# Patient Record
Sex: FEMALE | Race: WHITE | Employment: UNEMPLOYED | URBAN - METROPOLITAN AREA
[De-identification: names, ages, dates, MRNs, and addresses within clinical notes are randomized per-mention and may not be internally consistent; named-entity substitution may affect disease eponyms.]

---

## 2017-06-08 ENCOUNTER — OFFICE VISIT (OUTPATIENT)
Dept: FAMILY MEDICINE CLINIC | Age: 32
End: 2017-06-08

## 2017-06-08 VITALS
BODY MASS INDEX: 29.77 KG/M2 | HEIGHT: 63 IN | OXYGEN SATURATION: 98 % | SYSTOLIC BLOOD PRESSURE: 122 MMHG | DIASTOLIC BLOOD PRESSURE: 84 MMHG | WEIGHT: 168 LBS | HEART RATE: 74 BPM

## 2017-06-08 DIAGNOSIS — R10.13 EPIGASTRIC PAIN: ICD-10-CM

## 2017-06-08 DIAGNOSIS — R20.2 NUMBNESS AND TINGLING IN LEFT ARM: ICD-10-CM

## 2017-06-08 DIAGNOSIS — R42 DIZZINESS: ICD-10-CM

## 2017-06-08 DIAGNOSIS — R20.0 NUMBNESS AND TINGLING IN LEFT ARM: ICD-10-CM

## 2017-06-08 DIAGNOSIS — R51.9 PERSISTENT HEADACHES: ICD-10-CM

## 2017-06-08 DIAGNOSIS — N88.9 ABNORMALITY OF UTERINE CERVIX: ICD-10-CM

## 2017-06-08 DIAGNOSIS — K46.9 ABDOMINAL HERNIA WITHOUT OBSTRUCTION AND WITHOUT GANGRENE, RECURRENCE NOT SPECIFIED, UNSPECIFIED HERNIA TYPE: ICD-10-CM

## 2017-06-08 DIAGNOSIS — F90.9 ATTENTION DEFICIT HYPERACTIVITY DISORDER (ADHD), UNSPECIFIED ADHD TYPE: Primary | ICD-10-CM

## 2017-06-08 PROCEDURE — 99203 OFFICE O/P NEW LOW 30 MIN: CPT | Performed by: FAMILY MEDICINE

## 2017-06-08 ASSESSMENT — ENCOUNTER SYMPTOMS
DIARRHEA: 1
ABDOMINAL PAIN: 1
VOMITING: 0
BLOOD IN STOOL: 0
CONSTIPATION: 0
BACK PAIN: 1
NAUSEA: 0

## 2017-06-08 ASSESSMENT — PATIENT HEALTH QUESTIONNAIRE - PHQ9
SUM OF ALL RESPONSES TO PHQ9 QUESTIONS 1 & 2: 0
2. FEELING DOWN, DEPRESSED OR HOPELESS: 0
1. LITTLE INTEREST OR PLEASURE IN DOING THINGS: 0
SUM OF ALL RESPONSES TO PHQ QUESTIONS 1-9: 0

## 2017-06-09 ENCOUNTER — HOSPITAL ENCOUNTER (OUTPATIENT)
Dept: GENERAL RADIOLOGY | Age: 32
Discharge: HOME | End: 2017-06-09

## 2017-06-09 DIAGNOSIS — R20.0 NUMBNESS AND TINGLING IN LEFT ARM: ICD-10-CM

## 2017-06-09 DIAGNOSIS — R42 DIZZINESS: ICD-10-CM

## 2017-06-09 DIAGNOSIS — R20.2 NUMBNESS AND TINGLING IN LEFT ARM: ICD-10-CM

## 2017-06-09 DIAGNOSIS — R51.9 PERSISTENT HEADACHES: ICD-10-CM

## 2017-06-09 LAB
A/G RATIO: 1.5 (ref 1.1–2.2)
ALBUMIN SERPL-MCNC: 4.5 G/DL (ref 3.4–5)
ALP BLD-CCNC: 76 U/L (ref 40–129)
ALT SERPL-CCNC: 13 U/L (ref 10–40)
ANION GAP SERPL CALCULATED.3IONS-SCNC: 17 MMOL/L (ref 3–16)
AST SERPL-CCNC: 21 U/L (ref 15–37)
BASOPHILS ABSOLUTE: 0 K/UL (ref 0–0.2)
BASOPHILS RELATIVE PERCENT: 0.4 %
BILIRUB SERPL-MCNC: 1.1 MG/DL (ref 0–1)
BUN BLDV-MCNC: 14 MG/DL (ref 7–20)
CALCIUM SERPL-MCNC: 9.8 MG/DL (ref 8.3–10.6)
CHLORIDE BLD-SCNC: 102 MMOL/L (ref 99–110)
CHOLESTEROL, TOTAL: 190 MG/DL (ref 0–199)
CO2: 22 MMOL/L (ref 21–32)
CREAT SERPL-MCNC: 0.6 MG/DL (ref 0.6–1.1)
EOSINOPHILS ABSOLUTE: 0.1 K/UL (ref 0–0.6)
EOSINOPHILS RELATIVE PERCENT: 1 %
GFR AFRICAN AMERICAN: >60
GFR NON-AFRICAN AMERICAN: >60
GLOBULIN: 3.1 G/DL
GLUCOSE BLD-MCNC: 73 MG/DL (ref 70–99)
HCT VFR BLD CALC: 43.9 % (ref 36–48)
HDLC SERPL-MCNC: 37 MG/DL (ref 40–60)
HEMOGLOBIN: 14.7 G/DL (ref 12–16)
LDL CHOLESTEROL CALCULATED: 125 MG/DL
LYMPHOCYTES ABSOLUTE: 2.1 K/UL (ref 1–5.1)
LYMPHOCYTES RELATIVE PERCENT: 30.1 %
MAGNESIUM: 2.2 MG/DL (ref 1.8–2.4)
MCH RBC QN AUTO: 30 PG (ref 26–34)
MCHC RBC AUTO-ENTMCNC: 33.6 G/DL (ref 31–36)
MCV RBC AUTO: 89.2 FL (ref 80–100)
MONOCYTES ABSOLUTE: 0.4 K/UL (ref 0–1.3)
MONOCYTES RELATIVE PERCENT: 5.8 %
NEUTROPHILS ABSOLUTE: 4.3 K/UL (ref 1.7–7.7)
NEUTROPHILS RELATIVE PERCENT: 62.7 %
PDW BLD-RTO: 12.8 % (ref 12.4–15.4)
PLATELET # BLD: 257 K/UL (ref 135–450)
PMV BLD AUTO: 8.3 FL (ref 5–10.5)
POTASSIUM SERPL-SCNC: 4.4 MMOL/L (ref 3.5–5.1)
RBC # BLD: 4.92 M/UL (ref 4–5.2)
SODIUM BLD-SCNC: 141 MMOL/L (ref 136–145)
T3 FREE: 3.1 PG/ML (ref 2.3–4.2)
TOTAL PROTEIN: 7.6 G/DL (ref 6.4–8.2)
TRIGL SERPL-MCNC: 138 MG/DL (ref 0–150)
TSH SERPL DL<=0.05 MIU/L-ACNC: 1.78 UIU/ML (ref 0.27–4.2)
VLDLC SERPL CALC-MCNC: 28 MG/DL
WBC # BLD: 6.9 K/UL (ref 4–11)

## 2017-06-14 ENCOUNTER — TELEPHONE (OUTPATIENT)
Dept: FAMILY MEDICINE CLINIC | Age: 32
End: 2017-06-14

## 2017-09-13 ENCOUNTER — OFFICE VISIT (OUTPATIENT)
Dept: FAMILY MEDICINE CLINIC | Age: 32
End: 2017-09-13

## 2017-09-13 VITALS
SYSTOLIC BLOOD PRESSURE: 126 MMHG | OXYGEN SATURATION: 96 % | HEART RATE: 74 BPM | HEIGHT: 63 IN | TEMPERATURE: 97.8 F | BODY MASS INDEX: 30.12 KG/M2 | RESPIRATION RATE: 12 BRPM | WEIGHT: 170 LBS | DIASTOLIC BLOOD PRESSURE: 88 MMHG

## 2017-09-13 DIAGNOSIS — N39.0 URINARY TRACT INFECTION WITH HEMATURIA, SITE UNSPECIFIED: Primary | ICD-10-CM

## 2017-09-13 DIAGNOSIS — R31.9 URINARY TRACT INFECTION WITH HEMATURIA, SITE UNSPECIFIED: Primary | ICD-10-CM

## 2017-09-13 LAB
BILIRUBIN, POC: NORMAL
BLOOD URINE, POC: NORMAL
CLARITY, POC: NORMAL
COLOR, POC: NORMAL
GLUCOSE URINE, POC: NORMAL
KETONES, POC: NORMAL
LEUKOCYTE EST, POC: NORMAL
NITRITE, POC: NORMAL
PH, POC: 6.5
PROTEIN, POC: NORMAL
SPECIFIC GRAVITY, POC: NORMAL
UROBILINOGEN, POC: 2

## 2017-09-13 PROCEDURE — 81002 URINALYSIS NONAUTO W/O SCOPE: CPT | Performed by: NURSE PRACTITIONER

## 2017-09-13 PROCEDURE — 99213 OFFICE O/P EST LOW 20 MIN: CPT | Performed by: NURSE PRACTITIONER

## 2017-09-13 RX ORDER — CIPROFLOXACIN 500 MG/1
500 TABLET, FILM COATED ORAL 2 TIMES DAILY
Qty: 20 TABLET | Refills: 0 | Status: CANCELLED | OUTPATIENT
Start: 2017-09-13 | End: 2017-09-23

## 2017-09-13 RX ORDER — PHENAZOPYRIDINE HYDROCHLORIDE 100 MG/1
100 TABLET, FILM COATED ORAL 3 TIMES DAILY PRN
Qty: 9 TABLET | Refills: 0 | Status: SHIPPED | OUTPATIENT
Start: 2017-09-13 | End: 2017-09-16

## 2017-09-13 RX ORDER — CIPROFLOXACIN 500 MG/1
500 TABLET, FILM COATED ORAL 2 TIMES DAILY
Qty: 14 TABLET | Refills: 0 | Status: SHIPPED | OUTPATIENT
Start: 2017-09-13 | End: 2017-09-20

## 2017-09-13 ASSESSMENT — ENCOUNTER SYMPTOMS
NAUSEA: 0
VOMITING: 0

## 2017-09-17 LAB
ORGANISM: ABNORMAL
URINE CULTURE, ROUTINE: ABNORMAL
URINE CULTURE, ROUTINE: ABNORMAL

## 2018-01-20 ENCOUNTER — TELEPHONE (OUTPATIENT)
Dept: FAMILY MEDICINE CLINIC | Age: 33
End: 2018-01-20

## 2018-01-20 DIAGNOSIS — R39.9 UTI SYMPTOMS: Primary | ICD-10-CM

## 2018-01-20 RX ORDER — NITROFURANTOIN 25; 75 MG/1; MG/1
100 CAPSULE ORAL 2 TIMES DAILY
Qty: 14 CAPSULE | Refills: 0 | Status: SHIPPED | OUTPATIENT
Start: 2018-01-20 | End: 2018-01-27

## 2018-01-20 NOTE — TELEPHONE ENCOUNTER
On Call Note: 01/20/19 @ 13:59   Pt's mother (Butch Brock) called stating that pt was having increased urinary frequency with blood and pain with urinating starting this morning, also admits to abdominal pain, denies f/c or flank pain. Prescribed Macribid 100 mg BID x 7 days and instructed her that pt should drink plenty of water and call office on Monday with status. Also, if pt's symptoms worsen tomorrow then she should be seen at Urgent Care.

## 2018-11-28 ENCOUNTER — HOSPITAL ENCOUNTER (EMERGENCY)
Age: 33
Discharge: HOME | End: 2018-11-28
Attending: EMERGENCY MEDICINE

## 2018-11-28 VITALS
WEIGHT: 144 LBS | TEMPERATURE: 96.5 F | RESPIRATION RATE: 16 BRPM | OXYGEN SATURATION: 100 % | HEART RATE: 77 BPM | HEIGHT: 62 IN | SYSTOLIC BLOOD PRESSURE: 121 MMHG | DIASTOLIC BLOOD PRESSURE: 89 MMHG | BODY MASS INDEX: 26.5 KG/M2

## 2018-11-28 DIAGNOSIS — R10.13 ABDOMINAL PAIN, EPIGASTRIC: Primary | ICD-10-CM

## 2018-11-28 LAB
A/G RATIO: 1.7 (ref 1.1–2.2)
ALBUMIN SERPL-MCNC: 4.5 G/DL (ref 3.4–5)
ALP BLD-CCNC: 88 U/L (ref 40–129)
ALT SERPL-CCNC: 16 U/L (ref 10–40)
ANION GAP SERPL CALCULATED.3IONS-SCNC: 7 MMOL/L (ref 3–16)
AST SERPL-CCNC: 18 U/L (ref 15–37)
BASOPHILS ABSOLUTE: 0 K/UL (ref 0–0.2)
BASOPHILS RELATIVE PERCENT: 0.5 %
BILIRUB SERPL-MCNC: 0.7 MG/DL (ref 0–1)
BILIRUBIN URINE: NEGATIVE
BLOOD, URINE: NEGATIVE
BUN BLDV-MCNC: 11 MG/DL (ref 7–20)
CALCIUM SERPL-MCNC: 9.8 MG/DL (ref 8.3–10.6)
CHLORIDE BLD-SCNC: 105 MMOL/L (ref 99–110)
CLARITY: CLEAR
CO2: 24 MMOL/L (ref 21–32)
COLOR: YELLOW
CREAT SERPL-MCNC: 0.7 MG/DL (ref 0.6–1.1)
EOSINOPHILS ABSOLUTE: 0.1 K/UL (ref 0–0.6)
EOSINOPHILS RELATIVE PERCENT: 0.9 %
GFR AFRICAN AMERICAN: >60
GFR NON-AFRICAN AMERICAN: >60
GLOBULIN: 2.7 G/DL
GLUCOSE BLD-MCNC: 93 MG/DL (ref 70–99)
GLUCOSE URINE: NEGATIVE MG/DL
HCG(URINE) PREGNANCY TEST: NEGATIVE
HCT VFR BLD CALC: 40.4 % (ref 36–48)
HEMOGLOBIN: 14.1 G/DL (ref 12–16)
KETONES, URINE: ABNORMAL MG/DL
LEUKOCYTE ESTERASE, URINE: NEGATIVE
LIPASE: 22 U/L (ref 13–60)
LYMPHOCYTES ABSOLUTE: 1.9 K/UL (ref 1–5.1)
LYMPHOCYTES RELATIVE PERCENT: 29.8 %
MCH RBC QN AUTO: 30.7 PG (ref 26–34)
MCHC RBC AUTO-ENTMCNC: 35 G/DL (ref 31–36)
MCV RBC AUTO: 87.7 FL (ref 80–100)
MICROSCOPIC EXAMINATION: ABNORMAL
MONOCYTES ABSOLUTE: 0.4 K/UL (ref 0–1.3)
MONOCYTES RELATIVE PERCENT: 6.5 %
NEUTROPHILS ABSOLUTE: 4 K/UL (ref 1.7–7.7)
NEUTROPHILS RELATIVE PERCENT: 62.3 %
NITRITE, URINE: NEGATIVE
PDW BLD-RTO: 12.7 % (ref 12.4–15.4)
PH UA: 6.5
PLATELET # BLD: 275 K/UL (ref 135–450)
PMV BLD AUTO: 7 FL (ref 5–10.5)
POTASSIUM SERPL-SCNC: 3.7 MMOL/L (ref 3.5–5.1)
PROTEIN UA: NEGATIVE MG/DL
RBC # BLD: 4.6 M/UL (ref 4–5.2)
SODIUM BLD-SCNC: 136 MMOL/L (ref 136–145)
SPECIFIC GRAVITY UA: 1.02
TOTAL PROTEIN: 7.2 G/DL (ref 6.4–8.2)
URINE REFLEX TO CULTURE: ABNORMAL
URINE TYPE: ABNORMAL
UROBILINOGEN, URINE: 1 E.U./DL
WBC # BLD: 6.4 K/UL (ref 4–11)

## 2018-11-28 PROCEDURE — 85025 COMPLETE CBC W/AUTO DIFF WBC: CPT

## 2018-11-28 PROCEDURE — 81003 URINALYSIS AUTO W/O SCOPE: CPT

## 2018-11-28 PROCEDURE — 96374 THER/PROPH/DIAG INJ IV PUSH: CPT

## 2018-11-28 PROCEDURE — 83690 ASSAY OF LIPASE: CPT

## 2018-11-28 PROCEDURE — 6360000002 HC RX W HCPCS: Performed by: EMERGENCY MEDICINE

## 2018-11-28 PROCEDURE — 84703 CHORIONIC GONADOTROPIN ASSAY: CPT

## 2018-11-28 PROCEDURE — 96375 TX/PRO/DX INJ NEW DRUG ADDON: CPT

## 2018-11-28 PROCEDURE — 80053 COMPREHEN METABOLIC PANEL: CPT

## 2018-11-28 PROCEDURE — 99283 EMERGENCY DEPT VISIT LOW MDM: CPT

## 2018-11-28 RX ORDER — PANTOPRAZOLE SODIUM 20 MG/1
20 TABLET, DELAYED RELEASE ORAL
Qty: 30 TABLET | Refills: 0 | Status: SHIPPED | OUTPATIENT
Start: 2018-11-28 | End: 2019-07-25 | Stop reason: ALTCHOICE

## 2018-11-28 RX ORDER — DICYCLOMINE HYDROCHLORIDE 10 MG/1
10 CAPSULE ORAL
Qty: 20 CAPSULE | Refills: 0 | Status: SHIPPED | OUTPATIENT
Start: 2018-11-28 | End: 2019-07-25 | Stop reason: ALTCHOICE

## 2018-11-28 RX ORDER — ONDANSETRON 2 MG/ML
4 INJECTION INTRAMUSCULAR; INTRAVENOUS EVERY 30 MIN PRN
Status: DISCONTINUED | OUTPATIENT
Start: 2018-11-28 | End: 2018-11-28 | Stop reason: HOSPADM

## 2018-11-28 RX ORDER — METOCLOPRAMIDE HYDROCHLORIDE 5 MG/ML
5 INJECTION INTRAMUSCULAR; INTRAVENOUS ONCE
Status: COMPLETED | OUTPATIENT
Start: 2018-11-28 | End: 2018-11-28

## 2018-11-28 RX ADMIN — METOCLOPRAMIDE 5 MG: 5 INJECTION, SOLUTION INTRAMUSCULAR; INTRAVENOUS at 11:20

## 2018-11-28 RX ADMIN — ONDANSETRON 4 MG: 2 INJECTION INTRAMUSCULAR; INTRAVENOUS at 11:20

## 2018-11-28 ASSESSMENT — PAIN DESCRIPTION - PAIN TYPE: TYPE: ACUTE PAIN

## 2018-11-28 ASSESSMENT — PAIN SCALES - GENERAL: PAINLEVEL_OUTOF10: 8

## 2018-11-28 ASSESSMENT — PAIN DESCRIPTION - LOCATION: LOCATION: ABDOMEN

## 2018-11-28 NOTE — ED TRIAGE NOTES
Chief Complaint   Patient presents with    Abdominal Pain     Pt presents to Ed with c/o RUQ abdominal pain.  +bloating.  +nasuea. Denies vomiting. Denies diarrhea/constipation. Denies dysuria.

## 2018-11-28 NOTE — ED PROVIDER NOTES
Emergency Department [facility] ED    Patient: Erlin Bunn  MRN: [id number]  : 1985  Date of Evaluation: 2018  ED Provider: Meche Gramajo MD    Chief Complaint       Chief Complaint   Patient presents with    Abdominal Pain     Pt presents to Ed with c/o RUQ abdominal pain.  +bloating.  +nasuea. Denies vomiting. Denies diarrhea/constipation. Denies dysuria. Elana Zuniga is a 35 y.o. female who presents to the emergency department Complaining of a one-week history this started up with her feeling bloated in her abdomen and now in her mid to upper abdomen, pressure sensation. It radiates to her back at times not too bad. She's had no initiated fever chills rigors. She denies melena hematochezia or diarrhea but has a slight amount of nausea. She's had a cholecystectomy in the past and tubal ligation. Denies any associated belching burping but has had a history of some reflux in the distant past for which she is no longer taking medication. ROS:     At least 10 systems reviewed and otherwise acutely negative except as in the [address]     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      Past Surgical History:   Procedure Laterality Date    CHOLECYSTECTOMY      TUBAL LIGATION       Social History     Social History    Marital status:      Spouse name: N/A    Number of children: N/A    Years of education: N/A     Social History Main Topics    Smoking status: Never Smoker    Smokeless tobacco: Never Used    Alcohol use No    Drug use: No    Sexual activity: Not Currently     Other Topics Concern    None     Social History Narrative    None       Medications/Allergies     Previous Medications    No medications on file     Allergies   Allergen Reactions    Dye [Iodides] Other (See Comments)     Low HR    Sulfa Antibiotics 11/28/18 at 1120 [facility]    11/28/18 1108 11/28/18 1108  ondansetron (ZOFRAN) injection 4 mg  EVERY 30 MIN PRN      Last MAR action:  Given - by [facility] Staff on 11/28/18 at 1120 [address] [facility]          Final Impression      1.  Abdominal pain, epigastric      DISPOSITION Decision To Discharge 11/28/2018 12:27:37 PM     (Please note that portions of this note may have been completed with a voice recognition program. Efforts were made to edit the dictations but occasionally words are mis-transcribed.)    Anirudh Coffman MD  [id number]en MD  11/28/18 3254

## 2019-02-13 ENCOUNTER — NURSE ONLY (OUTPATIENT)
Dept: FAMILY MEDICINE CLINIC | Age: 34
End: 2019-02-13
Payer: COMMERCIAL

## 2019-02-13 DIAGNOSIS — Z23 NEED FOR TDAP VACCINATION: Primary | ICD-10-CM

## 2019-02-13 DIAGNOSIS — Z23 NEED FOR MMR VACCINE: ICD-10-CM

## 2019-02-13 PROCEDURE — 90472 IMMUNIZATION ADMIN EACH ADD: CPT | Performed by: NURSE PRACTITIONER

## 2019-02-13 PROCEDURE — 90715 TDAP VACCINE 7 YRS/> IM: CPT | Performed by: NURSE PRACTITIONER

## 2019-02-13 PROCEDURE — 90707 MMR VACCINE SC: CPT | Performed by: NURSE PRACTITIONER

## 2019-02-13 PROCEDURE — 90471 IMMUNIZATION ADMIN: CPT | Performed by: NURSE PRACTITIONER

## 2019-07-25 ENCOUNTER — OFFICE VISIT (OUTPATIENT)
Dept: FAMILY MEDICINE CLINIC | Age: 34
End: 2019-07-25
Payer: COMMERCIAL

## 2019-07-25 VITALS
HEIGHT: 63 IN | OXYGEN SATURATION: 98 % | BODY MASS INDEX: 25.34 KG/M2 | WEIGHT: 143 LBS | HEART RATE: 103 BPM | SYSTOLIC BLOOD PRESSURE: 112 MMHG | DIASTOLIC BLOOD PRESSURE: 80 MMHG

## 2019-07-25 DIAGNOSIS — R06.02 SHORTNESS OF BREATH: ICD-10-CM

## 2019-07-25 DIAGNOSIS — R07.9 CHEST PAIN, UNSPECIFIED TYPE: Primary | ICD-10-CM

## 2019-07-25 PROBLEM — J30.9 ALLERGIC RHINITIS: Status: ACTIVE | Noted: 2019-07-25

## 2019-07-25 PROBLEM — J45.909 EXTRINSIC ASTHMA: Status: ACTIVE | Noted: 2019-07-25

## 2019-07-25 PROCEDURE — 1036F TOBACCO NON-USER: CPT | Performed by: FAMILY MEDICINE

## 2019-07-25 PROCEDURE — G8427 DOCREV CUR MEDS BY ELIG CLIN: HCPCS | Performed by: FAMILY MEDICINE

## 2019-07-25 PROCEDURE — 99213 OFFICE O/P EST LOW 20 MIN: CPT | Performed by: FAMILY MEDICINE

## 2019-07-25 PROCEDURE — 93000 ELECTROCARDIOGRAM COMPLETE: CPT | Performed by: FAMILY MEDICINE

## 2019-07-25 PROCEDURE — G8419 CALC BMI OUT NRM PARAM NOF/U: HCPCS | Performed by: FAMILY MEDICINE

## 2019-07-25 ASSESSMENT — PATIENT HEALTH QUESTIONNAIRE - PHQ9
1. LITTLE INTEREST OR PLEASURE IN DOING THINGS: 1
SUM OF ALL RESPONSES TO PHQ QUESTIONS 1-9: 2
SUM OF ALL RESPONSES TO PHQ9 QUESTIONS 1 & 2: 2
SUM OF ALL RESPONSES TO PHQ QUESTIONS 1-9: 2
2. FEELING DOWN, DEPRESSED OR HOPELESS: 1

## 2019-07-25 ASSESSMENT — ENCOUNTER SYMPTOMS: SHORTNESS OF BREATH: 1

## 2019-07-25 NOTE — PROGRESS NOTES
reviewed. Plan   Diagnosis Orders   1. Chest pain, unspecified type  Thang Pabon MD, Cardiology, [facility]    EKG 12 Lead   2. Shortness of breath  Johny Shi MD, Pulmonary, [facility]    EKG 12 Lead     EKG today NSR,wnl  Counseled pt to seek immediate ED care for CP that is worsening. Return in about 1 month (around 8/25/2019) for Cardiology/Pulmonology F/U.     Prior to Visit Medications    Not on File

## 2019-08-07 ENCOUNTER — OFFICE VISIT (OUTPATIENT)
Dept: CARDIOLOGY CLINIC | Age: 34
End: 2019-08-07
Payer: COMMERCIAL

## 2019-08-07 VITALS
DIASTOLIC BLOOD PRESSURE: 90 MMHG | HEART RATE: 64 BPM | WEIGHT: 146 LBS | SYSTOLIC BLOOD PRESSURE: 108 MMHG | HEIGHT: 63 IN | OXYGEN SATURATION: 99 % | BODY MASS INDEX: 25.87 KG/M2

## 2019-08-07 DIAGNOSIS — R07.89 OTHER CHEST PAIN: Primary | ICD-10-CM

## 2019-08-07 DIAGNOSIS — R06.02 SOB (SHORTNESS OF BREATH): ICD-10-CM

## 2019-08-07 DIAGNOSIS — R00.2 PALPITATIONS: ICD-10-CM

## 2019-08-07 PROCEDURE — 99204 OFFICE O/P NEW MOD 45 MIN: CPT | Performed by: INTERNAL MEDICINE

## 2019-08-07 PROCEDURE — G8427 DOCREV CUR MEDS BY ELIG CLIN: HCPCS | Performed by: INTERNAL MEDICINE

## 2019-08-07 PROCEDURE — 1036F TOBACCO NON-USER: CPT | Performed by: INTERNAL MEDICINE

## 2019-08-07 PROCEDURE — G8419 CALC BMI OUT NRM PARAM NOF/U: HCPCS | Performed by: INTERNAL MEDICINE

## 2019-08-07 NOTE — LETTER
[address] [facility]   Cardiovascular Evaluation    PATIENT: Judy Canas  DATE: 2019  MRN: [id number]  CSN: [id number]  : 1985      Primary Care Doctor: Brigitte Levi DO  Reason for evaluation:   New Patient; Chest Pain; and Shortness of Breath      Subjective:   History of present illness on initial date of evaluation:   Judy Canas is a 29 y.o. patient who presents referred by Dr Papi Chavez for chest pain. She states she has been having chest pain x 2 months. States gradually getting worse. She states SOB is occurring with exertion. She states positioning does not matter. She states that when she exerts herself she has to stop and rest.  She states she feels her heart pounding. She does not wake up gasping for air. Does not notice any edema. States has tingling in her left arm. 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 She denies dizziness or syncope. She does not smoke. Patient Active Problem List   Diagnosis    Urinary tract infection with hematuria    Allergic rhinitis    Congenital bronchial atresia    Extrinsic asthma    Postoperative abdominal pain    Other chest pain    SOB (shortness of breath)    Palpitations         Past Medical History:   has a past medical history of ADHD (attention deficit hyperactivity disorder), Allergic rhinitis, Asthma, and Premature baby. Surgical History:   has a past surgical history that includes Cholecystectomy and Tubal ligation. Social History:   reports that she has never smoked. She has never used smokeless tobacco. She reports that she does not drink alcohol or use drugs. Family History:  Grandmother  - heart disease      Home Medications:  Reviewed and are listed in nursing record. and/or listed below  No current outpatient medications on file. No current facility-administered medications for this visit.          Allergies:

## 2019-08-12 ENCOUNTER — TELEPHONE (OUTPATIENT)
Dept: CARDIOLOGY CLINIC | Age: 34
End: 2019-08-12

## 2019-08-12 ENCOUNTER — HOSPITAL ENCOUNTER (OUTPATIENT)
Age: 34
Discharge: HOME | End: 2019-08-12
Payer: COMMERCIAL

## 2019-08-12 DIAGNOSIS — R00.2 PALPITATIONS: ICD-10-CM

## 2019-08-12 DIAGNOSIS — R07.89 OTHER CHEST PAIN: ICD-10-CM

## 2019-08-12 DIAGNOSIS — R06.02 SOB (SHORTNESS OF BREATH): ICD-10-CM

## 2019-08-12 LAB
A/G RATIO: 1.4 (ref 1.1–2.2)
ALBUMIN SERPL-MCNC: 4.4 G/DL (ref 3.4–5)
ALP BLD-CCNC: 76 U/L (ref 40–129)
ALT SERPL-CCNC: 11 U/L (ref 10–40)
ANION GAP SERPL CALCULATED.3IONS-SCNC: 13 MMOL/L (ref 3–16)
AST SERPL-CCNC: 19 U/L (ref 15–37)
BASOPHILS ABSOLUTE: 0.1 K/UL (ref 0–0.2)
BASOPHILS RELATIVE PERCENT: 0.8 %
BILIRUB SERPL-MCNC: 0.8 MG/DL (ref 0–1)
BUN BLDV-MCNC: 13 MG/DL (ref 7–20)
C-REACTIVE PROTEIN: <0.3 MG/L (ref 0–5.1)
CALCIUM SERPL-MCNC: 10.5 MG/DL (ref 8.3–10.6)
CHLORIDE BLD-SCNC: 104 MMOL/L (ref 99–110)
CHOLESTEROL, FASTING: 153 MG/DL (ref 0–199)
CO2: 22 MMOL/L (ref 21–32)
CREAT SERPL-MCNC: 0.7 MG/DL (ref 0.6–1.1)
EOSINOPHILS ABSOLUTE: 0.1 K/UL (ref 0–0.6)
EOSINOPHILS RELATIVE PERCENT: 1 %
GFR AFRICAN AMERICAN: >60
GFR NON-AFRICAN AMERICAN: >60
GLOBULIN: 3.1 G/DL
GLUCOSE BLD-MCNC: 85 MG/DL (ref 70–99)
HCT VFR BLD CALC: 44.2 % (ref 36–48)
HDLC SERPL-MCNC: 45 MG/DL (ref 40–60)
HEMOGLOBIN: 14.8 G/DL (ref 12–16)
LDL CHOLESTEROL CALCULATED: 90 MG/DL
LYMPHOCYTES ABSOLUTE: 2.4 K/UL (ref 1–5.1)
LYMPHOCYTES RELATIVE PERCENT: 32.3 %
MCH RBC QN AUTO: 30.3 PG (ref 26–34)
MCHC RBC AUTO-ENTMCNC: 33.6 G/DL (ref 31–36)
MCV RBC AUTO: 90.4 FL (ref 80–100)
MONOCYTES ABSOLUTE: 0.4 K/UL (ref 0–1.3)
MONOCYTES RELATIVE PERCENT: 5.4 %
NEUTROPHILS ABSOLUTE: 4.4 K/UL (ref 1.7–7.7)
NEUTROPHILS RELATIVE PERCENT: 60.5 %
PDW BLD-RTO: 13.3 % (ref 12.4–15.4)
PLATELET # BLD: 305 K/UL (ref 135–450)
PMV BLD AUTO: 8.3 FL (ref 5–10.5)
POTASSIUM SERPL-SCNC: 5.2 MMOL/L (ref 3.5–5.1)
RBC # BLD: 4.89 M/UL (ref 4–5.2)
SEDIMENTATION RATE, ERYTHROCYTE: 6 MM/HR (ref 0–20)
SODIUM BLD-SCNC: 139 MMOL/L (ref 136–145)
TOTAL PROTEIN: 7.5 G/DL (ref 6.4–8.2)
TRIGLYCERIDE, FASTING: 89 MG/DL (ref 0–150)
TSH REFLEX FT4: 1.53 UIU/ML (ref 0.27–4.2)
VLDLC SERPL CALC-MCNC: 18 MG/DL
WBC # BLD: 7.3 K/UL (ref 4–11)

## 2019-08-12 PROCEDURE — 80061 LIPID PANEL: CPT

## 2019-08-12 PROCEDURE — 86140 C-REACTIVE PROTEIN: CPT

## 2019-08-12 PROCEDURE — 36415 COLL VENOUS BLD VENIPUNCTURE: CPT

## 2019-08-12 PROCEDURE — 85652 RBC SED RATE AUTOMATED: CPT

## 2019-08-12 PROCEDURE — 80053 COMPREHEN METABOLIC PANEL: CPT

## 2019-08-12 PROCEDURE — 84443 ASSAY THYROID STIM HORMONE: CPT

## 2019-08-12 PROCEDURE — 85025 COMPLETE CBC W/AUTO DIFF WBC: CPT

## 2019-08-12 NOTE — TELEPHONE ENCOUNTER
8-12-19 LM at [phone] informing pt to call back to schedule and echo per JJP's most recent ov notes. Informed in message that I will be in and out of the office this week and if does not call back today, to then reach out to Central scheduling. Both numbers given in vm.

## 2019-08-15 ENCOUNTER — TELEPHONE (OUTPATIENT)
Dept: CARDIOLOGY CLINIC | Age: 34
End: 2019-08-15

## 2019-08-30 ENCOUNTER — HOSPITAL ENCOUNTER (OUTPATIENT)
Dept: CARDIOLOGY | Age: 34
Discharge: HOME | End: 2019-08-30
Payer: COMMERCIAL

## 2019-08-30 DIAGNOSIS — R06.02 SOB (SHORTNESS OF BREATH): ICD-10-CM

## 2019-08-30 DIAGNOSIS — R07.89 OTHER CHEST PAIN: ICD-10-CM

## 2019-08-30 LAB
LV EF: 55 %
LVEF MODALITY: NORMAL

## 2019-08-30 PROCEDURE — 93306 TTE W/DOPPLER COMPLETE: CPT

## 2019-09-30 ENCOUNTER — HOSPITAL ENCOUNTER (EMERGENCY)
Age: 34
Discharge: LEFT BEFORE DISCHARGE | End: 2019-09-30
Payer: COMMERCIAL

## 2019-10-01 ENCOUNTER — OFFICE VISIT (OUTPATIENT)
Dept: FAMILY MEDICINE CLINIC | Age: 34
End: 2019-10-01
Payer: COMMERCIAL

## 2019-10-01 VITALS
SYSTOLIC BLOOD PRESSURE: 96 MMHG | HEART RATE: 87 BPM | BODY MASS INDEX: 26.58 KG/M2 | WEIGHT: 150 LBS | OXYGEN SATURATION: 98 % | DIASTOLIC BLOOD PRESSURE: 76 MMHG | HEIGHT: 63 IN

## 2019-10-01 DIAGNOSIS — R10.9 FLANK PAIN: Primary | ICD-10-CM

## 2019-10-01 DIAGNOSIS — R10.9 FLANK PAIN: ICD-10-CM

## 2019-10-01 DIAGNOSIS — N39.0 URINARY TRACT INFECTION WITHOUT HEMATURIA, SITE UNSPECIFIED: ICD-10-CM

## 2019-10-01 LAB
BILIRUBIN, POC: NORMAL
BLOOD URINE, POC: NORMAL
CLARITY, POC: NORMAL
COLOR, POC: NORMAL
GLUCOSE URINE, POC: NORMAL
HCT VFR BLD CALC: 41.4 % (ref 36–48)
HEMOGLOBIN: 14.2 G/DL (ref 12–16)
KETONES, POC: NORMAL
LEUKOCYTE EST, POC: NORMAL
MCH RBC QN AUTO: 31 PG (ref 26–34)
MCHC RBC AUTO-ENTMCNC: 34.3 G/DL (ref 31–36)
MCV RBC AUTO: 90.2 FL (ref 80–100)
NITRITE, POC: NORMAL
PDW BLD-RTO: 13.1 % (ref 12.4–15.4)
PH, POC: 6.5
PLATELET # BLD: 299 K/UL (ref 135–450)
PMV BLD AUTO: 7.7 FL (ref 5–10.5)
PROTEIN, POC: 30
RBC # BLD: 4.59 M/UL (ref 4–5.2)
SPECIFIC GRAVITY, POC: 1.02
UROBILINOGEN, POC: 2
WBC # BLD: 7.4 K/UL (ref 4–11)

## 2019-10-01 PROCEDURE — 99213 OFFICE O/P EST LOW 20 MIN: CPT | Performed by: NURSE PRACTITIONER

## 2019-10-01 PROCEDURE — G8419 CALC BMI OUT NRM PARAM NOF/U: HCPCS | Performed by: NURSE PRACTITIONER

## 2019-10-01 PROCEDURE — G8484 FLU IMMUNIZE NO ADMIN: HCPCS | Performed by: NURSE PRACTITIONER

## 2019-10-01 PROCEDURE — G8427 DOCREV CUR MEDS BY ELIG CLIN: HCPCS | Performed by: NURSE PRACTITIONER

## 2019-10-01 PROCEDURE — 81002 URINALYSIS NONAUTO W/O SCOPE: CPT | Performed by: NURSE PRACTITIONER

## 2019-10-01 PROCEDURE — 1036F TOBACCO NON-USER: CPT | Performed by: NURSE PRACTITIONER

## 2019-10-01 RX ORDER — DICLOFENAC SODIUM 75 MG/1
TABLET, DELAYED RELEASE ORAL
Refills: 0 | COMMUNITY
Start: 2019-08-17 | End: 2020-01-28

## 2019-10-01 RX ORDER — CIPROFLOXACIN 500 MG/1
500 TABLET, FILM COATED ORAL 2 TIMES DAILY
Qty: 14 TABLET | Refills: 0 | Status: SHIPPED | OUTPATIENT
Start: 2019-10-01 | End: 2019-10-08 | Stop reason: ALTCHOICE

## 2019-10-01 ASSESSMENT — ENCOUNTER SYMPTOMS
WHEEZING: 0
ABDOMINAL PAIN: 1
COUGH: 0
ABDOMINAL DISTENTION: 0
BACK PAIN: 1
ANAL BLEEDING: 0
STRIDOR: 0
BLOOD IN STOOL: 0
NAUSEA: 1
CONSTIPATION: 0
VOMITING: 0
SHORTNESS OF BREATH: 0
RECTAL PAIN: 0
DIARRHEA: 0

## 2019-10-02 LAB
ALBUMIN SERPL-MCNC: 4.8 G/DL (ref 3.4–5)
ANION GAP SERPL CALCULATED.3IONS-SCNC: 15 MMOL/L (ref 3–16)
BUN BLDV-MCNC: 19 MG/DL (ref 7–20)
CALCIUM SERPL-MCNC: 10.3 MG/DL (ref 8.3–10.6)
CHLORIDE BLD-SCNC: 106 MMOL/L (ref 99–110)
CO2: 23 MMOL/L (ref 21–32)
CREAT SERPL-MCNC: 0.8 MG/DL (ref 0.6–1.1)
GFR AFRICAN AMERICAN: >60
GFR NON-AFRICAN AMERICAN: >60
GLUCOSE BLD-MCNC: 81 MG/DL (ref 70–99)
PHOSPHORUS: 2.7 MG/DL (ref 2.5–4.9)
POTASSIUM SERPL-SCNC: 4.2 MMOL/L (ref 3.5–5.1)
SODIUM BLD-SCNC: 144 MMOL/L (ref 136–145)

## 2019-10-03 ENCOUNTER — TELEPHONE (OUTPATIENT)
Dept: FAMILY MEDICINE CLINIC | Age: 34
End: 2019-10-03

## 2019-10-03 LAB — URINE CULTURE, ROUTINE: NORMAL

## 2019-10-08 ENCOUNTER — OFFICE VISIT (OUTPATIENT)
Dept: FAMILY MEDICINE CLINIC | Age: 34
End: 2019-10-08
Payer: COMMERCIAL

## 2019-10-08 VITALS
RESPIRATION RATE: 14 BRPM | HEART RATE: 76 BPM | HEIGHT: 63 IN | DIASTOLIC BLOOD PRESSURE: 76 MMHG | BODY MASS INDEX: 25.69 KG/M2 | TEMPERATURE: 98.2 F | OXYGEN SATURATION: 96 % | SYSTOLIC BLOOD PRESSURE: 124 MMHG | WEIGHT: 145 LBS

## 2019-10-08 DIAGNOSIS — Z11.51 SCREENING FOR HPV (HUMAN PAPILLOMAVIRUS): ICD-10-CM

## 2019-10-08 DIAGNOSIS — Z00.00 ANNUAL PHYSICAL EXAM: Primary | ICD-10-CM

## 2019-10-08 DIAGNOSIS — M54.2 ACUTE NECK PAIN: ICD-10-CM

## 2019-10-08 PROCEDURE — G8484 FLU IMMUNIZE NO ADMIN: HCPCS | Performed by: FAMILY MEDICINE

## 2019-10-08 PROCEDURE — 99395 PREV VISIT EST AGE 18-39: CPT | Performed by: FAMILY MEDICINE

## 2019-10-08 RX ORDER — HYDROXYZINE HYDROCHLORIDE 25 MG/1
25 TABLET, FILM COATED ORAL
COMMUNITY
Start: 2019-09-15 | End: 2019-10-08

## 2019-10-08 RX ORDER — ONDANSETRON 4 MG/1
TABLET, ORALLY DISINTEGRATING ORAL
Refills: 0 | COMMUNITY
Start: 2019-09-16 | End: 2020-01-28

## 2019-10-08 RX ORDER — SUCRALFATE 1 G/1
1 TABLET ORAL
COMMUNITY
Start: 2019-09-15 | End: 2020-01-28

## 2019-10-08 RX ORDER — TAMSULOSIN HYDROCHLORIDE 0.4 MG/1
CAPSULE ORAL
Refills: 0 | COMMUNITY
Start: 2019-10-01 | End: 2019-10-08

## 2019-10-08 RX ORDER — TIZANIDINE 4 MG/1
4 TABLET ORAL EVERY 8 HOURS PRN
Qty: 42 TABLET | Refills: 0 | Status: SHIPPED | OUTPATIENT
Start: 2019-10-08 | End: 2019-10-22

## 2019-10-08 ASSESSMENT — ENCOUNTER SYMPTOMS
NAUSEA: 1
SHORTNESS OF BREATH: 0
ABDOMINAL PAIN: 1
EYE ITCHING: 0
EYE PAIN: 1

## 2019-10-08 ASSESSMENT — PATIENT HEALTH QUESTIONNAIRE - PHQ9
1. LITTLE INTEREST OR PLEASURE IN DOING THINGS: 0
SUM OF ALL RESPONSES TO PHQ QUESTIONS 1-9: 0
SUM OF ALL RESPONSES TO PHQ9 QUESTIONS 1 & 2: 0
2. FEELING DOWN, DEPRESSED OR HOPELESS: 0
SUM OF ALL RESPONSES TO PHQ QUESTIONS 1-9: 0

## 2019-10-11 ENCOUNTER — TELEPHONE (OUTPATIENT)
Dept: PULMONOLOGY | Age: 34
End: 2019-10-11

## 2020-01-28 ENCOUNTER — OFFICE VISIT (OUTPATIENT)
Dept: FAMILY MEDICINE CLINIC | Age: 35
End: 2020-01-28
Payer: COMMERCIAL

## 2020-01-28 VITALS
HEIGHT: 62 IN | TEMPERATURE: 98.3 F | BODY MASS INDEX: 27.79 KG/M2 | OXYGEN SATURATION: 100 % | HEART RATE: 77 BPM | RESPIRATION RATE: 16 BRPM | WEIGHT: 151 LBS | DIASTOLIC BLOOD PRESSURE: 78 MMHG | SYSTOLIC BLOOD PRESSURE: 114 MMHG

## 2020-01-28 PROCEDURE — G8431 POS CLIN DEPRES SCRN F/U DOC: HCPCS | Performed by: FAMILY MEDICINE

## 2020-01-28 PROCEDURE — 99214 OFFICE O/P EST MOD 30 MIN: CPT | Performed by: FAMILY MEDICINE

## 2020-01-28 PROCEDURE — G8419 CALC BMI OUT NRM PARAM NOF/U: HCPCS | Performed by: FAMILY MEDICINE

## 2020-01-28 PROCEDURE — 1036F TOBACCO NON-USER: CPT | Performed by: FAMILY MEDICINE

## 2020-01-28 PROCEDURE — G8427 DOCREV CUR MEDS BY ELIG CLIN: HCPCS | Performed by: FAMILY MEDICINE

## 2020-01-28 PROCEDURE — G8484 FLU IMMUNIZE NO ADMIN: HCPCS | Performed by: FAMILY MEDICINE

## 2020-01-28 ASSESSMENT — PATIENT HEALTH QUESTIONNAIRE - PHQ9
2. FEELING DOWN, DEPRESSED OR HOPELESS: 1
SUM OF ALL RESPONSES TO PHQ9 QUESTIONS 1 & 2: 4
1. LITTLE INTEREST OR PLEASURE IN DOING THINGS: 3
10. IF YOU CHECKED OFF ANY PROBLEMS, HOW DIFFICULT HAVE THESE PROBLEMS MADE IT FOR YOU TO DO YOUR WORK, TAKE CARE OF THINGS AT HOME, OR GET ALONG WITH OTHER PEOPLE: 1
3. TROUBLE FALLING OR STAYING ASLEEP: 3
4. FEELING TIRED OR HAVING LITTLE ENERGY: 3
SUM OF ALL RESPONSES TO PHQ QUESTIONS 1-9: 15
5. POOR APPETITE OR OVEREATING: 2
9. THOUGHTS THAT YOU WOULD BE BETTER OFF DEAD, OR OF HURTING YOURSELF: 0
8. MOVING OR SPEAKING SO SLOWLY THAT OTHER PEOPLE COULD HAVE NOTICED. OR THE OPPOSITE, BEING SO FIGETY OR RESTLESS THAT YOU HAVE BEEN MOVING AROUND A LOT MORE THAN USUAL: 0
SUM OF ALL RESPONSES TO PHQ QUESTIONS 1-9: 15
7. TROUBLE CONCENTRATING ON THINGS, SUCH AS READING THE NEWSPAPER OR WATCHING TELEVISION: 3
6. FEELING BAD ABOUT YOURSELF - OR THAT YOU ARE A FAILURE OR HAVE LET YOURSELF OR YOUR FAMILY DOWN: 0

## 2020-01-28 ASSESSMENT — ENCOUNTER SYMPTOMS: COLOR CHANGE: 1

## 2020-01-28 NOTE — PROGRESS NOTES
Currently   Lifestyle    Physical activity:     Days per week: Not on file     Minutes per session: Not on file    Stress: Not on file   Relationships    Social connections:     Talks on phone: Not on file     Gets together: Not on file     Attends Anabaptism service: Not on file     Active member of club or organization: Not on file     Attends meetings of clubs or organizations: Not on file     Relationship status: Not on file    Intimate partner violence:     Fear of current or ex partner: Not on file     Emotionally abused: Not on file     Physically abused: Not on file     Forced sexual activity: Not on file   Other Topics Concern    Not on file   Social History Narrative    Not on file       Family History   Problem Relation Age of Onset    Other Mother         Hypoglycemia    Other Maternal Grandmother         Thyroid issues     Cancer Maternal Grandmother         Bone cancer     Cancer Maternal Grandfather         Bone cancer        No current outpatient medications on file. No current facility-administered medications for this visit.         Immunization History   Administered Date(s) Administered    MMR 02/13/2019    Tdap (Boostrix, Adacel) 02/13/2019       Allergies   Allergen Reactions    Dye [Iodides] Other (See Comments)     Low HR    Sulfa Antibiotics Hives, Itching and Nausea Only    Vicodin [Hydrocodone-Acetaminophen] Other (See Comments)     Low HR     Codeine Palpitations    Pcn [Penicillins] Rash       Orders Only on 10/01/2019   Component Date Value Ref Range Status    Sodium 10/01/2019 144  136 - 145 mmol/L Final    Potassium 10/01/2019 4.2  3.5 - 5.1 mmol/L Final    Chloride 10/01/2019 106  99 - 110 mmol/L Final    CO2 10/01/2019 23  21 - 32 mmol/L Final    Anion Gap 10/01/2019 15  3 - 16 Final    Glucose 10/01/2019 81  70 - 99 mg/dL Final    BUN 10/01/2019 19  7 - 20 mg/dL Final    CREATININE 10/01/2019 0.8  0.6 - 1.1 mg/dL Final    GFR Non-African American 10/01/2019 >60  >60 Final    Comment: >60 mL/min/1.73m2 EGFR, calc. for ages 25 and older using the  MDRD formula (not corrected for weight), is valid for stable  renal function.  GFR  10/01/2019 >60  >60 Final    Comment: Chronic Kidney Disease: less than 60 ml/min/1.73 sq.m. Kidney Failure: less than 15 ml/min/1.73 sq.m. Results valid for patients 18 years and older.  Calcium 10/01/2019 10.3  8.3 - 10.6 mg/dL Final    Phosphorus 10/01/2019 2.7  2.5 - 4.9 mg/dL Final    Alb 10/01/2019 4.8  3.4 - 5.0 g/dL Final    WBC 10/01/2019 7.4  4.0 - 11.0 K/uL Final    RBC 10/01/2019 4.59  4.00 - 5.20 M/uL Final    Hemoglobin 10/01/2019 14.2  12.0 - 16.0 g/dL Final    Hematocrit 10/01/2019 41.4  36.0 - 48.0 % Final    MCV 10/01/2019 90.2  80.0 - 100.0 fL Final    MCH 10/01/2019 31.0  26.0 - 34.0 pg Final    MCHC 10/01/2019 34.3  31.0 - 36.0 g/dL Final    RDW 10/01/2019 13.1  12.4 - 15.4 % Final    Platelets 66/48/9697 299  135 - 450 K/uL Final    MPV 10/01/2019 7.7  5.0 - 10.5 fL Final       Review of Systems   Endocrine: Positive for cold intolerance. Skin: Positive for color change (hands and feet after showering). Bumps on her face. Allergic/Immunologic: Negative for environmental allergies. Psychiatric/Behavioral: Positive for dysphoric mood. /78 (Site: Left Upper Arm, Position: Sitting, Cuff Size: Medium Adult)   Pulse 77   Temp 98.3 °F (36.8 °C) (Oral)   Resp 16   Ht 5' 2\" (1.575 m)   Wt 151 lb (68.5 kg)   LMP 01/01/2020 (Approximate)   SpO2 100%   Breastfeeding No   BMI 27.62 kg/m²     Physical Exam  Vitals signs reviewed. Constitutional:       Appearance: She is well-developed. HENT:      Head: Normocephalic and atraumatic. Eyes:      Pupils: Pupils are equal, round, and reactive to light. Neck:      Musculoskeletal: Normal range of motion. Cardiovascular:      Rate and Rhythm: Normal rate and regular rhythm.       Heart sounds:

## 2020-02-21 ENCOUNTER — TELEPHONE (OUTPATIENT)
Dept: FAMILY MEDICINE CLINIC | Age: 35
End: 2020-02-21

## 2020-02-21 DIAGNOSIS — L53.9 HAND ERYTHEMA: ICD-10-CM

## 2020-02-21 DIAGNOSIS — L53.9 FOOT ERYTHEMA: Primary | ICD-10-CM

## 2020-02-26 RX ORDER — NIFEDIPINE 30 MG/1
30 TABLET, EXTENDED RELEASE ORAL DAILY
Qty: 30 TABLET | Refills: 0 | Status: SHIPPED | OUTPATIENT
Start: 2020-02-26 | End: 2020-04-08

## 2020-04-02 ENCOUNTER — TELEPHONE (OUTPATIENT)
Dept: FAMILY MEDICINE CLINIC | Age: 35
End: 2020-04-02

## 2020-04-02 ENCOUNTER — VIRTUAL VISIT (OUTPATIENT)
Dept: FAMILY MEDICINE CLINIC | Age: 35
End: 2020-04-02

## 2020-04-02 ENCOUNTER — VIRTUAL VISIT (OUTPATIENT)
Dept: FAMILY MEDICINE CLINIC | Age: 35
End: 2020-04-02
Payer: COMMERCIAL

## 2020-04-02 PROCEDURE — 99442 PR PHYS/QHP TELEPHONE EVALUATION 11-20 MIN: CPT | Performed by: FAMILY MEDICINE

## 2020-04-02 RX ORDER — ALBUTEROL SULFATE 90 UG/1
2 AEROSOL, METERED RESPIRATORY (INHALATION) 4 TIMES DAILY PRN
Qty: 1 INHALER | Refills: 0 | Status: SHIPPED | OUTPATIENT
Start: 2020-04-02 | End: 2022-01-28 | Stop reason: SDUPTHER

## 2020-04-02 RX ORDER — ONDANSETRON 4 MG/1
4 TABLET, ORALLY DISINTEGRATING ORAL EVERY 8 HOURS PRN
COMMUNITY
Start: 2020-03-23 | End: 2021-04-15

## 2020-04-02 RX ORDER — HYDROCODONE BITARTRATE AND ACETAMINOPHEN 5; 325 MG/1; MG/1
TABLET ORAL
COMMUNITY
Start: 2020-03-23 | End: 2020-04-08

## 2020-04-02 RX ORDER — IBUPROFEN 800 MG/1
TABLET ORAL
COMMUNITY
Start: 2020-03-23 | End: 2020-05-21 | Stop reason: SDUPTHER

## 2020-04-02 NOTE — PROGRESS NOTES
Óscar Yuan is a 28 y.o. female evaluated via telephone on 4/2/2020. Consent:  She and/or health care decision maker is aware that that she may receive a bill for this telephone service, depending on her insurance coverage, and has provided verbal consent to proceed: YES - Consent obtained within the last 12 months      Documentation:  I communicated with the patient and/or health care decision maker about - Asthma and Follow-up for skin discoloration. Details of this discussion including any medical advice provided:     Pt today requested an inhaler to help with her asthma symptoms, a request was made to pt to set up this call to discuss her current symptoms as well as to follow-up on her bilateral foot erythema. Pt was seen in [facility] ED on 03/23/20 for abdominal pain and dx with right ovarian cyst.    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 Bilateral Foot Erythema: Was prescribe Nifedipine 30 mg XR on 02/26/2020. States today that she thinks she may have not picked up the medicine from the pharmacy, feet no longer turning blue since weather has warmed up, still has some erythema and itching, periodic numbness in toes    Ovarian Cyst: pain resolved 1 1/2 days after being seen in the ED. I AFFIRM this is a Patient Initiated Episode with an Established Patient who has not had a related appointment within my department in the past 7 days or scheduled within the next 24 hours.     Total Time: 11-20 minutes    A/P:  1) Moderate persistent asthma with Acute Exacerbation - Albuterol HFA inhaler, pt will call our office early next week with status, instructed to call on call provider with worsening sx despite inhaler use    2) Bilateral Foot Erythema - Improved, counseled pt to monitor and not start Nifedipine at this time, pt will let our office know if sx do not continue to improve    3) Right Ovarian Cyst - pain has resolved    Note: not billable if this call serves to triage the patient into an appointment for the relevant concern      Gabriella Montelongo

## 2020-04-04 ENCOUNTER — NURSE TRIAGE (OUTPATIENT)
Dept: OTHER | Facility: CLINIC | Age: 35
End: 2020-04-04

## 2020-04-07 NOTE — PROGRESS NOTES
despite inhaler use     2) Bilateral Foot Erythema - Improved, counseled pt to monitor and not start Nifedipine at this time, pt will let our office know if sx do not continue to improve     3) Right Ovarian Cyst - pain has resolved     Note: not billable if this call serves to triage the patient into an appointment for the relevant concern        Diana Ferrer

## 2020-04-08 ENCOUNTER — TELEMEDICINE (OUTPATIENT)
Dept: FAMILY MEDICINE CLINIC | Age: 35
End: 2020-04-08
Payer: COMMERCIAL

## 2020-04-08 ENCOUNTER — TELEPHONE (OUTPATIENT)
Dept: FAMILY MEDICINE CLINIC | Age: 35
End: 2020-04-08

## 2020-04-08 PROCEDURE — G8427 DOCREV CUR MEDS BY ELIG CLIN: HCPCS | Performed by: FAMILY MEDICINE

## 2020-04-08 PROCEDURE — 99213 OFFICE O/P EST LOW 20 MIN: CPT | Performed by: FAMILY MEDICINE

## 2020-04-08 PROCEDURE — G8419 CALC BMI OUT NRM PARAM NOF/U: HCPCS | Performed by: FAMILY MEDICINE

## 2020-04-08 PROCEDURE — 1036F TOBACCO NON-USER: CPT | Performed by: FAMILY MEDICINE

## 2020-04-08 NOTE — PROGRESS NOTES
person/place/time [x]Able to follow commands      Eyes:  EOM    [x]  Normal  [] Abnormal-  Sclera  []  Normal  [] Abnormal -         Discharge []  None visible  [] Abnormal -    Pulmonary/Chest: [] Respiratory effort normal.  [x] No visualized signs of difficulty breathing or respiratory distress        [] Abnormal-         Neurological:        [x] No Facial Asymmetry (Cranial nerve 7 motor function) (limited exam to video visit)          [] No gaze palsy        [] Abnormal-                  Psychiatric:       [x] Normal Affect [] No Hallucinations        [] Abnormal-     Other pertinent observable physical exam findings-     ASSESSMENT/PLAN:  1. Chronic daily headache  - Will not prescribe Adipex d/t contraindication of long term use and no data supporting use for migraines  - Allegra OTC daily  2. Memory difficulties  - Neurology, [facility]  3. History of traumatic head injury  - Neurology, [facility]      No follow-ups on file. Yrn Gonzalez is a 28 y.o. female being evaluated by a Virtual Visit (video visit) encounter to address concerns as mentioned above. A caregiver was present when appropriate. Due to this being a TeleHealth encounter (During [place] [address] public health emergency), evaluation of the following organ systems was limited: Vitals/Constitutional/EENT/Resp/CV/GI//MS/Neuro/Skin/Heme-Lymph-Imm. Pursuant to the emergency declaration under the [address] [place] [address], [address] [place] [address] authority and the [employer] and [employer] Act, this Virtual Visit was conducted with patient's (and/or legal guardian's) consent, to reduce the patient's risk of exposure to COVID-19 and provide necessary medical care. The patient (and/or legal guardian) has also been advised to contact this office for worsening conditions or problems, and seek emergency medical treatment and/or call 911 if deemed necessary.      Services were provided through a video synchronous discussion virtually to substitute for in-person clinic visit. Patient and provider were located at their individual homes. --Abdelrahman Miller DO on 4/8/2020 at 11:05 AM    An electronic signature was used to authenticate this note.

## 2020-04-28 ENCOUNTER — TELEPHONE (OUTPATIENT)
Dept: FAMILY MEDICINE CLINIC | Age: 35
End: 2020-04-28

## 2020-05-04 ENCOUNTER — TELEPHONE (OUTPATIENT)
Dept: ADMINISTRATIVE | Age: 35
End: 2020-05-04

## 2020-05-21 ENCOUNTER — VIRTUAL VISIT (OUTPATIENT)
Dept: FAMILY MEDICINE CLINIC | Age: 35
End: 2020-05-21
Payer: COMMERCIAL

## 2020-05-21 VITALS — WEIGHT: 150 LBS | HEIGHT: 63 IN | BODY MASS INDEX: 26.58 KG/M2

## 2020-05-21 PROCEDURE — G0444 DEPRESSION SCREEN ANNUAL: HCPCS | Performed by: FAMILY MEDICINE

## 2020-05-21 PROCEDURE — G8427 DOCREV CUR MEDS BY ELIG CLIN: HCPCS | Performed by: FAMILY MEDICINE

## 2020-05-21 PROCEDURE — 1036F TOBACCO NON-USER: CPT | Performed by: FAMILY MEDICINE

## 2020-05-21 PROCEDURE — G8419 CALC BMI OUT NRM PARAM NOF/U: HCPCS | Performed by: FAMILY MEDICINE

## 2020-05-21 PROCEDURE — G8431 POS CLIN DEPRES SCRN F/U DOC: HCPCS | Performed by: FAMILY MEDICINE

## 2020-05-21 PROCEDURE — 99213 OFFICE O/P EST LOW 20 MIN: CPT | Performed by: FAMILY MEDICINE

## 2020-05-21 RX ORDER — IBUPROFEN 800 MG/1
800 TABLET ORAL DAILY
Qty: 120 TABLET | Refills: 0 | Status: SHIPPED | OUTPATIENT
Start: 2020-05-21 | End: 2022-01-06

## 2020-05-21 ASSESSMENT — PATIENT HEALTH QUESTIONNAIRE - PHQ9
6. FEELING BAD ABOUT YOURSELF - OR THAT YOU ARE A FAILURE OR HAVE LET YOURSELF OR YOUR FAMILY DOWN: 2
4. FEELING TIRED OR HAVING LITTLE ENERGY: 3
1. LITTLE INTEREST OR PLEASURE IN DOING THINGS: 3
SUM OF ALL RESPONSES TO PHQ9 QUESTIONS 1 & 2: 6
3. TROUBLE FALLING OR STAYING ASLEEP: 3
9. THOUGHTS THAT YOU WOULD BE BETTER OFF DEAD, OR OF HURTING YOURSELF: 0
7. TROUBLE CONCENTRATING ON THINGS, SUCH AS READING THE NEWSPAPER OR WATCHING TELEVISION: 2
SUM OF ALL RESPONSES TO PHQ QUESTIONS 1-9: 18
8. MOVING OR SPEAKING SO SLOWLY THAT OTHER PEOPLE COULD HAVE NOTICED. OR THE OPPOSITE, BEING SO FIGETY OR RESTLESS THAT YOU HAVE BEEN MOVING AROUND A LOT MORE THAN USUAL: 0
10. IF YOU CHECKED OFF ANY PROBLEMS, HOW DIFFICULT HAVE THESE PROBLEMS MADE IT FOR YOU TO DO YOUR WORK, TAKE CARE OF THINGS AT HOME, OR GET ALONG WITH OTHER PEOPLE: 2
5. POOR APPETITE OR OVEREATING: 2
2. FEELING DOWN, DEPRESSED OR HOPELESS: 3
SUM OF ALL RESPONSES TO PHQ QUESTIONS 1-9: 18

## 2020-05-21 NOTE — PROGRESS NOTES
2020    TELEHEALTH EVALUATION -- Audio/Visual (During [facility]-56 public health emergency)    HPI:    Neal Lanza (:  1985) has requested an audio/video evaluation for the following concern(s):    Pt presents today via doxy. me Video visit for Neurology follow-up for daily HA's, memory difficulty, and hx of traumatic head injury and depression. Neurology: Referred to Dr. Dailey, saw 04/10/2020, EEG., MRI, RPR, TSH/T4, and B12/Folate ordered. States that she was told labs were normal as well as MRI. EEG showed something in the frontal lobe but pt unsure exactly what it is, had Neurology F/U today, was told to stop Elavil (didn't help), was prescribed Zanaflex, and told to F/U in 4 weeks for possible nerve block. Admits to worsening anxiety and feeling depressed, also admits to sleep issues and worsening memory, is under a lot of stress, denies SI or thoughts of hurting others. Sees a therapist.           Review of Systems   Musculoskeletal: Positive for neck pain. Psychiatric/Behavioral: Positive for dysphoric mood and sleep disturbance. Negative for suicidal ideas. The patient is nervous/anxious. Positive for memory issues       Prior to Visit Medications    Medication Sig Taking?  Authorizing Provider   ibuprofen (ADVIL;MOTRIN) 800 MG tablet Take 1 tablet by mouth daily Yes Rolando Smyth DO   sertraline (ZOLOFT) 50 MG tablet Take 1 tablet by mouth daily Yes Rolando Smyth DO   albuterol sulfate HFA (VENTOLIN HFA) 108 (90 Base) MCG/ACT inhaler Inhale 2 puffs into the lungs 4 times daily as needed for Wheezing Yes Rolando Smyth DO   ondansetron (ZOFRAN-ODT) 4 MG disintegrating tablet Take 4 mg by mouth every 8 hours as needed Yes Historical Provider, MD       Social History     Tobacco Use    Smoking status: Never Smoker    Smokeless tobacco: Never Used   Substance Use Topics    Alcohol use: No    Drug use: No        Allergies   Allergen Reactions    Dye [Iodides] Other

## 2020-05-26 ENCOUNTER — TELEPHONE (OUTPATIENT)
Dept: FAMILY MEDICINE CLINIC | Age: 35
End: 2020-05-26

## 2020-06-11 ENCOUNTER — TELEPHONE (OUTPATIENT)
Dept: FAMILY MEDICINE CLINIC | Age: 35
End: 2020-06-11

## 2020-06-15 ENCOUNTER — HOSPITAL ENCOUNTER (EMERGENCY)
Age: 35
Discharge: LEFT BEFORE DISCHARGE | End: 2020-06-15
Payer: COMMERCIAL

## 2020-06-15 ENCOUNTER — APPOINTMENT (OUTPATIENT)
Dept: GENERAL RADIOLOGY | Age: 35
End: 2020-06-15
Payer: COMMERCIAL

## 2020-06-15 VITALS
WEIGHT: 142 LBS | HEIGHT: 62 IN | TEMPERATURE: 98.9 F | DIASTOLIC BLOOD PRESSURE: 80 MMHG | BODY MASS INDEX: 26.13 KG/M2 | HEART RATE: 88 BPM | OXYGEN SATURATION: 100 % | RESPIRATION RATE: 20 BRPM | SYSTOLIC BLOOD PRESSURE: 127 MMHG

## 2020-06-15 PROCEDURE — 71046 X-RAY EXAM CHEST 2 VIEWS: CPT

## 2020-06-15 PROCEDURE — 4500000002 HC ER NO CHARGE

## 2020-06-15 ASSESSMENT — PAIN DESCRIPTION - DESCRIPTORS: DESCRIPTORS: ACHING

## 2020-06-15 ASSESSMENT — PAIN SCALES - GENERAL: PAINLEVEL_OUTOF10: 8

## 2020-06-15 ASSESSMENT — PAIN DESCRIPTION - LOCATION: LOCATION: OTHER (COMMENT)

## 2020-06-15 ASSESSMENT — PAIN DESCRIPTION - ORIENTATION: ORIENTATION: OTHER (COMMENT)

## 2020-06-15 ASSESSMENT — PAIN DESCRIPTION - PAIN TYPE: TYPE: ACUTE PAIN

## 2020-06-16 NOTE — ED PROVIDER NOTES
I did not evaluate this patient she eloped prior to my assessment      Arleth Christiansen PA-C  06/16/20 0020       Elizabeth Jules PA-C  06/16/20 0021

## 2020-07-30 ENCOUNTER — TELEPHONE (OUTPATIENT)
Dept: FAMILY MEDICINE CLINIC | Age: 35
End: 2020-07-30

## 2020-07-30 NOTE — TELEPHONE ENCOUNTER
I scheduled patient appointment for 08/06/2020 Patients phones were not working I sent My Chart Info about appointment. We need a good phone number on this patient.

## 2021-01-08 ENCOUNTER — NURSE TRIAGE (OUTPATIENT)
Dept: OTHER | Facility: CLINIC | Age: 36
End: 2021-01-08

## 2021-01-08 NOTE — TELEPHONE ENCOUNTER
States she had a mischarage on Sunday even though she has had her tubes tied. Went to the ED this week for chest pain. Reason for Disposition   [1] MODERATE pain (e.g., interferes with normal activities) AND [2] pain comes and goes (cramps) AND [3] present > 24 hours  (Exception: pain with Vomiting or Diarrhea - see that Guideline)   Symptoms have improved or gone away  (i.e., vaginal bleeding, abdominal pain)    Answer Assessment - Initial Assessment Questions  1. LOCATION: \"Where does it hurt? \"       Upper, right side. 2. RADIATION: \"Does the pain shoot anywhere else? \" (e.g., chest, back)      Pain in her right arm when she went to the ED and that has resolved. Lower back pain. 3. ONSET: \"When did the pain begin? \" (e.g., minutes, hours or days ago)       A couple weeks ago     4. SUDDEN: \"Gradual or sudden onset? \"      5 minutes after eating it hits. 5. PATTERN \"Does the pain come and go, or is it constant? \"     - If constant: \"Is it getting better, staying the same, or worsening? \"       (Note: Constant means the pain never goes away completely; most serious pain is constant and it progresses)      - If intermittent: \"How long does it last?\" \"Do you have pain now? \"      (Note: Intermittent means the pain goes away completely between bouts)      Comes and goes only when eating. 6. SEVERITY: \"How bad is the pain? \"  (e.g., Scale 1-10; mild, moderate, or severe)    - MILD (1-3): doesn't interfere with normal activities, abdomen soft and not tender to touch     - MODERATE (4-7): interferes with normal activities or awakens from sleep, tender to touch     - SEVERE (8-10): excruciating pain, doubled over, unable to do any normal activities       8/10    7. RECURRENT SYMPTOM: \"Have you ever had this type of abdominal pain before? \" If so, ask: \"When was the last time? \" and \"What happened that time? \"       *No Answer*  8. CAUSE: \"What do you think is causing the abdominal pain? \"      Hernia or mischarage     9. RELIEVING/AGGRAVATING FACTORS: \"What makes it better or worse? \" (e.g., movement, antacids, bowel movement)      Eating causes her pain     10. OTHER SYMPTOMS: \"Has there been any vomiting, diarrhea, constipation, or urine problems? \"        Migraines, diarrhea, sweats in the morning,     11. PREGNANCY: \"Is there any chance you are pregnant? \" \"When was your last menstrual period? \"        No, just had a mischarage    Protocols used: ABDOMINAL PAIN - FEMALE-ADULT-[facility] states she will go to urgent care once dropping off her kids. Thank you for allowing me to participate in the care of your patient. The patient was connected to triage in response to information provided to the [facility]. Please do not respond through this encounter as the response is not directed to a shared pool.

## 2021-01-21 ENCOUNTER — NURSE TRIAGE (OUTPATIENT)
Dept: OTHER | Facility: CLINIC | Age: 36
End: 2021-01-21

## 2021-01-21 ENCOUNTER — TELEPHONE (OUTPATIENT)
Dept: FAMILY MEDICINE CLINIC | Age: 36
End: 2021-01-21

## 2021-01-21 DIAGNOSIS — O20.0 THREATENED MISCARRIAGE: Primary | ICD-10-CM

## 2021-01-21 NOTE — TELEPHONE ENCOUNTER
Sailaja states she had a miscarriage 3 weeks ago. Since the miscarriage sailaja has been having numbness to the vaginal area and intermittent sharp pains to the vaginal area and abdomen. Sailaja also states she continues to gain weight, has not had a period in 3 months, has intermittent nausea and lightheadedness. Is also having intermittent \"hot flashes\". Did not have ultrasound following miscarriage. Recommended to be seen in office today. Reason for Disposition   Patient wants to be seen    Answer Assessment - Initial Assessment Questions  1. DIAGNOSIS CONFIRMATION: \"When was the threatened miscarriage (threatened ) diagnosed? \" \"By whom? \"      Had miscarriage 3 weeks ago. 2. ULTRASOUND: \"Was an ultrasound performed at that time? \"  If so, \"Do you know what it showed? \"     Denies. 3. PREGNANCY: \"Do you know how many weeks or months pregnant you are?\" \"When was the first day of your last normal menstrual period? \"     N/A  4. MAIN CONCERN: Juvenal Ask is your main concern right now? \" \"What questions do you have? \"      Buzz Presto at times and numbness to vaginal area. Continuing to gain weight. 5. VAGINAL BLEEDING: \"Describe the bleeding that you are having. \" \"How much bleeding is there? \"     - SPOTTING: spotting or pinkish / brownish mucous discharge; 1-2 pads a day    - MILD:  less than 1 pad / hour; similar to mild menstrual bleeding    - MODERATE: 1-2 pads / hour; small-medium blood clots (e.g., pea, grape, small coin)     - SEVERE: soaking 2 or more pads/hour; bleeding not contained by pads or continuous red blood from vagina; large blood clots (e.g., golf ball, large coin)       Denies. 6. ABDOMINAL PAIN: \"Do you have any abdominal pain? \"  If present, ask: \"How bad is it? \"  (e.g., Scale 1-10; mild, moderate, or severe)    - MILD (1-3): doesn't interfere with normal activities, abdomen soft and not tender to touch     - MODERATE (4-7): interferes with normal activities or awakens from sleep, tender to touch     - SEVERE (8-10): excruciating pain, doubled over, unable to do any normal activities     Intermittent abdominal pain, 9/10 pain. 7. HEMODYNAMIC STATUS: \"Are you weak or feeling lightheaded? \" If so, ask: \"Can you stand and walk normally? \"     Lightheaded at times. 8. OTHER SYMPTOMS: \"What other symptoms are you having with the bleeding? \" (e.g., passed tissue, vaginal discharge, fever, menstrual-type cramps, nausea, vomiting)  Cramps, hot flashes, nausea. Protocols used:  - THREATENED MISCARRIAGE FOLLOW-UP CALL-ADULT-[place]    Please do not respond to the triage nurse through this encounter. Any subsequent communication should be directly with the patient.

## 2021-03-24 ENCOUNTER — NURSE TRIAGE (OUTPATIENT)
Dept: OTHER | Facility: CLINIC | Age: 36
End: 2021-03-24

## 2021-03-24 ENCOUNTER — OFFICE VISIT (OUTPATIENT)
Dept: FAMILY MEDICINE CLINIC | Age: 36
End: 2021-03-24
Payer: COMMERCIAL

## 2021-03-24 VITALS
WEIGHT: 164 LBS | RESPIRATION RATE: 14 BRPM | SYSTOLIC BLOOD PRESSURE: 98 MMHG | DIASTOLIC BLOOD PRESSURE: 64 MMHG | OXYGEN SATURATION: 98 % | HEART RATE: 101 BPM | BODY MASS INDEX: 30.18 KG/M2 | TEMPERATURE: 97.4 F | HEIGHT: 62 IN

## 2021-03-24 DIAGNOSIS — R51.9 CHRONIC DAILY HEADACHE: ICD-10-CM

## 2021-03-24 DIAGNOSIS — R45.89 DEPRESSED MOOD: ICD-10-CM

## 2021-03-24 DIAGNOSIS — R53.83 FATIGUE, UNSPECIFIED TYPE: Primary | ICD-10-CM

## 2021-03-24 DIAGNOSIS — K21.9 GASTROESOPHAGEAL REFLUX DISEASE, UNSPECIFIED WHETHER ESOPHAGITIS PRESENT: ICD-10-CM

## 2021-03-24 PROCEDURE — 1036F TOBACCO NON-USER: CPT | Performed by: FAMILY MEDICINE

## 2021-03-24 PROCEDURE — 99214 OFFICE O/P EST MOD 30 MIN: CPT | Performed by: FAMILY MEDICINE

## 2021-03-24 PROCEDURE — G8417 CALC BMI ABV UP PARAM F/U: HCPCS | Performed by: FAMILY MEDICINE

## 2021-03-24 PROCEDURE — G8427 DOCREV CUR MEDS BY ELIG CLIN: HCPCS | Performed by: FAMILY MEDICINE

## 2021-03-24 PROCEDURE — G8484 FLU IMMUNIZE NO ADMIN: HCPCS | Performed by: FAMILY MEDICINE

## 2021-03-24 RX ORDER — TIZANIDINE 4 MG/1
4 TABLET ORAL 3 TIMES DAILY
Qty: 30 TABLET | Refills: 1 | Status: SHIPPED | OUTPATIENT
Start: 2021-03-24 | End: 2021-04-15

## 2021-03-24 RX ORDER — LEVETIRACETAM 500 MG/1
500 TABLET ORAL 2 TIMES DAILY
COMMUNITY
End: 2021-06-03 | Stop reason: DRUGHIGH

## 2021-03-24 RX ORDER — PANTOPRAZOLE SODIUM 20 MG/1
20 TABLET, DELAYED RELEASE ORAL DAILY
Qty: 30 TABLET | Refills: 3 | Status: SHIPPED | OUTPATIENT
Start: 2021-03-24 | End: 2021-04-15

## 2021-03-24 ASSESSMENT — COLUMBIA-SUICIDE SEVERITY RATING SCALE - C-SSRS
2. HAVE YOU ACTUALLY HAD ANY THOUGHTS OF KILLING YOURSELF?: NO
1. WITHIN THE PAST MONTH, HAVE YOU WISHED YOU WERE DEAD OR WISHED YOU COULD GO TO SLEEP AND NOT WAKE UP?: NO
6. HAVE YOU EVER DONE ANYTHING, STARTED TO DO ANYTHING, OR PREPARED TO DO ANYTHING TO END YOUR LIFE?: NO

## 2021-03-24 ASSESSMENT — PATIENT HEALTH QUESTIONNAIRE - PHQ9
SUM OF ALL RESPONSES TO PHQ QUESTIONS 1-9: 18
8. MOVING OR SPEAKING SO SLOWLY THAT OTHER PEOPLE COULD HAVE NOTICED. OR THE OPPOSITE, BEING SO FIGETY OR RESTLESS THAT YOU HAVE BEEN MOVING AROUND A LOT MORE THAN USUAL: 0
6. FEELING BAD ABOUT YOURSELF - OR THAT YOU ARE A FAILURE OR HAVE LET YOURSELF OR YOUR FAMILY DOWN: 1
2. FEELING DOWN, DEPRESSED OR HOPELESS: 3
3. TROUBLE FALLING OR STAYING ASLEEP: 3
10. IF YOU CHECKED OFF ANY PROBLEMS, HOW DIFFICULT HAVE THESE PROBLEMS MADE IT FOR YOU TO DO YOUR WORK, TAKE CARE OF THINGS AT HOME, OR GET ALONG WITH OTHER PEOPLE: 1
SUM OF ALL RESPONSES TO PHQ9 QUESTIONS 1 & 2: 6

## 2021-03-24 ASSESSMENT — ENCOUNTER SYMPTOMS
ABDOMINAL PAIN: 1
DIARRHEA: 0

## 2021-03-24 NOTE — PATIENT INSTRUCTIONS
Top taking prilosec  Take the new acid reducer medication    Check with us in 2 days about the blood

## 2021-03-24 NOTE — TELEPHONE ENCOUNTER
instructed to call back for any new or worsening symptoms. Writer provided warm transfer to [facility] for appointment scheduling. Attention Provider: Thank you for allowing me to participate in the care of your patient. The patient was connected to triage in response to information provided to the [facility]. Please do not respond through this encounter as the response is not directed to a shared pool.

## 2021-03-24 NOTE — PROGRESS NOTES
Subjective:      Patient ID: Jayde Plaza is a 39 y.o. y.o. female. HA's  Tired and low energy, chronic    Hx reviewed. ,  Complex HA pattern    3-4 days has inc HA -  Temporal and a light headed feeling.         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     Seizures (HCC)        Past Surgical History:   Procedure Laterality Date    CHOLECYSTECTOMY      TUBAL LIGATION         Social History     Socioeconomic History    Marital status: Legally      Spouse name: Not on file    Number of children: Not on file    Years of education: Not on file    Highest education level: Not on file   Occupational History    Not on file   Social Needs    Financial resource strain: Not on file    Food insecurity     Worry: Not on file     Inability: Not on file    Transportation needs     Medical: Not on file     Non-medical: Not on file   Tobacco Use    Smoking status: Never Smoker    Smokeless tobacco: Never Used   Substance and Sexual Activity    Alcohol use: No    Drug use: No    Sexual activity: Not Currently   Lifestyle    Physical activity     Days per week: Not on file     Minutes per session: Not on file    Stress: Not on file   Relationships    Social connections     Talks on phone: Not on file     Gets together: Not on file     Attends [employer] service: Not on file     Active member of club or organization: Not on file     Attends meetings of clubs or organizations: Not on file     Relationship status: Not on file    Intimate partner violence     Fear of current or ex partner: Not on file     Emotionally abused: Not on file     Physically abused: Not on file     Forced sexual activity: Not on file   Other Topics Concern    Not on file   Social History Narrative    Not on file       Family History   Problem Relation Age of Onset    Other Mother         Hypoglycemia    Other Maternal Grandmother         Thyroid issues     Cancer Maternal Grandmother         Bone cancer     Cancer Maternal Grandfather         Bone cancer        Vitals:    03/24/21 1020   BP: 98/64   Pulse: 101   Resp: 14   Temp: 97.4 °F (36.3 °C)   SpO2: 98%       Wt Readings from Last 3 Encounters:   03/24/21 164 lb (74.4 kg)   06/15/20 142 lb (64.4 kg)   05/21/20 150 lb (68 kg)       Review of Systems   Constitutional: Positive for unexpected weight change. Weight up in the past 6 months   Gastrointestinal: Positive for abdominal pain. Negative for diarrhea. Heartburn sx. Taking OTC prilosec-  Not a lot better  Avoids spices    GB is out. .   Genitourinary: Positive for menstrual problem. Negative for dysuria and frequency. Periods have been different. Last one was 5-6 weeks,  Thinks has finally stopped. Musculoskeletal:        Left upper back / neck area sore / tight  No referred sx   Neurological: Positive for light-headedness and headaches. Psychiatric/Behavioral: Positive for dysphoric mood. Not sure the depression med is really effective. No dark thoughts   Low energy / mood    Recent divorce, just finalized 1 month ago       Objective:   Physical Exam  Vitals signs reviewed. Constitutional:       General: She is not in acute distress. HENT:      Right Ear: Tympanic membrane normal.      Left Ear: Tympanic membrane normal.      Nose: Nose normal. No congestion. Eyes:      General: No scleral icterus.      Extraocular Movements: Extraocular movements intact. Pupils: Pupils are equal, round, and reactive to light. Neck:      Musculoskeletal: No neck rigidity. Pulmonary:      Effort: Pulmonary effort is normal.      Breath sounds: Normal breath sounds. Abdominal:      General: Abdomen is flat. Bowel sounds are normal.      Palpations: Abdomen is soft. There is no mass. Tenderness: There is no abdominal tenderness. Musculoskeletal:      Comments: Cervical motion generally OK  Left trap dysfunction / tenderness   Lymphadenopathy:      Cervical: No cervical adenopathy. Skin:     General: Skin is warm and dry. Neurological:      Mental Status: She is alert and oriented to person, place, and time. Psychiatric:      Comments: Affect a bit flat. No overt crying or behavior         Assessment:      Fatigue  Cephalgia  Dyspepsia  Depressive disorder      Plan: Will check anemia with hx of heavy period    Mixed HA- muscle tension / tension  Depression, not in remission. Check anemia  Tizanidine for the HA  Stop Prilosec  Pantoprazole    Will talk to Dr David Garcia,  Does zoloft need adjusted    Current Outpatient Medications   Medication Sig Dispense Refill    levETIRAcetam (KEPPRA) 500 MG tablet Take 500 mg by mouth 2 times daily      ibuprofen (ADVIL;MOTRIN) 800 MG tablet Take 1 tablet by mouth daily 120 tablet 0    sertraline (ZOLOFT) 50 MG tablet Take 1 tablet by mouth daily 30 tablet 1    albuterol sulfate HFA (VENTOLIN HFA) 108 (90 Base) MCG/ACT inhaler Inhale 2 puffs into the lungs 4 times daily as needed for Wheezing 1 Inhaler 0    ondansetron (ZOFRAN-ODT) 4 MG disintegrating tablet Take 4 mg by mouth every 8 hours as needed       No current facility-administered medications for this visit.

## 2021-03-24 NOTE — Clinical Note
Patient feels her depression is not doing great. I have ordered lab and evaluation for her fatigue. Changed her Prilosec to pantoprazole because of acid reflux. I will be having her come back to see you regarding possible change your dose adjustment on her depression medicine.

## 2021-03-25 LAB
A/G RATIO: 1.6 (ref 1.1–2.2)
ALBUMIN SERPL-MCNC: 4.9 G/DL (ref 3.4–5)
ALP BLD-CCNC: 80 U/L (ref 40–129)
ALT SERPL-CCNC: 20 U/L (ref 10–40)
ANION GAP SERPL CALCULATED.3IONS-SCNC: 13 MMOL/L (ref 3–16)
AST SERPL-CCNC: 19 U/L (ref 15–37)
BASOPHILS ABSOLUTE: 0.1 K/UL (ref 0–0.2)
BASOPHILS RELATIVE PERCENT: 0.5 %
BILIRUB SERPL-MCNC: 0.9 MG/DL (ref 0–1)
BUN BLDV-MCNC: 21 MG/DL (ref 7–20)
CALCIUM SERPL-MCNC: 11 MG/DL (ref 8.3–10.6)
CHLORIDE BLD-SCNC: 103 MMOL/L (ref 99–110)
CO2: 23 MMOL/L (ref 21–32)
CREAT SERPL-MCNC: 0.8 MG/DL (ref 0.6–1.1)
EOSINOPHILS ABSOLUTE: 0.1 K/UL (ref 0–0.6)
EOSINOPHILS RELATIVE PERCENT: 0.5 %
GFR AFRICAN AMERICAN: >60
GFR NON-AFRICAN AMERICAN: >60
GLOBULIN: 3 G/DL
GLUCOSE BLD-MCNC: 78 MG/DL (ref 70–99)
HCT VFR BLD CALC: 43.5 % (ref 36–48)
HEMOGLOBIN: 14.9 G/DL (ref 12–16)
LYMPHOCYTES ABSOLUTE: 1.7 K/UL (ref 1–5.1)
LYMPHOCYTES RELATIVE PERCENT: 17.5 %
MCH RBC QN AUTO: 30.9 PG (ref 26–34)
MCHC RBC AUTO-ENTMCNC: 34.1 G/DL (ref 31–36)
MCV RBC AUTO: 90.4 FL (ref 80–100)
MONOCYTES ABSOLUTE: 0.5 K/UL (ref 0–1.3)
MONOCYTES RELATIVE PERCENT: 5.3 %
NEUTROPHILS ABSOLUTE: 7.4 K/UL (ref 1.7–7.7)
NEUTROPHILS RELATIVE PERCENT: 76.2 %
PDW BLD-RTO: 13.2 % (ref 12.4–15.4)
PLATELET # BLD: 336 K/UL (ref 135–450)
PMV BLD AUTO: 7.6 FL (ref 5–10.5)
POTASSIUM SERPL-SCNC: 4.4 MMOL/L (ref 3.5–5.1)
RBC # BLD: 4.81 M/UL (ref 4–5.2)
SODIUM BLD-SCNC: 139 MMOL/L (ref 136–145)
TOTAL PROTEIN: 7.9 G/DL (ref 6.4–8.2)
TSH REFLEX: 1.15 UIU/ML (ref 0.27–4.2)
WBC # BLD: 9.7 K/UL (ref 4–11)

## 2021-04-15 ENCOUNTER — OFFICE VISIT (OUTPATIENT)
Dept: FAMILY MEDICINE CLINIC | Age: 36
End: 2021-04-15
Payer: COMMERCIAL

## 2021-04-15 VITALS
RESPIRATION RATE: 16 BRPM | WEIGHT: 169 LBS | HEIGHT: 63 IN | OXYGEN SATURATION: 98 % | DIASTOLIC BLOOD PRESSURE: 70 MMHG | TEMPERATURE: 98.5 F | BODY MASS INDEX: 29.95 KG/M2 | HEART RATE: 71 BPM | SYSTOLIC BLOOD PRESSURE: 98 MMHG

## 2021-04-15 DIAGNOSIS — Z00.00 ANNUAL PHYSICAL EXAM: Primary | ICD-10-CM

## 2021-04-15 DIAGNOSIS — H53.8 BLURRED VISION, RIGHT EYE: ICD-10-CM

## 2021-04-15 DIAGNOSIS — L53.9 FOOT ERYTHEMA: ICD-10-CM

## 2021-04-15 PROCEDURE — 99395 PREV VISIT EST AGE 18-39: CPT | Performed by: FAMILY MEDICINE

## 2021-04-15 ASSESSMENT — ENCOUNTER SYMPTOMS
BACK PAIN: 0
COLOR CHANGE: 1
ABDOMINAL PAIN: 1

## 2021-04-15 ASSESSMENT — PATIENT HEALTH QUESTIONNAIRE - PHQ9
SUM OF ALL RESPONSES TO PHQ QUESTIONS 1-9: 1
SUM OF ALL RESPONSES TO PHQ9 QUESTIONS 1 & 2: 1
1. LITTLE INTEREST OR PLEASURE IN DOING THINGS: 0
2. FEELING DOWN, DEPRESSED OR HOPELESS: 1

## 2021-04-15 NOTE — PROGRESS NOTES
Oj Linares  YOB: 1985    Date of Service:  4/15/2021    Chief Complaint:   Oj Linares is a 39 y.o. female who presents for complete physical examination.     HPI:  HPI    Labs done on March 24, 2021 within normal limits except for calcium 11 (corrected Ca++ 10.28)    Hx abnormal PAP: no  Sexual activity: none   Self-breast exams: yes  Previous DEXA scan: no  Last eye exam: January 2021, abnormal - right eye change in vision  Exercise: no regular exercise  Seatbelt use: yes  Are You a Spiritual Person: yes  Advance Directive: no    Wt Readings from Last 3 Encounters:   04/15/21 169 lb (76.7 kg)   03/24/21 164 lb (74.4 kg)   06/15/20 142 lb (64.4 kg)     BP Readings from Last 3 Encounters:   04/15/21 98/70   03/24/21 98/64   06/15/20 127/80       Patient Active Problem List   Diagnosis    Urinary tract infection with hematuria    Allergic rhinitis    Congenital bronchial atresia    Extrinsic asthma    Postoperative abdominal pain    Other chest pain    SOB (shortness of breath)    Palpitations       Health Maintenance   Topic Date Due    Hepatitis C screen  Never done    Varicella vaccine (1 of 2 - 2-dose childhood series) Never done    Pneumococcal 0-64 years Vaccine (1 of 1 - PPSV23) Never done    HIV screen  Never done    COVID-19 Vaccine (1) Never done    Cervical cancer screen  Never done    Flu vaccine (Season Ended) 09/01/2021    DTaP/Tdap/Td vaccine (2 - Td) 02/13/2029    Hepatitis A vaccine  Aged Out    Hepatitis B vaccine  Aged Out    Hib vaccine  Aged Out    Meningococcal (ACWY) vaccine  Aged [employer] History   Administered Date(s) Administered    MMR 02/13/2019    Tdap (Boostrix, Adacel) 02/13/2019       Allergies   Allergen Reactions    Dye [Iodides] Other (See Comments)     Low HR    Sulfa Antibiotics Hives, Itching and Nausea Only    Vicodin [Hydrocodone-Acetaminophen] Other (See Comments)     Low HR     Codeine Palpitations    Pcn [Penicillins] Rash     Outpatient Medications Marked as Taking for the 4/15/21 encounter (Office Visit) with Kirill Mccoy, DO   Medication Sig Dispense Refill    levETIRAcetam (KEPPRA) 500 MG tablet Take 500 mg by mouth 2 times daily      ibuprofen (ADVIL;MOTRIN) 800 MG tablet Take 1 tablet by mouth daily 120 tablet 0    sertraline (ZOLOFT) 50 MG tablet Take 1 tablet by mouth daily 30 tablet 1    albuterol sulfate HFA (VENTOLIN HFA) 108 (90 Base) MCG/ACT inhaler Inhale 2 puffs into the lungs 4 times daily as needed for Wheezing 1 Inhaler 0       Past Medical History:   Diagnosis Date    ADHD (attention deficit hyperactivity disorder)     Allergic rhinitis 7/25/2019    Asthma     Kidney calculi     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     Seizures (HCC)      Past Surgical History:   Procedure Laterality Date    CHOLECYSTECTOMY      TUBAL LIGATION       Family History   Problem Relation Age of Onset    Other Mother         Hypoglycemia    Other Maternal Grandmother         Thyroid issues     Cancer Maternal Grandmother         Bone cancer     Diabetes Maternal Grandmother     Cancer Maternal Grandfather         Bone cancer     Diabetes Sister      Social History     Socioeconomic History    Marital status: Legally      Spouse name: Not on file    Number of children: Not on file    Years of education: Not on file    Highest education level: Not on file   Occupational History    Not on file   Social Needs    Financial resource strain: Not on file    Food insecurity     Worry: Not on file     Inability: Not on file    Transportation needs     Medical: Not on file     Non-medical: Not on file   Tobacco Use    Smoking status: Never Smoker    Smokeless tobacco: Never Used   Substance and Sexual Activity    Alcohol use: No    Drug use: No    Sexual activity: Not Currently   Lifestyle    Physical activity     Days per week: Not on file     Minutes per session: Not on file    Stress: Not on file   Relationships    Social connections     Talks on phone: Not on file     Gets together: Not on file     Attends Mu-ism service: Not on file     Active member of club or organization: Not on file     Attends meetings of clubs or organizations: Not on file     Relationship status: Not on file    Intimate partner violence     Fear of current or ex partner: Not on file     Emotionally abused: Not on file     Physically abused: Not on file     Forced sexual activity: Not on file   Other Topics Concern    Not on file   Social History Narrative    Not on file       Reviewof Systems:  Review of Systems   Constitutional: Negative for fatigue. HENT: Negative for congestion. Eyes: Positive for visual disturbance. Cardiovascular: Negative for chest pain. Gastrointestinal: Positive for abdominal pain (LLQ). Endocrine: Positive for heat intolerance. Genitourinary: Negative for difficulty urinating. Musculoskeletal: Positive for neck pain. Negative for back pain. Skin: Positive for color change (feet). Allergic/Immunologic: Positive for environmental allergies. Neurological: Positive for dizziness (periodic) and headaches. Hematological: Bruises/bleeds easily (bruising). Psychiatric/Behavioral: Negative for dysphoric mood. The patient is nervous/anxious. PhysicalExam:   Vitals:    04/15/21 1030   BP: 98/70   Site: Left Upper Arm   Position: Sitting   Pulse: 71   Resp: 16   Temp: 98.5 °F (36.9 °C)   TempSrc: Temporal   SpO2: 98%   Weight: 169 lb (76.7 kg)   Height: 5' 3\" (1.6 m)     Body mass index is 29.94 kg/m². Physical Exam  Vitals signs reviewed. Constitutional:       Appearance: She is well-developed. HENT:      Head: Normocephalic and atraumatic.       Right Ear: Tympanic membrane and external ear normal.      Left Ear: Tympanic membrane and external ear normal.      Nose: Nose normal.      Mouth/Throat:      Mouth: Mucous membranes are moist.      Pharynx: No oropharyngeal exudate or posterior oropharyngeal erythema. Eyes:      Pupils: Pupils are equal, round, and reactive to light. Neck:      Musculoskeletal: Normal range of motion. No neck rigidity. Cardiovascular:      Rate and Rhythm: Normal rate and regular rhythm. Heart sounds: Normal heart sounds. No murmur. Pulmonary:      Effort: Pulmonary effort is normal.      Breath sounds: Normal breath sounds. No wheezing. Abdominal:      General: Bowel sounds are normal.      Palpations: Abdomen is soft. Tenderness: There is no abdominal tenderness. Musculoskeletal: Normal range of motion. Comments: Bilateral UE/LE normal ROM   Skin:     General: Skin is warm and dry. Neurological:      Mental Status: She is alert and oriented to person, place, and time. Cranial Nerves: No cranial nerve deficit. Sensory: No sensory deficit. Motor: No weakness. Coordination: Coordination normal.      Gait: Gait normal.      Deep Tendon Reflexes: Reflexes are normal and symmetric. Reflexes normal.   Psychiatric:         Behavior: Behavior normal.         Thought Content: Thought content normal.         Judgment: Judgment normal.         Assessment/Plan:   Diagnosis Orders   1. Annual physical exam     2. Blurred vision, right eye  RUPERTO - Dorothy Goldberg MD, Ophthalmology, [facility]   3. Foot erythema  Juliano Alarcon MD, Dermatology, [facility]     Return in about 1 month (around 5/15/2021) for OMT. Prior to Visit Medications    Medication Sig Taking?  Authorizing Provider   levETIRAcetam (KEPPRA) 500 MG tablet Take 500 mg by mouth 2 times daily Yes Historical Provider, MD   ibuprofen (ADVIL;MOTRIN) 800 MG tablet Take 1 tablet by mouth daily Yes Bree Clink, DO   sertraline (ZOLOFT) 50 MG tablet Take 1 tablet by mouth daily Yes Bree Clink, DO   albuterol sulfate HFA (VENTOLIN HFA) 108 (90 Base) MCG/ACT inhaler Inhale 2 puffs into the lungs 4 times daily as needed for

## 2021-05-18 ENCOUNTER — NURSE TRIAGE (OUTPATIENT)
Dept: OTHER | Facility: CLINIC | Age: 36
End: 2021-05-18

## 2021-05-18 NOTE — TELEPHONE ENCOUNTER
Reason for Disposition   SEVERE abdominal pain (e.g., excruciating)    Answer Assessment - Initial Assessment Questions  1. LOCATION: \"Where does it hurt? \"       L lower abd.    2. RADIATION: \"Does the pain shoot anywhere else? \" (e.g., chest, back)      Lower back pain. 3. ONSET: \"When did the pain begin? \" (e.g., minutes, hours or days ago)       Last week and a half. 4. SUDDEN: \"Gradual or sudden onset? \"  Gradual         5. PATTERN \"Does the pain come and go, or is it constant? \"     - If constant: \"Is it getting better, staying the same, or worsening? \"       (Note: Constant means the pain never goes away completely; most serious pain is constant and it progresses)      - If intermittent: \"How long does it last?\" \"Do you have pain now? \"      (Note: Intermittent means the pain goes away completely between bouts)      Constant since Thursday. 6. SEVERITY: \"How bad is the pain? \"  (e.g., Scale 1-10; mild, moderate, or severe)    - MILD (1-3): doesn't interfere with normal activities, abdomen soft and not tender to touch     - MODERATE (4-7): interferes with normal activities or awakens from sleep, tender to touch     - SEVERE (8-10): excruciating pain, doubled over, unable to do any normal activities   8/10. Pain gets worse with movement. 7. RECURRENT SYMPTOM: \"Have you ever had this type of abdominal pain before? \" If so, ask: \"When was the last time? \" and \"What happened that time? \"       12/21, had a ovarian cyst ruptured. 8. CAUSE: \"What do you think is causing the abdominal pain? \"      Ovarian cyst.    9. RELIEVING/AGGRAVATING FACTORS: \"What makes it better or worse? \" (e.g., movement, antacids, bowel movement)     Movement aggravates the pain. Heating pad helps relieve the pain's severity, but it is still constant. 10. OTHER SYMPTOMS: \"Has there been any vomiting, diarrhea, constipation, or urine problems? \"        Lower back  pain, urinary frequency, diarrhea for the past 2 days.     11. PREGNANCY: \"Is there any chance you are pregnant? \" \"When was your last menstrual period? \"        Denies s/p tubal ligation. Protocols used: ABDOMINAL PAIN - FEMALE-ADULT-[place]    Received call from Karla Paul at [facility]) Zeferino with Red Flag Complaint. Brief description of triage: ABD Pain, see above. Triage indicates for patient to go to the ER now. Care advice provided, patient verbalizes understanding; denies any other questions or concerns; instructed to call back for any new or worsening symptoms. Attention Provider: Thank you for allowing me to participate in the care of your patient. The patient was connected to triage in response to information provided to the ECC. Please do not respond through this encounter as the response is not directed to a shared pool.     Reminders:     Call [address] Provider/Office    Care Advice - both instruction and documentation    Routing - PCP (and NP for 2nd level triage)    PLEASE DELETE ALL RED TEXT PRIOR TO SIGNING NOTE

## 2021-06-03 ENCOUNTER — OFFICE VISIT (OUTPATIENT)
Dept: FAMILY MEDICINE CLINIC | Age: 36
End: 2021-06-03
Payer: COMMERCIAL

## 2021-06-03 ENCOUNTER — TELEPHONE (OUTPATIENT)
Dept: FAMILY MEDICINE CLINIC | Age: 36
End: 2021-06-03

## 2021-06-03 VITALS
SYSTOLIC BLOOD PRESSURE: 128 MMHG | DIASTOLIC BLOOD PRESSURE: 80 MMHG | WEIGHT: 173 LBS | HEART RATE: 91 BPM | BODY MASS INDEX: 30.65 KG/M2 | TEMPERATURE: 97.7 F | OXYGEN SATURATION: 97 %

## 2021-06-03 DIAGNOSIS — R56.9 SEIZURES (HCC): Primary | ICD-10-CM

## 2021-06-03 PROCEDURE — 1036F TOBACCO NON-USER: CPT | Performed by: FAMILY MEDICINE

## 2021-06-03 PROCEDURE — G8427 DOCREV CUR MEDS BY ELIG CLIN: HCPCS | Performed by: FAMILY MEDICINE

## 2021-06-03 PROCEDURE — G8417 CALC BMI ABV UP PARAM F/U: HCPCS | Performed by: FAMILY MEDICINE

## 2021-06-03 PROCEDURE — 99213 OFFICE O/P EST LOW 20 MIN: CPT | Performed by: FAMILY MEDICINE

## 2021-06-03 RX ORDER — LEVETIRACETAM 500 MG/1
1000 TABLET ORAL 2 TIMES DAILY
Qty: 120 TABLET | Refills: 1 | Status: SHIPPED | OUTPATIENT
Start: 2021-06-03 | End: 2022-01-04

## 2021-06-03 SDOH — ECONOMIC STABILITY: HOUSING INSECURITY
IN THE LAST 12 MONTHS, WAS THERE A TIME WHEN YOU DID NOT HAVE A STEADY PLACE TO SLEEP OR SLEPT IN A SHELTER (INCLUDING NOW)?: NO

## 2021-06-03 SDOH — ECONOMIC STABILITY: TRANSPORTATION INSECURITY
IN THE PAST 12 MONTHS, HAS THE LACK OF TRANSPORTATION KEPT YOU FROM MEDICAL APPOINTMENTS OR FROM GETTING MEDICATIONS?: NO

## 2021-06-03 SDOH — ECONOMIC STABILITY: FOOD INSECURITY: WITHIN THE PAST 12 MONTHS, YOU WORRIED THAT YOUR FOOD WOULD RUN OUT BEFORE YOU GOT MONEY TO BUY MORE.: NEVER TRUE

## 2021-06-03 SDOH — ECONOMIC STABILITY: INCOME INSECURITY: IN THE LAST 12 MONTHS, WAS THERE A TIME WHEN YOU WERE NOT ABLE TO PAY THE MORTGAGE OR RENT ON TIME?: NO

## 2021-06-03 SDOH — ECONOMIC STABILITY: FOOD INSECURITY: WITHIN THE PAST 12 MONTHS, THE FOOD YOU BOUGHT JUST DIDN'T LAST AND YOU DIDN'T HAVE MONEY TO GET MORE.: NEVER TRUE

## 2021-06-03 SDOH — ECONOMIC STABILITY: TRANSPORTATION INSECURITY
IN THE PAST 12 MONTHS, HAS LACK OF TRANSPORTATION KEPT YOU FROM MEETINGS, WORK, OR FROM GETTING THINGS NEEDED FOR DAILY LIVING?: NO

## 2021-06-03 ASSESSMENT — ENCOUNTER SYMPTOMS
SHORTNESS OF BREATH: 1
SINUS PRESSURE: 1

## 2021-06-03 ASSESSMENT — SOCIAL DETERMINANTS OF HEALTH (SDOH): HOW HARD IS IT FOR YOU TO PAY FOR THE VERY BASICS LIKE FOOD, HOUSING, MEDICAL CARE, AND HEATING?: NOT HARD AT ALL

## 2021-06-03 NOTE — PROGRESS NOTES
tobacco: Never Used   Vaping Use    Vaping Use: Never used   Substance and Sexual Activity    Alcohol use: No    Drug use: No    Sexual activity: Not Currently   Other Topics Concern    Not on file   Social History Narrative    Not on file     Social Determinants of Health     Financial Resource Strain: Low Risk     Difficulty of Paying Living Expenses: Not hard at all   Food Insecurity: No Food Insecurity    Worried About Running Out of Food in the Last Year: Never true    Ulysses of Food in the Last Year: Never true   Transportation Needs: No Transportation Needs    Lack of Transportation (Medical): No    Lack of Transportation (Non-Medical):  No   Physical Activity:     Days of Exercise per Week:     Minutes of Exercise per Session:    Stress:     Feeling of Stress :    Social Connections:     Frequency of Communication with Friends and Family:     Frequency of Social Gatherings with Friends and Family:     Attends [employer] Services:     Active Member of Clubs or Organizations:     Attends Club or Organization Meetings:     Marital Status:    Intimate Partner Violence:     Fear of Current or Ex-Partner:     Emotionally Abused:     Physically Abused:     Sexually Abused:        Family History   Problem Relation Age of Onset    Other Mother         Hypoglycemia    Other Maternal Grandmother         Thyroid issues     Cancer Maternal Grandmother         Bone cancer     Diabetes Maternal Grandmother     Cancer Maternal Grandfather         Bone cancer     Diabetes Sister        Current Outpatient Medications   Medication Sig Dispense Refill    levETIRAcetam (KEPPRA) 500 MG tablet Take 500 mg by mouth 2 times daily      ibuprofen (ADVIL;MOTRIN) 800 MG tablet Take 1 tablet by mouth daily 120 tablet 0    sertraline (ZOLOFT) 50 MG tablet Take 1 tablet by mouth daily 30 tablet 1    albuterol sulfate HFA (VENTOLIN HFA) 108 (90 Base) MCG/ACT inhaler Inhale 2 puffs into the lungs 4 times daily as needed for Wheezing 1 Inhaler 0     No current facility-administered medications for this visit.        Immunization History   Administered Date(s) Administered    MMR 02/13/2019    Tdap (Boostrix, Adacel) 02/13/2019       Allergies   Allergen Reactions    Dye [Iodides] Other (See Comments)     Low HR    Sulfa Antibiotics Hives, Itching and Nausea Only    Vicodin [Hydrocodone-Acetaminophen] Other (See Comments)     Low HR     Codeine Palpitations    Pcn [Penicillins] Rash       Office Visit on 03/24/2021   Component Date Value Ref Range Status    WBC 03/24/2021 9.7  4.0 - 11.0 K/uL Final    RBC 03/24/2021 4.81  4.00 - 5.20 M/uL Final    Hemoglobin 03/24/2021 14.9  12.0 - 16.0 g/dL Final    Hematocrit 03/24/2021 43.5  36.0 - 48.0 % Final    MCV 03/24/2021 90.4  80.0 - 100.0 fL Final    MCH 03/24/2021 30.9  26.0 - 34.0 pg Final    MCHC 03/24/2021 34.1  31.0 - 36.0 g/dL Final    RDW 03/24/2021 13.2  12.4 - 15.4 % Final    Platelets 46/58/7790 336  135 - 450 K/uL Final    MPV 03/24/2021 7.6  5.0 - 10.5 fL Final    Neutrophils % 03/24/2021 76.2  % Final    Lymphocytes % 03/24/2021 17.5  % Final    Monocytes % 03/24/2021 5.3  % Final    Eosinophils % 03/24/2021 0.5  % Final    Basophils % 03/24/2021 0.5  % Final    Neutrophils Absolute 03/24/2021 7.4  1.7 - 7.7 K/uL Final    Lymphocytes Absolute 03/24/2021 1.7  1.0 - 5.1 K/uL Final    Monocytes Absolute 03/24/2021 0.5  0.0 - 1.3 K/uL Final    Eosinophils Absolute 03/24/2021 0.1  0.0 - 0.6 K/uL Final    Basophils Absolute 03/24/2021 0.1  0.0 - 0.2 K/uL Final    Sodium 03/24/2021 139  136 - 145 mmol/L Final    Potassium 03/24/2021 4.4  3.5 - 5.1 mmol/L Final    Chloride 03/24/2021 103  99 - 110 mmol/L Final    CO2 03/24/2021 23  21 - 32 mmol/L Final    Anion Gap 03/24/2021 13  3 - 16 Final    Glucose 03/24/2021 78  70 - 99 mg/dL Final    BUN 03/24/2021 21* 7 - 20 mg/dL Final    CREATININE 03/24/2021 0.8  0.6 - 1.1 mg/dL Final    GFR Non- 03/24/2021 >60  >60 Final    Comment: >60 mL/min/1.73m2 EGFR, calc. for ages 25 and older using the  MDRD formula (not corrected for weight), is valid for stable  renal function.  GFR  03/24/2021 >60  >60 Final    Comment: Chronic Kidney Disease: less than 60 ml/min/1.73 sq.m. Kidney Failure: less than 15 ml/min/1.73 sq.m. Results valid for patients 18 years and older.  Calcium 03/24/2021 11.0* 8.3 - 10.6 mg/dL Final    Total Protein 03/24/2021 7.9  6.4 - 8.2 g/dL Final    Albumin 03/24/2021 4.9  3.4 - 5.0 g/dL Final    Albumin/Globulin Ratio 03/24/2021 1.6  1.1 - 2.2 Final    Total Bilirubin 03/24/2021 0.9  0.0 - 1.0 mg/dL Final    Alkaline Phosphatase 03/24/2021 80  40 - 129 U/L Final    ALT 03/24/2021 20  10 - 40 U/L Final    AST 03/24/2021 19  15 - 37 U/L Final    Globulin 03/24/2021 3.0  g/dL Final    TSH 03/24/2021 1.15  0.27 - 4.20 uIU/mL Final       Review of Systems   HENT: Positive for sinus pressure. Respiratory: Positive for shortness of breath. Neurological: Positive for seizures and light-headedness (when wearing her mask). Frontal lobe swelling       /80 (Site: Left Upper Arm, Position: Sitting)   Pulse 91   Temp 97.7 °F (36.5 °C) (Temporal)   Wt 173 lb (78.5 kg)   LMP 05/29/2021   SpO2 97%   Breastfeeding No   BMI 30.65 kg/m²     Physical Exam  Vitals reviewed. Constitutional:       Appearance: She is well-developed. HENT:      Head: Normocephalic and atraumatic. Eyes:      Pupils: Pupils are equal, round, and reactive to light. Cardiovascular:      Rate and Rhythm: Normal rate and regular rhythm. Heart sounds: Normal heart sounds. No murmur heard. Pulmonary:      Effort: Pulmonary effort is normal.      Breath sounds: Normal breath sounds. No wheezing. Abdominal:      General: Bowel sounds are normal.      Tenderness: There is no abdominal tenderness.    Musculoskeletal:      Cervical back: Normal range of motion. Neurological:      Mental Status: She is alert and oriented to person, place, and time. Psychiatric:         Behavior: Behavior normal.         Thought Content: Thought content normal.         Judgment: Judgment normal.         Plan   Diagnosis Orders   1. Seizures [facility])  Khurram Luna MD, Neurology, [facility]  Increase Keppra       Return in about 1 month (around 7/3/2021) for Neurology Follow-up. Prior to Visit Medications    Medication Sig Taking?  Authorizing Provider   levETIRAcetam (KEPPRA) 500 MG tablet Take 500 mg by mouth 2 times daily Yes Historical Provider, MD   ibuprofen (ADVIL;MOTRIN) 800 MG tablet Take 1 tablet by mouth daily Yes Macie Bhatt,    sertraline (ZOLOFT) 50 MG tablet Take 1 tablet by mouth daily Yes Macie Bhatt DO   albuterol sulfate HFA (VENTOLIN HFA) 108 (90 Base) MCG/ACT inhaler Inhale 2 puffs into the lungs 4 times daily as needed for Wheezing Yes Macie Bhatt, DO

## 2021-07-29 ENCOUNTER — NURSE TRIAGE (OUTPATIENT)
Dept: OTHER | Facility: CLINIC | Age: 36
End: 2021-07-29

## 2021-07-29 NOTE — TELEPHONE ENCOUNTER
Reason for Disposition   SEVERE or constant chest pain or pressure (Exception: mild central chest pain, present only when coughing)    Answer Assessment - Initial Assessment Questions  1. COVID-19 DIAGNOSIS: \"Who made your Coronavirus (COVID-19) diagnosis? \" \"Was it confirmed by a positive lab test?\" If not diagnosed by a HCP, ask \"Are there lots of cases (community spread) where you live? \" (See [facility] health department website, if unsure)      Tested + covid on Monday . She got back home from [facility] on Saturday. 2. COVID-19 EXPOSURE: \"Was there any known exposure to COVID before the symptoms began? \" CDC Definition of close contact: within 6 feet (2 meters) for a total of 15 minutes or more over a 24-hour period. Her aunt was positive    3. ONSET: \"When did the COVID-19 symptoms start? \"       This past Sunday    4. WORST SYMPTOM: \"What is your worst symptom? \" (e.g., cough, fever, shortness of breath, muscle aches)      Body aches, but also breathing in and out gives pain in chest (this is new as of yesterday )     5. COUGH: \"Do you have a cough? \" If Yes, ask: \"How bad is the cough? \"        Coughing is \"pretty regular\"    6. FEVER: \"Do you have a fever? \" If Yes, ask: \"What is your temperature, how was it measured, and when did it start? \"      During day low grade, then higher at night     7. RESPIRATORY STATUS: \"Describe your breathing? \" (e.g., shortness of breath, wheezing, unable to speak)       See above    8. BETTER-SAME-WORSE: Jane Mehta you getting better, staying the same or getting worse compared to yesterday? \"  If getting worse, ask, \"In what way? \"      Worse    9. HIGH RISK DISEASE: \"Do you have any chronic medical problems? \" (e.g., asthma, heart or lung disease, weak immune system, obesity, etc.)      Asthma, lung issues d/t prematurity     10. PREGNANCY: \"Is there any chance you are pregnant? \" \"When was your last menstrual period? \"        No    11. OTHER SYMPTOMS: \"Do you have any other symptoms? \" (e.g., chills, fatigue, headache, loss of smell or taste, muscle pain, sore throat; new loss of smell or taste especially support the diagnosis of COVID-19)        See travel screening for all sx. She also c/o a constant chest pain rated 8/10. Pt states she is not vaccinated. Protocols used: CORONAVIRUS (FHEUU-53) DIAGNOSED OR SUSPECTED-ADULT-[place]    Received call from [place] [facility] [place] [facility] with Red Flag Complaint. Brief description of triage: see above    Triage indicates for patient to go to ER - pt agreeable - informed pt to wear mask and tell first staff member at desk of diagnosis and symptom of chest pain - pt verbalizes understanding . Care advice provided, patient verbalizes understanding; denies any other questions or concerns; instructed to call back for any new or worsening symptoms. Attention Provider: Thank you for allowing me to participate in the care of your patient. The patient was connected to triage in response to information provided to the E[facility]. Please do not respond through this encounter as the response is not directed to a shared pool.

## 2021-08-03 ENCOUNTER — NURSE TRIAGE (OUTPATIENT)
Dept: OTHER | Facility: CLINIC | Age: 36
End: 2021-08-03

## 2021-08-03 ENCOUNTER — APPOINTMENT (OUTPATIENT)
Dept: GENERAL RADIOLOGY | Age: 36
End: 2021-08-03
Payer: COMMERCIAL

## 2021-08-03 ENCOUNTER — HOSPITAL ENCOUNTER (EMERGENCY)
Age: 36
Discharge: HOME | End: 2021-08-03
Attending: STUDENT IN AN ORGANIZED HEALTH CARE EDUCATION/TRAINING PROGRAM
Payer: COMMERCIAL

## 2021-08-03 VITALS
OXYGEN SATURATION: 97 % | HEART RATE: 86 BPM | RESPIRATION RATE: 12 BRPM | DIASTOLIC BLOOD PRESSURE: 97 MMHG | SYSTOLIC BLOOD PRESSURE: 130 MMHG | TEMPERATURE: 98.2 F

## 2021-08-03 DIAGNOSIS — U07.1 PNEUMONIA DUE TO COVID-19 VIRUS: Primary | ICD-10-CM

## 2021-08-03 DIAGNOSIS — J12.82 PNEUMONIA DUE TO COVID-19 VIRUS: Primary | ICD-10-CM

## 2021-08-03 LAB
A/G RATIO: 1.3 (ref 1.1–2.2)
ALBUMIN SERPL-MCNC: 4.5 G/DL (ref 3.4–5)
ALP BLD-CCNC: 92 U/L (ref 40–129)
ALT SERPL-CCNC: 42 U/L (ref 10–40)
ANION GAP SERPL CALCULATED.3IONS-SCNC: 12 MMOL/L (ref 3–16)
AST SERPL-CCNC: 51 U/L (ref 15–37)
BASOPHILS ABSOLUTE: 0 K/UL (ref 0–0.2)
BASOPHILS RELATIVE PERCENT: 0.2 %
BILIRUB SERPL-MCNC: 1 MG/DL (ref 0–1)
BUN BLDV-MCNC: 16 MG/DL (ref 7–20)
CALCIUM SERPL-MCNC: 9.9 MG/DL (ref 8.3–10.6)
CHLORIDE BLD-SCNC: 102 MMOL/L (ref 99–110)
CO2: 27 MMOL/L (ref 21–32)
CREAT SERPL-MCNC: 0.7 MG/DL (ref 0.6–1.1)
EOSINOPHILS ABSOLUTE: 0 K/UL (ref 0–0.6)
EOSINOPHILS RELATIVE PERCENT: 0.3 %
GFR AFRICAN AMERICAN: >60
GFR NON-AFRICAN AMERICAN: >60
GLOBULIN: 3.6 G/DL
GLUCOSE BLD-MCNC: 101 MG/DL (ref 70–99)
HCG QUALITATIVE: NEGATIVE
HCT VFR BLD CALC: 46 % (ref 36–48)
HEMOGLOBIN: 15.9 G/DL (ref 12–16)
INFLUENZA A: NOT DETECTED
INFLUENZA B: NOT DETECTED
LACTIC ACID: 0.9 MMOL/L (ref 0.4–2)
LYMPHOCYTES ABSOLUTE: 1.5 K/UL (ref 1–5.1)
LYMPHOCYTES RELATIVE PERCENT: 32.9 %
MCH RBC QN AUTO: 30.7 PG (ref 26–34)
MCHC RBC AUTO-ENTMCNC: 34.5 G/DL (ref 31–36)
MCV RBC AUTO: 88.8 FL (ref 80–100)
MONOCYTES ABSOLUTE: 0.3 K/UL (ref 0–1.3)
MONOCYTES RELATIVE PERCENT: 6.1 %
NEUTROPHILS ABSOLUTE: 2.7 K/UL (ref 1.7–7.7)
NEUTROPHILS RELATIVE PERCENT: 60.5 %
PDW BLD-RTO: 13 % (ref 12.4–15.4)
PLATELET # BLD: 223 K/UL (ref 135–450)
PMV BLD AUTO: 7.6 FL (ref 5–10.5)
POTASSIUM REFLEX MAGNESIUM: 4 MMOL/L (ref 3.5–5.1)
RBC # BLD: 5.17 M/UL (ref 4–5.2)
SARS-COV-2 RNA, RT PCR: DETECTED
SODIUM BLD-SCNC: 141 MMOL/L (ref 136–145)
TOTAL PROTEIN: 8.1 G/DL (ref 6.4–8.2)
TROPONIN: <0.01 NG/ML
WBC # BLD: 4.5 K/UL (ref 4–11)

## 2021-08-03 PROCEDURE — 84703 CHORIONIC GONADOTROPIN ASSAY: CPT

## 2021-08-03 PROCEDURE — 80053 COMPREHEN METABOLIC PANEL: CPT

## 2021-08-03 PROCEDURE — 85025 COMPLETE CBC W/AUTO DIFF WBC: CPT

## 2021-08-03 PROCEDURE — 71045 X-RAY EXAM CHEST 1 VIEW: CPT

## 2021-08-03 PROCEDURE — 87636 SARSCOV2 & INF A&B AMP PRB: CPT

## 2021-08-03 PROCEDURE — 93005 ELECTROCARDIOGRAM TRACING: CPT | Performed by: PHYSICIAN ASSISTANT

## 2021-08-03 PROCEDURE — 84484 ASSAY OF TROPONIN QUANT: CPT

## 2021-08-03 PROCEDURE — 99283 EMERGENCY DEPT VISIT LOW MDM: CPT

## 2021-08-03 PROCEDURE — 83605 ASSAY OF LACTIC ACID: CPT

## 2021-08-03 RX ORDER — DEXTROMETHORPHAN HYDROBROMIDE, GUAIFENESIN AND PSEUDOEPHEDRINE HYDROCHLORIDE 15; 400; 60 MG/1; MG/1; MG/1
1 TABLET ORAL 2 TIMES DAILY PRN
Qty: 10 TABLET | Refills: 0 | Status: SHIPPED | OUTPATIENT
Start: 2021-08-03 | End: 2021-08-08

## 2021-08-03 ASSESSMENT — PAIN DESCRIPTION - LOCATION
LOCATION: CHEST
LOCATION: CHEST

## 2021-08-03 ASSESSMENT — PAIN SCALES - GENERAL
PAINLEVEL_OUTOF10: 8
PAINLEVEL_OUTOF10: 9

## 2021-08-03 ASSESSMENT — PAIN DESCRIPTION - FREQUENCY: FREQUENCY: CONTINUOUS

## 2021-08-03 ASSESSMENT — PAIN DESCRIPTION - ORIENTATION: ORIENTATION: MID

## 2021-08-03 ASSESSMENT — PAIN DESCRIPTION - DESCRIPTORS: DESCRIPTORS: TIGHTNESS

## 2021-08-03 NOTE — TELEPHONE ENCOUNTER
Received call from [facility] with Red Flag Complaint. Brief description of triage: Pt reports being diagnosed with covid 7/29/21. States she has worsening SOB at rest and worse after coughing. Triage indicates for patient to go to ER. PT declines ER. States she was seen there on 7/29/21 and waited for over 1 hour to be seen. Requesting to speak with PCP to have chest xray ordered. TC to [facility], spoke to Dr. Tiffany Campuzano. States he agrees that pt should go to ER. States the ER could order xray, possible fluids, meds to treat SOB. Asked this RN to contact ER to alert pt would be coming. Explained this information to the pt. Pt agreeable to going to ER per PCP request. States her sister can drive her. TC to [facility] ER. Spoke to  to alert that Dr. Tiffany Campuzano had recommended she be seen at the ER. Care advice provided, patient verbalizes understanding; denies any other questions or concerns; instructed to call back for any new or worsening symptoms. Attention Provider: Thank you for allowing me to participate in the care of your patient. The patient was connected to triage in response to information provided to the [facility]. Please do not respond through this encounter as the response is not directed to a shared pool. Reason for Disposition   MODERATE difficulty breathing (e.g., speaks in phrases, SOB even at rest, pulse 100-120) of new onset or worse than normal    Answer Assessment - Initial Assessment Questions  1. RESPIRATORY STATUS: \"Describe your breathing? \" (e.g., wheezing, shortness of breath, unable to speak, severe coughing)       SOB at rest -worse with coughing    2. ONSET: \"When did this breathing problem begin? \"       2-3 days ago. 3. PATTERN \"Does the difficult breathing come and go, or has it been constant since it started? \"       Intermittent    4. SEVERITY: \"How bad is your breathing? \" (e.g., mild, moderate, severe)     - MILD: No SOB at rest, mild SOB with walking, speaks normally in sentences, can lay down, no retractions, pulse < 100.     - MODERATE: SOB at rest, SOB with minimal exertion and prefers to sit, cannot lie down flat, speaks in phrases, mild retractions, audible wheezing, pulse 100-120.     - SEVERE: Very SOB at rest, speaks in single words, struggling to breathe, sitting hunched forward, retractions, pulse > 120       Moderate    5. RECURRENT SYMPTOM: \"Have you had difficulty breathing before? \" If so, ask: \"When was the last time? \" and \"What happened that time? \"       Diagnosed with covid on 7/29/21    6. CARDIAC HISTORY: \"Do you have any history of heart disease? \" (e.g., heart attack, angina, bypass surgery, angioplasty)       Denies    7. LUNG HISTORY: \"Do you have any history of lung disease? 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 CAUSE: \"What do you think is causing the breathing problem? \"       Covid    9. OTHER SYMPTOMS: \"Do you have any other symptoms? (e.g., dizziness, runny nose, cough, chest pain, fever)      Cough, fever    10. PREGNANCY: \"Is there any chance you are pregnant? \" \"When was your last menstrual period? \"        LMP 7/20/21 and started again 8/2/21    11. TRAVEL: \"Have you traveled out of the country in the last month? \" (e.g., travel history, exposures)        [place], [place]  7/17/21-7/24/21    Protocols used: BREATHING DIFFICULTY-ADULT-[place]

## 2021-08-03 NOTE — ED NOTES
Pt presents to ED today with cc of increased work of breathing especially when going up and down the steps. Also endorses nausea and vomiting. Pt states that every time she eats she gets sick. Pt placed on cardiac monitor.      Stewart Marshall RN  08/03/21 [id number]

## 2021-08-03 NOTE — ED PROVIDER NOTES
The Ekg interpreted by me shows  normal sinus rhythm with a rate of 80  Axis is   leftward  QTc is  normal  Intervals and Durations are unremarkable. ST Segments: nonspecific changes    I had accidentally assigned myself to patient's case that was not part of patient's work-up. I did not perform history or physical exam as patient. Patient was seen by another provider.      Frantz Rosa, DO  08/03/21 1401 [facility],   08/03/21 4858

## 2021-08-04 ENCOUNTER — CARE COORDINATION (OUTPATIENT)
Dept: CARE COORDINATION | Age: 36
End: 2021-08-04

## 2021-08-04 ENCOUNTER — TELEPHONE (OUTPATIENT)
Dept: FAMILY MEDICINE CLINIC | Age: 36
End: 2021-08-04

## 2021-08-04 LAB
EKG ATRIAL RATE: 80 BPM
EKG DIAGNOSIS: NORMAL
EKG P AXIS: 42 DEGREES
EKG P-R INTERVAL: 152 MS
EKG Q-T INTERVAL: 360 MS
EKG QRS DURATION: 72 MS
EKG QTC CALCULATION (BAZETT): 415 MS
EKG R AXIS: -2 DEGREES
EKG T AXIS: -3 DEGREES
EKG VENTRICULAR RATE: 80 BPM

## 2021-08-04 PROCEDURE — 93010 ELECTROCARDIOGRAM REPORT: CPT | Performed by: INTERNAL MEDICINE

## 2021-08-04 NOTE — CARE COORDINATION
First attempt to reach the pt by the RN, ESTHER. The RN, Ambulatory Care Manager called and left a message with the nurse's call back number asking the pt to return the nurse's call.

## 2021-08-04 NOTE — TELEPHONE ENCOUNTER
----- Message from Max Devries sent at 8/3/2021  5:58 PM EDT -----  Subject: Message to Provider    QUESTIONS  Information for Provider? pt stated she was told to go to the hospital due   to having trouble breathing. she went to [facility] 8/3. she   has covid plus pneumonia and they said she needs to be on oxygen but they   would not give her oxygen. she wanted to let her PCP know.   ---------------------------------------------------------------------------  --------------  CALL BACK INFO  What is the best way for the office to contact you? OK to leave message on   voicemail  Preferred Call Back Phone Number? [phone]  ---------------------------------------------------------------------------  --------------  SCRIPT ANSWERS  Relationship to Patient?  Self

## 2021-08-05 ENCOUNTER — CARE COORDINATION (OUTPATIENT)
Dept: CARE COORDINATION | Age: 36
End: 2021-08-05

## 2021-08-05 NOTE — CARE COORDINATION
Second attempt to reach the pt by the RN, ESTHER. The RN, Ambulatory Care Manager called and left a message with the nurse's call back number asking the pt to return the nurse's call.  SpO2 in ED on 8/3/21 appears to have been %

## 2021-08-06 ENCOUNTER — TELEPHONE (OUTPATIENT)
Dept: FAMILY MEDICINE CLINIC | Age: 36
End: 2021-08-06

## 2021-08-06 NOTE — TELEPHONE ENCOUNTER
Pt's mother Jeremy Melvin called back. I advised that she is not on HIPAA, so I can not speak with her. Advised that I understand she wants to help her daughter, but I can not legally talk to her.

## 2021-08-06 NOTE — TELEPHONE ENCOUNTER
----- Message from Jumana Driververonica sent at 8/5/2021  6:59 PM EDT -----  Subject: Message to Provider    QUESTIONS  Information for Provider? pt has covid & pneumonia wanted to see if she   could get medication sent in for her such as antibiotics and steriods to   help clear her lungs, and cough medicine mentioned a z-pac. she went to   two different hospitals and all they prescribed her was cough/cold   medicine. its not helping. she cant barely talk without talking. please   contact her mother if can since pt cant talk alot. [facility] [address]   mother is Galileo Gaitan #[phone]   ---------------------------------------------------------------------------  --------------  Gene Janell INFO  What is the best way for the office to contact you? OK to leave message on   voicemail  Preferred Call Back Phone Number? [phone]  ---------------------------------------------------------------------------  --------------  SCRIPT ANSWERS  Relationship to Patient?  Self

## 2021-08-06 NOTE — TELEPHONE ENCOUNTER
Please have pt schedule a VV so that I can take a history. I have few opening this afternoon. Thank you.

## 2021-08-10 NOTE — ED PROVIDER NOTES
[address] ED  EMERGENCY DEPARTMENT ENCOUNTER        Pt Name: Dania Abreu  MRN: [id number]  [place] 1985   Date of evaluation: 8/3/2021  Provider: BANDAR Davies  PCP: Radha Arroyo DO    This patient was not seen and evaluated by the attending physician No att. providers found. I have evaluated this patient. My supervising physician was available for consultation. CHIEF COMPLAINT       Chief Complaint   Patient presents with    Shortness of Breath     tested +covid on 7-25 for covid c/o worse sob       HISTORY OF PRESENT ILLNESS   (Location/Symptom, Timing/Onset, Context/Setting, Quality, Duration, Modifying Factors, Severity)  Note limiting factors. Dania Abreu is a 39 y.o. female who presents via private vehicle from her home for evaluation of shortness of breath. ED Course as of Aug 10 0211   Tue Aug 03, 2021   1621 Unvaccinated. Went to [facility]. She tested + on 7/24. She notes sob with traversing stairs. She hAD FEVER for 7 days no more fever. She has been having nausea and emesius. Decreased appetite. Headaches. She notes a chest heaviess. She notes cough. She denies pleuritic chest pain. No syncope. She has been sleeping more. She is not on OCP no extra hormone use. She has never had a blood clot. She has nbeen taking motrin and resting. She was seen at an ER on Wednesday for fevers. 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     [CS]      ED Course User Index  [CS] Vinicius Davies       Nursing Notes were all reviewed and agreed with or any disagreements were addressed  in the HPI. Pt was seen during the [place] 19 pandemic. Appropriate PPE worn by ME during patient encounters. Pt seen during a time with constrained hospital bed capacity and other potential inpatient and outpatient resources were constrained due to the viral pandemic.      REVIEW OF SYSTEMS    (2-9 systems for level 4, 10 or more for level 5)     Review of Systems    Positives and Pertinent negatives as per HPI. Except as noted abovein the ROS, all other systems were reviewed and negative. 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 Take 2 tablets in the morning and 1 tablet in the evening for the first week, then 2 tablets twice a day afterwords. , Disp-120 tablet, R-1Normal      ibuprofen (ADVIL;MOTRIN) 800 MG tablet Take 1 tablet by mouth daily, Disp-120 tablet, R-0Normal      sertraline (ZOLOFT) 50 MG tablet Take 1 tablet by mouth daily, Disp-30 tablet, R-1Normal      albuterol sulfate HFA (VENTOLIN HFA) 108 (90 Base) MCG/ACT inhaler Inhale 2 puffs into the lungs 4 times daily as needed for Wheezing, Disp-1 Inhaler, R-0Normal               ALLERGIES     Dye [iodides], Sulfa antibiotics, Vicodin [hydrocodone-acetaminophen], Codeine, and Pcn [penicillins]    FAMILYHISTORY       Family History   Problem Relation Age of Onset    Other Mother         Hypoglycemia    Other Maternal Grandmother         Thyroid issues     Cancer Maternal Grandmother         Bone cancer     Diabetes Maternal Grandmother     Cancer Maternal Grandfather         Bone cancer     Diabetes Sister           SOCIAL HISTORY       Social History     Socioeconomic History    Marital status:      Spouse name: None    Number of children: None    Years of education: None    Highest education level: None PHYSICAL EXAM    (up to 7 for level 4, 8 or more for level 5)     ED Triage Vitals [08/03/21 1538]   BP Temp Temp Source Pulse Resp SpO2 Height Weight   124/86 98.8 °F (37.1 °C) Oral 81 12 100 % -- --       Physical Exam  Vitals and nursing note reviewed. Constitutional:       General: She is awake. She is not in acute distress. Appearance: She is well-developed. She is not ill-appearing, toxic-appearing or diaphoretic. HENT:      Head: Normocephalic and atraumatic. Right Ear: External ear normal.      Left Ear: External ear normal.      Nose: Congestion present. No rhinorrhea. Mouth/Throat:      Mouth: Mucous membranes are moist.      Pharynx: Oropharynx is clear. Eyes:      General:         Right eye: No discharge. Left eye: No discharge. Extraocular Movements: Extraocular movements intact. Conjunctiva/sclera: Conjunctivae normal.      Pupils: Pupils are equal, round, and reactive to light. Cardiovascular:      Rate and Rhythm: Normal rate and regular rhythm. Pulses:           Radial pulses are 2+ on the right side and 2+ on the left side. Posterior tibial pulses are 2+ on the right side and 2+ on the left side. Heart sounds: Normal heart sounds. No murmur heard. No friction rub. No gallop. Comments: No LE redness, swelling, warmth, tenderness, asymmetry or superficial venous changes. Pulmonary:      Effort: Pulmonary effort is normal. No tachypnea, bradypnea, accessory muscle usage, prolonged expiration, respiratory distress or retractions. Breath sounds: Normal air entry. Rhonchi present. No wheezing or rales. Abdominal:      Palpations: Abdomen is soft. Tenderness: There is no abdominal tenderness. There is no guarding. Musculoskeletal:      Cervical back: Normal range of motion and neck supple. No rigidity. Right lower leg: No edema. Left lower leg: No edema.    Lymphadenopathy:      Cervical: No cervical adenopathy. Skin:     General: Skin is warm and dry. Capillary Refill: Capillary refill takes less than 2 seconds. Findings: No rash. Neurological:      General: No focal deficit present. Mental Status: She is alert, oriented to person, place, and time and easily aroused. Sensory: No sensory deficit. Motor: No weakness. Gait: Gait normal.   Psychiatric:         Mood and Affect: Mood normal.         Behavior: Behavior normal. Behavior is cooperative.          DIAGNOSTIC RESULTS   LABS:    Labs Reviewed   COVID-19 & INFLUENZA COMBO - Abnormal; Notable for the following components:       Result Value    SARS-CoV-2 RNA, RT PCR DETECTED (*)     All other components within normal limits    Narrative:     Juanito Mckeon  [facility] tel. [phone],  Microbiology results called to and read back by Prudence to relay to RN,  08/03/2021 16:45, by [address] [employer]  Performed at:  [facility],  [place]   Phone [phone]   COMPREHENSIVE METABOLIC PANEL W/ REFLEX TO MG FOR LOW K - Abnormal; Notable for the following components:    Glucose 101 (*)     ALT 42 (*)     AST 51 (*)     All other components within normal limits    Narrative:     Performed at:  [facility],  [place] [employer]   Phone [phone]   CBC WITH AUTO DIFFERENTIAL    Narrative:     Performed at:  [facility],  [place], [place]   Phone [phone]   TROPONIN    Narrative:     Performed at:  [place] [address],  [place], [employer]   Phone [phone]   HCG, SERUM, QUALITATIVE    Narrative:     Performed at:  [facility],  [place] [employer] [place], [place]   Phone [phone]   LACTIC ACID, PLASMA    Narrative:     Performed at:  [facility] [address] [facility],  [place], [facility]   Phone [phone]       All other labs were within normal range or not returned as of this dictation. EKG: All EKG's are interpreted by the Emergency Department Physician who either signs orCo-signs this chart in the absence of a cardiologist.  Please see their note for interpretation of EKG. RADIOLOGY:   Non-plain film images such as CT, Ultrasound and MRI are read by the radiologist. Plain radiographic images are visualized andpreliminarily interpreted by the  ED Provider with the below findings:        Interpretation [facility] Radiologist below, if available at the time of this note:    XR CHEST PORTABLE   Final Result   Multifocal airspace disease, given history of COVID, this is consistent with   COVID pneumonia. No results found. PROCEDURES   Unless otherwise noted below, none     Procedures    CRITICAL CARE TIME   N/A    CONSULTS:  None      EMERGENCY DEPARTMENT COURSE and DIFFERENTIALDIAGNOSIS/MDM:   Vitals:    Vitals:    08/03/21 1538 08/03/21 1722 08/03/21 1730   BP: 124/86 113/73 (!) 130/97   Pulse: 81 84 86   Resp: 12     Temp: 98.8 °F (37.1 °C) 98.2 °F (36.8 °C)    TempSrc: Oral     SpO2: 100% 96% 97%       Patient was given thefollowing medications:  Medications - No data to display    PDMP Monitoring:    Last PDMP Arnaldo as Reviewed [facility]):  Review User Review Instant Review Result            Urine Drug Screenings (1 yr)    No resulted procedures found. Medication Contract and Consent for Opioid Use Documents Filed      No documents found                MDM:   Patient seen and evaluated. Old records reviewed. Diagnostic testing reviewed and results discussed. I have independently evaluated this patient based upon my scope of practice. Supervising physician was in the department for consultation as needed. Patient is a 42-year-old female who presents for evaluation of shortness of breath in context of known COVID-19 infection.   On exam she is alert she is oriented she is afebrile she is well-perfused she is hemodynamically stable nontoxic in appearance. Patient has slight rhonchorous breath sounds on auscultation bilaterally but she is in no acute respiratory distress, she was road tested and she did not become hypoxic or tachycardic with ambulation. She otherwise appears well, no significant dehydration, abdomen is soft nontender without peritoneal signs and she has no focal neuro deficits. Work-up in the emergency department included labs and imaging, overall I have very low concern for PE given her reassuring vital signs and no sign of DVT. CBC normal.  CMP without focal abnormality. Troponin is negative I have low concern for acute myocarditis. Lactic is within normal limits I have low concern for sepsis. hCG is negative. Chest x-ray reveals hazy patchy opacities noted bilaterally to mid to lower lungs reflecting multifocal airspace disease without associated pleural effusions or appreciable pneumothorax. EKG reveals normal sinus rhythm, no significant sign of ischemia. At this time I believe patient's pneumonia is related to viral Covid pneumonia and I do not believe antibiotics are warranted at this time. She is not hypoxic I do not believe steroids are warranted at this time. I advised that she continue with symptomatic care, she notes severe fatigue will start her on Capmist DM over-the-counter medication which include pseudoephedrine dextromethorphan and guaifenesin. Strict ER return precautions advised. I will ensure that patient has a pulse oximeter for use at home. The patient was counseled to closely monitor their symptoms, and to return immediately to the Emergency Department for any difficulty breathing, confusion, dizziness or passing out, vomiting, chest pain, high fevers, weakness, or any other new or concerning symptoms.   There is no evidence of emergent medical condition at this time, and the patient will be discharged in good condition. The patient is low risk for mortality from covid-19 based on demographic, history and clinical factors. Given the best available information and clinical assessment, I estimate the risk of hospitalization to be greater than the risk of treatment at home. I have explained to the patient that the risk could rapidly change, given precautions for return and instructions on how to minimize being contagious. Pt instructed to follow up for new/worsening symptoms such as SOB, lightheadedness, syncope, chest pain, hemoptysis, worsening fever, or SpO2<90%     At this time I have low concern for ARDS, septicemia, PE. Pt is in agreement with the current plan and all questions were addressed. Discharge Time out:  CC Reviewed Yes   Test Results Yes     Vitals:    08/03/21 1730   BP: (!) 130/97   Pulse: 86   Resp:    Temp:    SpO2: 97%              FINAL IMPRESSION      1.  Pneumonia due to COVID-19 virus          DISPOSITION/PLAN   DISPOSITION Decision To Discharge 08/03/2021 05:31:27 PM      PATIENT REFERREDTO:  Todd Jaramillo DO  [address]    Schedule an appointment as soon as possible for a visit   If symptoms worsen    [facility] ED  [address] [address]  [phone]  Go to   If symptoms worsen      DISCHARGE MEDICATIONS:  Discharge Medication List as of 8/3/2021  5:37 PM      START taking these medications    Details   Pseudoephedrine-DM-GG (CAPMIST DM) 60- MG TABS Take 1 tablet by mouth 2 times daily as needed (congestion, cough), Disp-10 tablet, R-0Normal             DISCONTINUED MEDICATIONS:  Discharge Medication List as of 8/3/2021  5:37 PM                 (Please note that portions ofthis note were completed with a voice recognition program.  Efforts were made to edit the dictations but occasionally words are mis-transcribed.)    Vinicius Alvarez (electronically signed)       Jabier Torres

## 2021-08-17 NOTE — TELEPHONE ENCOUNTER
Per Dr Rocio Hoang on 8/6 the patient needs to schedule a VV. Her mother called 2 x on was told we can not speak with her because of HIPAA.

## 2021-08-23 ENCOUNTER — NURSE TRIAGE (OUTPATIENT)
Dept: OTHER | Facility: CLINIC | Age: 36
End: 2021-08-23

## 2021-08-23 ENCOUNTER — OFFICE VISIT (OUTPATIENT)
Dept: FAMILY MEDICINE CLINIC | Age: 36
End: 2021-08-23
Payer: COMMERCIAL

## 2021-08-23 VITALS
OXYGEN SATURATION: 98 % | SYSTOLIC BLOOD PRESSURE: 128 MMHG | TEMPERATURE: 99 F | RESPIRATION RATE: 16 BRPM | BODY MASS INDEX: 30.82 KG/M2 | HEART RATE: 67 BPM | DIASTOLIC BLOOD PRESSURE: 80 MMHG | WEIGHT: 174 LBS

## 2021-08-23 DIAGNOSIS — J45.50 SEVERE PERSISTENT ASTHMA, UNSPECIFIED WHETHER COMPLICATED: ICD-10-CM

## 2021-08-23 DIAGNOSIS — R00.1 BRADYCARDIA: ICD-10-CM

## 2021-08-23 DIAGNOSIS — J12.82 PNEUMONIA DUE TO COVID-19 VIRUS: ICD-10-CM

## 2021-08-23 DIAGNOSIS — U07.1 PNEUMONIA DUE TO COVID-19 VIRUS: ICD-10-CM

## 2021-08-23 DIAGNOSIS — R06.02 SHORTNESS OF BREATH: Primary | ICD-10-CM

## 2021-08-23 PROCEDURE — G8417 CALC BMI ABV UP PARAM F/U: HCPCS | Performed by: FAMILY MEDICINE

## 2021-08-23 PROCEDURE — 1036F TOBACCO NON-USER: CPT | Performed by: FAMILY MEDICINE

## 2021-08-23 PROCEDURE — 99213 OFFICE O/P EST LOW 20 MIN: CPT | Performed by: FAMILY MEDICINE

## 2021-08-23 PROCEDURE — G8427 DOCREV CUR MEDS BY ELIG CLIN: HCPCS | Performed by: FAMILY MEDICINE

## 2021-08-23 ASSESSMENT — PATIENT HEALTH QUESTIONNAIRE - PHQ9
SUM OF ALL RESPONSES TO PHQ QUESTIONS 1-9: 0
2. FEELING DOWN, DEPRESSED OR HOPELESS: 0
SUM OF ALL RESPONSES TO PHQ9 QUESTIONS 1 & 2: 0
1. LITTLE INTEREST OR PLEASURE IN DOING THINGS: 0
SUM OF ALL RESPONSES TO PHQ QUESTIONS 1-9: 0
SUM OF ALL RESPONSES TO PHQ QUESTIONS 1-9: 0

## 2021-08-23 ASSESSMENT — ENCOUNTER SYMPTOMS
SHORTNESS OF BREATH: 1
WHEEZING: 1

## 2021-08-23 NOTE — TELEPHONE ENCOUNTER
Received call from [address] [place] [address] at [facility] with Red Flag Complaint. Brief description of triage: shortness of breath with activity, used her inhaler a lot yesterday, her watch tells her that her heart rate is 43 at night, went to [facility] on Friday and was told she has a low heart rate. Feels the same as at [facility] on Friday. Feels she might need a 24 hour holter monitor. Triage not needed. Care advice provided, patient verbalizes understanding; denies any other questions or concerns; instructed to call back for any new or worsening symptoms. Writer provided warm transfer to [facility] at [facility] for appointment scheduling. Attention Provider: Thank you for allowing me to participate in the care of your patient. The patient was connected to triage in response to information provided to the [facility]. Please do not respond through this encounter as the response is not directed to a shared pool.             Reason for Disposition   Caller has already spoken with the PCP (or office), and has no further questions    Protocols used: NO CONTACT OR DUPLICATE CONTACT CALL-ADULT-[place]

## 2021-08-23 NOTE — PROGRESS NOTES
8/23/2021    This is a 39 y.o. female   Chief Complaint   Patient presents with    Heart Problem     heart rate running low x 1 month - watch will alert her - will get down in the 40s    Shortness of Breath     hard time catching breath - using inhaler more than she should    Asthma     attacks - more frequently   . HPI  Presents today for bradycardia, shortness of breath, and asthma     Bradycardia:  Symptoms present for the last month with heart rates in the 40s, has prev hx of bradycardia in the 50's, sometimes in the 40's in the evening    Shortness of breath: Admits to a difficult time catching her breath and using her inhaler frequently, dx with COVID pneumonia 1 month ago, went to urgent care last week and CXR still showed pneumonia, also had EKG which showed bradycardia, got injection of steroids, ZPak, and a script for Prednisone taper. wore Holter Monitor 7 years ago and dx with irregular heartbeat    Asthma: Has been having asthma attacks more frequently. Using inhaler > 10 times a day. Was staking Zoloft 50 mg but states it made her tired and weight gain. Also admits to heartburn that is severe, taking TUMS, states that she was lightheaded 4 days ago and is having right arm tremor for the last 2 days.   Past Medical History:   Diagnosis Date    ADHD (attention deficit hyperactivity disorder)     Allergic rhinitis 07/25/2019    Asthma     COVID-19 08/03/2021    Kidney calculi     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     Seizures ([facility] [address]        Past Surgical History:   Procedure Laterality Date    CHOLECYSTECTOMY      TUBAL LIGATION         Social History     Socioeconomic History    Marital status:      Spouse name: Not on file    Number of children: Not on file    Years of education: Not on file    Highest education level: Not on file   Occupational History    Not on file   Tobacco Use    Smoking status: Never Smoker    Smokeless tobacco: Never Used   Vaping Use    Vaping Use: Never used   Substance and Sexual Activity    Alcohol use: No    Drug use: No    Sexual activity: Not Currently   Other Topics Concern    Not on file   Social History Narrative    Not on file     Social Determinants of Health     Financial Resource Strain: Low Risk     Difficulty of Paying Living Expenses: Not hard at all   Food Insecurity: No Food Insecurity    Worried About Running Out of Food in the Last Year: Never true    Ulysses of Food in the Last Year: Never true   Transportation Needs: No Transportation Needs    Lack of Transportation (Medical): No    Lack of Transportation (Non-Medical): No   Physical Activity:     Days of Exercise per Week:     Minutes of Exercise per Session:    Stress:     Feeling of Stress :    Social Connections:     Frequency of Communication with Friends and Family:     Frequency of Social Gatherings with Friends and Family:     Attends [employer] Services:     Active Member of Clubs or Organizations:     Attends Club or Organization Meetings:     Marital Status:    Intimate Partner Violence:     Fear of Current or Ex-Partner:     Emotionally Abused:     Physically Abused:     Sexually Abused:        Family History   Problem Relation Age of Onset    Other Mother         Hypoglycemia    Other Maternal Grandmother         Thyroid issues     Cancer Maternal Grandmother         Bone cancer     Diabetes Maternal Grandmother     Cancer Maternal Grandfather         Bone cancer     Diabetes Sister        Current Outpatient Medications   Medication Sig Dispense Refill    beclomethasone (QVAR) 40 MCG/ACT inhaler Inhale 2 puffs into the lungs 2 times daily 1 Inhaler 3    levETIRAcetam (KEPPRA) 500 MG tablet Take 2 tablets by mouth 2 times daily . Take 2 tablets in the morning and 1 tablet in the evening for the first week, then 2 tablets twice a day afterwords.  120 tablet 1    ibuprofen (ADVIL;MOTRIN) 800 MG tablet Take 1 tablet by mouth daily 120 tablet 0    albuterol sulfate HFA (VENTOLIN HFA) 108 (90 Base) MCG/ACT inhaler Inhale 2 puffs into the lungs 4 times daily as needed for Wheezing 1 Inhaler 0     No current facility-administered medications for this visit.        Immunization History   Administered Date(s) Administered    MMR 02/13/2019    Tdap (Boostrix, Adacel) 02/13/2019       Allergies   Allergen Reactions    Dye [Iodides] Other (See Comments)     Low HR    Sulfa Antibiotics Hives, Itching and Nausea Only    Vicodin [Hydrocodone-Acetaminophen] Other (See Comments)     Low HR     Codeine Palpitations       Admission on 08/03/2021, Discharged on 08/03/2021   Component Date Value Ref Range Status    Ventricular Rate 08/03/2021 80  BPM Final    Atrial Rate 08/03/2021 80  BPM Final    P-R Interval 08/03/2021 152  ms Final    QRS Duration 08/03/2021 72  ms Final    Q-T Interval 08/03/2021 360  ms Final    QTc Calculation (Bazett) 08/03/2021 415  ms Final    P Axis 08/03/2021 42  degrees Final    R Axis 08/03/2021 -2  degrees Final    T Axis 08/03/2021 -3  degrees Final    Diagnosis 08/03/2021 Normal sinus rhythmVoltage criteria for left ventricular hypertrophyAbnormal ECGNo previous ECGs availableConfirmed by Jeana Frankel MD, [facility] ([id number]) on 8/4/2021 7:32:06 AM   Final    WBC 08/03/2021 4.5  4.0 - 11.0 K/uL Final    RBC 08/03/2021 5.17  4.00 - 5.20 M/uL Final    Hemoglobin 08/03/2021 15.9  12.0 - 16.0 g/dL Final    Hematocrit 08/03/2021 46.0  36.0 - 48.0 % Final    MCV 08/03/2021 88.8  80.0 - 100.0 fL Final    MCH 08/03/2021 30.7  26.0 - 34.0 pg Final    MCHC 08/03/2021 34.5  31.0 - 36.0 g/dL Final    RDW 08/03/2021 13.0  12.4 - 15.4 % Final    Platelets 59/65/3588 223  135 - 450 K/uL Final    MPV 08/03/2021 7.6  5.0 - 10.5 fL Final    Neutrophils % 08/03/2021 60.5  % Final    Lymphocytes % 08/03/2021 32.9  % Final    Monocytes % 08/03/2021 6.1  % Final    Eosinophils % 08/03/2021 0.3  % Final    Basophils % 08/03/2021 0.2  % Final    Neutrophils Absolute 08/03/2021 2.7  1.7 - 7.7 K/uL Final    Lymphocytes Absolute 08/03/2021 1.5  1.0 - 5.1 K/uL Final    Monocytes Absolute 08/03/2021 0.3  0.0 - 1.3 K/uL Final    Eosinophils Absolute 08/03/2021 0.0  0.0 - 0.6 K/uL Final    Basophils Absolute 08/03/2021 0.0  0.0 - 0.2 K/uL Final    Sodium 08/03/2021 141  136 - 145 mmol/L Final    Potassium reflex Magnesium 08/03/2021 4.0  3.5 - 5.1 mmol/L Final    Chloride 08/03/2021 102  99 - 110 mmol/L Final    CO2 08/03/2021 27  21 - 32 mmol/L Final    Anion Gap 08/03/2021 12  3 - 16 Final    Glucose 08/03/2021 101* 70 - 99 mg/dL Final    BUN 08/03/2021 16  7 - 20 mg/dL Final    CREATININE 08/03/2021 0.7  0.6 - 1.1 mg/dL Final    GFR Non- 08/03/2021 >60  >60 Final    Comment: >60 mL/min/1.73m2 EGFR, calc. for ages 25 and older using the  MDRD formula (not corrected for weight), is valid for stable  renal function.  GFR  08/03/2021 >60  >60 Final    Comment: Chronic Kidney Disease: less than 60 ml/min/1.73 sq.m. Kidney Failure: less than 15 ml/min/1.73 sq.m. Results valid for patients 18 years and older.       Calcium 08/03/2021 9.9  8.3 - 10.6 mg/dL Final    Total Protein 08/03/2021 8.1  6.4 - 8.2 g/dL Final    Albumin 08/03/2021 4.5  3.4 - 5.0 g/dL Final    Albumin/Globulin Ratio 08/03/2021 1.3  1.1 - 2.2 Final    Total Bilirubin 08/03/2021 1.0  0.0 - 1.0 mg/dL Final    Alkaline Phosphatase 08/03/2021 92  40 - 129 U/L Final    ALT 08/03/2021 42* 10 - 40 U/L Final    AST 08/03/2021 51* 15 - 37 U/L Final    Globulin 08/03/2021 3.6  g/dL Final    Troponin 08/03/2021 <0.01  <0.01 ng/mL Final    Methodology by Troponin T    hCG Qual 08/03/2021 Negative  Detects HCG level >10 MIU/mL Final    Lactic Acid 08/03/2021 0.9  0.4 - 2.0 mmol/L Final    SARS-CoV-2 RNA, RT PCR 08/03/2021 DETECTED* NOT DETECTED Final    Comment: Detected results are indicative of the presence of SARS-CoV-2,  clinical correlation with patient history and other diagnostic  information is necessary to determine patient infection status. A Detected results do not rule out bacterial infection or  co-infection with other pathogens. Testing was performed using DADA JORGE SARS-CoV-2 and Influenza A/B  nucleic acid assay. This test is a multiplex Real-Time Reverse  Transcriptase Polymerase Chain Reaction (RT-PCR)-based in vitro  diagnostic test intended for the qualitative detection of nucleic  acids from SARS-CoV-2, influenza A, and influenza B in nasopharyngeal  and nasal swab specimens for use under the FDAs Emergency Use  Authorization (EUA) only. Patient Fact Sheet:  FindDrives.pl  Provider Fact Sheet: FindDrives.pl  EUA: FindDrives.pl  IFU: FindDrives.pl    Methodology:  RT-PCR      INFLUENZA A 08/03/2021 NOT DETECTED  NOT DETECTED Final    Comment: Note:  Influenza A and Influenza B negative results should be considered  presumptive in samples that have a Detected SARS-CoV-2 result. Consider  re-testing with an alternate FDA-approved test for Flu A & B if clinically  indicated.  INFLUENZA B 08/03/2021 NOT DETECTED  NOT DETECTED Final    Comment: Note:  Influenza A and Influenza B negative results should be considered  presumptive in samples that have a Detected SARS-CoV-2 result. Consider  re-testing with an alternate FDA-approved test for Flu A & B if clinically  indicated. Review of Systems   Respiratory: Positive for shortness of breath and wheezing. Cardiovascular:        Positive for bradycardia   Gastrointestinal:        Positive for heartburn   Allergic/Immunologic: Positive for environmental allergies.        /80 (Site: Left Upper Arm, Position: Sitting)   Pulse 67   Temp 99 °F (37.2 °C) (Temporal)   Resp 16   Wt 174 lb (78.9 kg)   LMP 07/26/2021 (Exact Date)   SpO2 98%   Breastfeeding No   BMI 30.82 kg/m²     Physical Exam  Vitals reviewed. Constitutional:       Appearance: She is well-developed. HENT:      Head: Normocephalic and atraumatic. Eyes:      Pupils: Pupils are equal, round, and reactive to light. Cardiovascular:      Rate and Rhythm: Normal rate and regular rhythm. Heart sounds: Normal heart sounds. No murmur heard. Pulmonary:      Effort: Pulmonary effort is normal. No respiratory distress. Breath sounds: Normal breath sounds. No wheezing, rhonchi or rales. Abdominal:      General: Bowel sounds are normal.      Tenderness: There is no abdominal tenderness. Musculoskeletal:      Cervical back: Normal range of motion. Neurological:      Mental Status: She is alert and oriented to person, place, and time. Psychiatric:         Behavior: Behavior normal.         Thought Content: Thought content normal.         Judgment: Judgment normal.         Plan   Diagnosis Orders   1. Shortness of breath  beclomethasone (QVAR) 40 MCG/ACT inhaler    Aneudy Salcedo MD, Cardiology, [facility] Eubanks [facility], [facility]   2. Bradycardia  Aneudy Salcedo MD, Cardiology, [facility]   3. Severe persistent asthma, unspecified whether complicated  beclomethasone (QVAR) 40 MCG/ACT inhaler   4. Pneumonia due to COVID-19 virus  XR CHEST STANDARD (2 VW)       Return in about 1 month (around 9/23/2021). Prior to Visit Medications    Medication Sig Taking? Authorizing Provider   beclomethasone (QVAR) 40 MCG/ACT inhaler Inhale 2 puffs into the lungs 2 times daily Yes Donah Carrel, DO   levETIRAcetam (KEPPRA) 500 MG tablet Take 2 tablets by mouth 2 times daily . Take 2 tablets in the morning and 1 tablet in the evening for the first week, then 2 tablets twice a day afterwords.  Yes Donah Carrel, DO   ibuprofen (ADVIL;MOTRIN) 800 MG tablet Take 1 tablet by mouth daily Yes Donah Carrel, DO albuterol sulfate HFA (VENTOLIN HFA) 108 (90 Base) MCG/ACT inhaler Inhale 2 puffs into the lungs 4 times daily as needed for Wheezing Yes Ngozi Holland, DO

## 2021-08-24 ENCOUNTER — HOSPITAL ENCOUNTER (OUTPATIENT)
Dept: GENERAL RADIOLOGY | Age: 36
Discharge: HOME | End: 2021-08-24
Payer: COMMERCIAL

## 2021-08-24 DIAGNOSIS — J12.82 PNEUMONIA DUE TO COVID-19 VIRUS: ICD-10-CM

## 2021-08-24 DIAGNOSIS — U07.1 PNEUMONIA DUE TO COVID-19 VIRUS: ICD-10-CM

## 2021-08-24 PROCEDURE — 71046 X-RAY EXAM CHEST 2 VIEWS: CPT

## 2021-09-08 ENCOUNTER — NURSE TRIAGE (OUTPATIENT)
Dept: OTHER | Facility: CLINIC | Age: 36
End: 2021-09-08

## 2021-09-08 NOTE — TELEPHONE ENCOUNTER
8/12 constant bleeding     Reason for Disposition   SEVERE abdominal pain (e.g., excruciating)   MODERATE vaginal bleeding (e.g., soaking pad or tampon per hour and present > 6 hours; 1 menstrual cup every 6 hours)    Answer Assessment - Initial Assessment Questions  1. AMOUNT: \"Describe the bleeding that you are having. \"     - SPOTTING: spotting, or pinkish / brownish mucous discharge; does not fill panti-liner or pad     - MILD:  less than 1 pad / hour; less than patient's usual menstrual bleeding    - MODERATE: 1-2 pads / hour; 1 menstrual cup every 6 hours; small-medium blood clots (e.g., pea, grape, small coin)    - SEVERE: soaking 2 or more pads/hour for 2 or more hours; 1 menstrual cup every 2 hours; bleeding not contained by pads or continuous red blood from vagina; large blood clots (e.g., golf ball, large coin)   Has not stopped bleeding. Went through 2.5 boxes of tampons and pad. Moderate     2. ONSET: \"When did the bleeding begin? \" \"Is it continuing now? \"      8/12    3. MENSTRUAL PERIOD: \"When was the last normal menstrual period? \" \"How is this different than your period? \"          July 20, on it for four days. Typical period is 4 days. 4. REGULARITY: \"How regular are your periods? \"  4 days a month, regular. 5. ABDOMINAL PAIN: \"Do you have any pain? \" \"How bad is the pain? \"  (e.g., Scale 1-10; mild, moderate, or severe)    - MILD (1-3): doesn't interfere with normal activities, abdomen soft and not tender to touch     - MODERATE (4-7): interferes with normal activities or awakens from sleep, tender to touch     - SEVERE (8-10): excruciating pain, doubled over, unable to do any normal activities       Yes, lower back and LLQ 8/10    6. PREGNANCY: \"Could you be pregnant? \" Arianne Cheung you sexually active? \" \"Did you recently give birth? \"      No - tubes are tied. 7. BREASTFEEDING: \"Are you breastfeeding? \"      No     8. HORMONES: [facility] you taking any hormone medications, prescription or OTC? \" (e.g., birth control pills, estrogen)      No, tubes tied. 9. BLOOD THINNERS: \"Do you take any blood thinners? \" (e.g., Coumadin/warfarin, Pradaxa/dabigatran, aspirin)      No     10. CAUSE: \"What do you think is causing the bleeding? \" (e.g., recent gyn surgery, recent gyn procedure; known bleeding disorder, cervical cancer, polycystic ovarian disease, fibroids)          Unsure     11. HEMODYNAMIC STATUS: \"Are you weak or feeling lightheaded? \" If so, ask: \"Can you stand and walk normally? \"         Lightheaded     12. OTHER SYMPTOMS: \"What other symptoms are you having with the bleeding? \" (e.g., passed tissue, vaginal discharge, fever, menstrual-type cramps)        Lightheaded and tired. Lower back pain and left lower underneath belly button. Protocols used: VAGINAL BLEEDING - ABNORMAL-ADULT-[place]    Received call from [place] at [facility] with Red Flag Complaint. Brief description of triage:vaginal bleeding 1 month, abdominal pain, back pain and dizziness. Triage indicates for patient to go to ED. Care advice provided, patient verbalizes understanding; denies any other questions or concerns; instructed to call back for any new or worsening symptoms. Attention Provider: Thank you for allowing me to participate in the care of your patient. The patient was connected to triage in response to information provided to the [facility]. Please do not respond through this encounter as the response is not directed to a shared pool.

## 2021-09-09 NOTE — PROGRESS NOTES
[facility]   Cardiac Consultation    Referring Provider:  Anastacia Hope DO     Chief Complaint   Patient presents with    New Patient    Follow-Up from [facility] [address] Other     Low heart rate on excertion    multiple c/o dizziness, SOB, chest pain, low HR    History of Present Illness:  Gustavo Childs 1985 is new patient today being referred by Anastacia Hope DO for low heart rate noted on her watch for a month that can get down to the 40's. Saw Dr. Becca Ho in 8/19 for CP. She has a PMH of ADHD, Covid-19, asthma, and seizures. Most recent Echo 8/30/2019 showed mild MR with EF 55%. She presented to ED on 8/3/2021 showed with shortness of breath having been tested positive for COVID on 7/25/2021. EKG 8/3/2021 noted NSR; voltage criteria for LVH; 80 bpm; nonspecific ST change. Reports wearing holter monitor 10 years ago with some \"pauses. \" (no copy to review)   Today, she reports that her low heart rate was noted at urgent care and ED. She had trouble breathing at the time  She feels short of breath with mask. She notes sharp and dull chest pain substernal radiating right of center lasting few minutes. It happens randomly. She has woken up in middle of night and noted low heart rate. She is  and getting ready to start back. Patient with no complaints of palpitations, dizziness, edema, or orthopnea/PND. Admits to a lot of stress over divorce which was just finalized. Long relationship since age 15. She has gained 30# since 8/19 (abciz=380#). Past Medical History:   has a past medical history of ADHD (attention deficit hyperactivity disorder), Allergic rhinitis, Asthma, COVID-19, Kidney calculi, Premature baby, and Seizures ([address] Surgical History:   has a past surgical history that includes Cholecystectomy and Tubal ligation. Social History:   reports that she has never smoked.  She has never used smokeless tobacco. She reports that she does not drink alcohol and does not use drugs. she just had divorce finalized. Has been with him for 20 years. She has not worked for a couple of years. She is planning to restart teaching. Family History:  family history includes Cancer in her maternal grandfather and maternal grandmother; Diabetes in her maternal grandmother and sister; Other in her maternal grandmother and mother. Home Medications:  Prior to Admission medications    Medication Sig Start Date End Date Taking? Authorizing Provider   beclomethasone (QVAR) 40 MCG/ACT inhaler Inhale 2 puffs into the lungs 2 times daily 8/23/21  Yes Regi Varela DO   levETIRAcetam (KEPPRA) 500 MG tablet Take 2 tablets by mouth 2 times daily . Take 2 tablets in the morning and 1 tablet in the evening for the first week, then 2 tablets twice a day afterwords. 6/3/21  Yes Regi Varela DO   ibuprofen (ADVIL;MOTRIN) 800 MG tablet Take 1 tablet by mouth daily 5/21/20  Yes Regi Varela DO   albuterol sulfate HFA (VENTOLIN HFA) 108 (90 Base) MCG/ACT inhaler Inhale 2 puffs into the lungs 4 times daily as needed for Wheezing 4/2/20  Yes Regi Varela DO        Allergies:  Dye [iodides], Sulfa antibiotics, Vicodin [hydrocodone-acetaminophen], and Codeine     Review of Systems:   · Constitutional: there has been no unanticipated weight loss. There's been no change in energy level, sleep pattern, or activity level. · Eyes: No visual changes or diplopia. No scleral icterus. · ENT: No Headaches, hearing loss or vertigo. No mouth sores or sore throat. · Cardiovascular: Reviewed in HPI  · Respiratory: No cough or wheezing, no sputum production. No hematemesis. · Gastrointestinal: No abdominal pain, appetite loss, blood in stools. No change in bowel or bladder habits. · Genitourinary: No dysuria, trouble voiding, or hematuria. · Musculoskeletal:  No gait disturbance, weakness or joint complaints.   · Integumentary: No rash or pruritis. · Neurological: No headache, diplopia, change in muscle strength, numbness or tingling. No change in gait, balance, coordination, mood, affect, memory, mentation, behavior. · Psychiatric: No anxiety, no depression. · Endocrine: No malaise, fatigue or temperature intolerance. No excessive thirst, fluid intake, or urination. No tremor. · Hematologic/Lymphatic: No abnormal bruising or bleeding, blood clots or swollen lymph nodes. · Allergic/Immunologic: No nasal congestion or hives. Physical Examination:    Vitals:    09/10/21 1315   BP: 116/80   Pulse: 81   Temp: 97.4 °F (36.3 °C)   SpO2: 99%        Constitutional and General Appearance: NAD   Respiratory:  · Normal excursion and expansion without use of accessory muscles  · Resp Auscultation: Normal breath sounds without dullness, clear, no crackles or wheezes  Cardiovascular:  · The apical impulses not displaced  · Heart tones are crisp and normal RRR  · Cervical veins are not engorged  · The carotid upstroke is normal in amplitude and contour without delay or bruit  · Normal S1S2, No S3, No Murmur  · Peripheral pulses are symmetrical and full  · There is no clubbing, cyanosis of the extremities. · No edema  · Femoral Arteries: 2+ and equal  · Pedal Pulses: 2+ and equal   Abdomen:  · No masses or tenderness  · Liver/Spleen: No Abnormalities Noted  Neurological/Psychiatric:  · Alert and oriented in all spheres  · Moves all extremities well  · Exhibits normal gait balance and coordination  · No abnormalities of mood, affect, memory, mentation, or behavior are noted  Skin:  · Skin: warm and dry.     Lab Results   Component Value Date     08/03/2021    K 4.0 08/03/2021     08/03/2021    CO2 27 08/03/2021    BUN 16 08/03/2021    CREATININE 0.7 08/03/2021    GLUCOSE 101 (H) 08/03/2021    CALCIUM 9.9 08/03/2021    PROT 8.1 08/03/2021    LABALBU 4.5 08/03/2021    BILITOT 1.0 08/03/2021    ALKPHOS 92 08/03/2021    AST 51 (H) 08/03/2021    ALT 42 (H) 08/03/2021    LABGLOM >60 08/03/2021    GFRAA >60 08/03/2021    AGRATIO 1.3 08/03/2021    GLOB 3.6 08/03/2021       Lab Results   Component Value Date    WBC 4.5 08/03/2021    HGB 15.9 08/03/2021    HCT 46.0 08/03/2021    MCV 88.8 08/03/2021     08/03/2021     Lab Results   Component Value Date    CHOL 190 06/09/2017     Lab Results   Component Value Date    TRIG 138 06/09/2017     Lab Results   Component Value Date    HDL 45 08/12/2019    HDL 37 (L) 06/09/2017     Lab Results   Component Value Date    LDLCALC 90 08/12/2019    LDLCALC 125 (H) 06/09/2017     Lab Results   Component Value Date    LABVLDL 18 08/12/2019    LABVLDL 28 06/09/2017     No results found for: CHOLHDLRATIO      Assessment:     1. Other chest pain: Unspecified chest pain. Concern for low HR as well. I will order plain GXT to assess exercise capacity, chronotropic competence, and EKG response to exercise stress. 2. Bradycardia: No issues in office today and EKG from 8/21 without problem. I told her HR can be lower at nighttime when sleeping. She does have multiple complaints including dizziness. Will order 48 hour holter monitor to evaluate for average HR and any evidence for AV conduction system disease. 3. SOB (shortness of breath): See #1 above. She c/o SOB and unspecified CP. ? Development of cardiomyopathy from recent stress related to divorce. I recommend ECHO to evaluate LV function and size, wall motion, and valves for any structural abnormalities. Plan:  1. Recommend cardiac monitor for 48 hours  2. Call [phone] to schedule echocardiogram and stress test.  We will call you with the results  3. Recommend COVID vaccine  4. Follow up pending results. I no concerning findings on testing will f/u PRN only. This note was scribed in the presence of Yomi Presley MD by Valerie Triana RN.      I, Dr. Yomi Presley, personally performed the services described in this documentation, as scribed by the above signed scribe in my presence. It is both accurate and complete to my knowledge. I agree with the details independently gathered by the clinical support staff, while the remaining scribed note accurately describes my personal service to the patient. Thank you for allowing me to participate in the care of this individual.    Yumiko Parra.  Bekah Rausch M.D., [facility]

## 2021-09-10 ENCOUNTER — TELEPHONE (OUTPATIENT)
Dept: CARDIOLOGY CLINIC | Age: 36
End: 2021-09-10

## 2021-09-10 ENCOUNTER — OFFICE VISIT (OUTPATIENT)
Dept: CARDIOLOGY CLINIC | Age: 36
End: 2021-09-10
Payer: COMMERCIAL

## 2021-09-10 VITALS
OXYGEN SATURATION: 99 % | TEMPERATURE: 97.4 F | BODY MASS INDEX: 31.18 KG/M2 | SYSTOLIC BLOOD PRESSURE: 116 MMHG | HEART RATE: 81 BPM | HEIGHT: 63 IN | DIASTOLIC BLOOD PRESSURE: 80 MMHG | WEIGHT: 176 LBS

## 2021-09-10 DIAGNOSIS — R00.1 BRADYCARDIA: Primary | ICD-10-CM

## 2021-09-10 DIAGNOSIS — R06.02 SOB (SHORTNESS OF BREATH): ICD-10-CM

## 2021-09-10 DIAGNOSIS — R07.89 OTHER CHEST PAIN: ICD-10-CM

## 2021-09-10 PROCEDURE — G8427 DOCREV CUR MEDS BY ELIG CLIN: HCPCS | Performed by: INTERNAL MEDICINE

## 2021-09-10 PROCEDURE — 99214 OFFICE O/P EST MOD 30 MIN: CPT | Performed by: INTERNAL MEDICINE

## 2021-09-10 PROCEDURE — 1036F TOBACCO NON-USER: CPT | Performed by: INTERNAL MEDICINE

## 2021-09-10 PROCEDURE — G8417 CALC BMI ABV UP PARAM F/U: HCPCS | Performed by: INTERNAL MEDICINE

## 2021-09-10 PROCEDURE — 93242 EXT ECG>48HR<7D RECORDING: CPT | Performed by: INTERNAL MEDICINE

## 2021-09-10 NOTE — PATIENT INSTRUCTIONS
Plan:  1. Recommend cardiac monitor for 48 hours  2. Call [phone] to schedule echocardiogram and stress test.  We will call you with the results  3. Recommend COVID vaccine  4.  Follow up pending results

## 2021-09-10 NOTE — TELEPHONE ENCOUNTER
Monitor placed by [employer] [facility]  Length of monitor 48 hrs. Monitor ordered by [facility]  Serial number [id number]  Kit ID N/A  Activation successful prior to pt leaving office?  YES

## 2021-10-07 ENCOUNTER — NURSE TRIAGE (OUTPATIENT)
Dept: OTHER | Facility: CLINIC | Age: 36
End: 2021-10-07

## 2021-10-07 NOTE — TELEPHONE ENCOUNTER
Received call from [facility] at [facility] with Red Flag Complaint. Brief description of triage: chest pain, seizure    Triage indicates for patient to go to the ED now \"I don't have time for all of that\" Discussed risks of not going to the Emergency Department related to multiple 2 seizures today and chest pain. Pt agreeable to go to be treated. Care advice provided, patient verbalizes understanding; denies any other questions or concerns; instructed to call back for any new or worsening symptoms. Attention Provider: Thank you for allowing me to participate in the care of your patient. The patient was connected to triage in response to information provided to the [facility]. Please do not respond through this encounter as the response is not directed to a shared pool. Reason for Disposition   Second seizure occurs on the same day    Answer Assessment - Initial Assessment Questions  1. ONSET: \"How long did the seizure last?\" (Minutes)       \"lasted longer than normal\" per children last night. Another seizure this morning. 2. CONTENT: \"Describe what happened during the seizure. Did the body become stiff? Was there any jerking? \"       \"a daze\" took her awhile to come back    3. CIRCUMSTANCE: \"What was the individual doing when the seizure began? \"       Last night was going to bed states it was a long day. She was having bad chest pain. Felt dizzy, lightheaded, hot. States chest pain was in scapula radiating into hand [id number]. At 0300 woke up and knew she had an episode bc she can tell since she had so many she knows what it feels like. States she had a history of chest pain an wore a holter monitor. 4. MENTAL STATUS: \"Does he know who he is, who you are, and where he is? \"       Alert and oriented at this time. States still feels like she is in a fog.     5. PRIOR SEIZURES: \"Has the individual had a seizure (convulsion) before? \" If so, ask: \"When was the last time? \" and \"What happened last time? \"      Stares off and then blacks out. States thinks she bit her tongue. 6. EPILEPSY: \"Does the individual have epilepsy? \" (note: check for medical ID jet)      Yes    7. MEDICATIONS: \"Does the individual take anticonvulsant medications? \" (e.g., yes/no, compliance, any recent changes)      Ran out of Keppra weeks ago    8. INJURY: \"Did the individual hurt himself during the seizure? \" (e.g., head, tongue)      Bit tongue. 9. OTHER SYMPTOMS: \"Are there any other symptoms? \" (e.g., fever, headache)      Feeling hot but doesn't have a fever. Headache for \"couple days straight\" right eye pressure radiating to top of head 5/10. Took Tylenol. 10. PREGNANCY: \"Is there any chance you are pregnant? \" \"When was your last menstrual period? \"        LMP completed 2 days ago.     Protocols used: [facility]

## 2021-11-01 ENCOUNTER — TELEPHONE (OUTPATIENT)
Dept: PULMONOLOGY | Age: 36
End: 2021-11-01

## 2021-11-01 NOTE — TELEPHONE ENCOUNTER
Patient did not show for New Pt  appointment  with Dr. Noriega Hearing on 11/1/21    Same Day Cancellation: No    Patient rescheduled:  No    Patient was also no show on: N/A    LOV N/A

## 2021-12-13 ENCOUNTER — TELEPHONE (OUTPATIENT)
Dept: CARDIOLOGY CLINIC | Age: 36
End: 2021-12-13

## 2021-12-13 NOTE — TELEPHONE ENCOUNTER
PT is calling for results of heart monitor (CAM) that was uploaded on 9.13.21. Pt has never heard back.     Please call pt @ [phone]

## 2021-12-14 NOTE — TELEPHONE ENCOUNTER
I will check on the monitor results when I am back at [facility] office this week.

## 2021-12-17 ENCOUNTER — TELEPHONE (OUTPATIENT)
Dept: CARDIOLOGY CLINIC | Age: 36
End: 2021-12-17

## 2021-12-22 PROCEDURE — 93244 EXT ECG>48HR<7D REV&INTERPJ: CPT | Performed by: INTERNAL MEDICINE

## 2021-12-27 ENCOUNTER — TELEPHONE (OUTPATIENT)
Dept: CARDIOLOGY CLINIC | Age: 36
End: 2021-12-27

## 2021-12-27 DIAGNOSIS — R00.1 BRADYCARDIA: ICD-10-CM

## 2022-01-04 ENCOUNTER — NURSE TRIAGE (OUTPATIENT)
Dept: OTHER | Facility: CLINIC | Age: 37
End: 2022-01-04

## 2022-01-04 ENCOUNTER — TELEPHONE (OUTPATIENT)
Dept: CARDIOLOGY CLINIC | Age: 37
End: 2022-01-04

## 2022-01-04 ENCOUNTER — VIRTUAL VISIT (OUTPATIENT)
Dept: FAMILY MEDICINE CLINIC | Age: 37
End: 2022-01-04
Payer: COMMERCIAL

## 2022-01-04 DIAGNOSIS — I73.00 RAYNAUD'S PHENOMENON WITHOUT GANGRENE: ICD-10-CM

## 2022-01-04 DIAGNOSIS — R56.9 SEIZURES (HCC): ICD-10-CM

## 2022-01-04 DIAGNOSIS — L50.9 URTICARIA: Primary | ICD-10-CM

## 2022-01-04 DIAGNOSIS — Z71.2 ENCOUNTER TO DISCUSS TEST RESULTS: ICD-10-CM

## 2022-01-04 PROCEDURE — 99214 OFFICE O/P EST MOD 30 MIN: CPT | Performed by: FAMILY MEDICINE

## 2022-01-04 PROCEDURE — G8427 DOCREV CUR MEDS BY ELIG CLIN: HCPCS | Performed by: FAMILY MEDICINE

## 2022-01-04 RX ORDER — METHYLPREDNISOLONE 4 MG/1
TABLET ORAL
Qty: 1 KIT | Refills: 0 | Status: SHIPPED | OUTPATIENT
Start: 2022-01-04 | End: 2022-01-28

## 2022-01-04 NOTE — PATIENT INSTRUCTIONS
Patient Education        Hives: Care Instructions  Your Care Instructions  Hives are raised, red, itchy patches of skin. They are also called wheals or welts. They usually have red borders and pale centers. Hives range in size from ¼ inch to 3 inches or more across. They may seem to move from place to place on the skin. Several hives may form a large area of raised, red skin. You can get hives after an insect sting, after taking medicine or eating certain foods, or because of infection or stress. Other causes include plants, things you breathe in, makeup, heat, cold, sunlight, and latex. You cannot spread hives to other people. Hives may last a few minutes or a few days, but a single spot may last less than 36 hours. Follow-up care is a key part of your treatment and safety. Be sure to make and go to all appointments, and call your doctor if you are having problems. It's also a good idea to know your test results and keep a list of the medicines you take. How can you care for yourself at home? · Avoid whatever you think may have caused your hives, such as a certain food or medicine. However, you may not know the cause. · Put a cool, wet towel on the area to relieve itching. · Take an over-the-counter antihistamine, such as diphenhydramine (Benadryl), cetirizine (Zyrtec), or loratadine (Claritin), to help stop the hives and calm the itching. Read and follow directions on the label. These medicines can make you feel sleepy. Do not drive while using them. · Stay away from strong soaps, detergents, and chemicals. These can make itching worse. When should you call for help? Call 911 anytime you think you may need emergency care. For example, call if:    · You have symptoms of a severe allergic reaction. These may include:  ? Sudden raised, red areas (hives) all over your body. ? Swelling of the throat, mouth, lips, or tongue. ? Trouble breathing. ? Passing out (losing consciousness).  Or you may feel very lightheaded or suddenly feel weak, confused, or restless. Call your doctor now or seek immediate medical care if:    · You have symptoms of an allergic reaction, such as:  ? A rash or hives (raised, red areas on the skin). ? Itching. ? Swelling. ? Belly pain, nausea, or vomiting.     · You get hives after you start a new medicine.     · Hives have not gone away after 24 hours. Watch closely for changes in your health, and be sure to contact your doctor if:    · You do not get better as expected. Where can you learn more? Go to https://chpepiceweb.[email or web address]. org and sign in to your [employer] account. Enter L777 in the [employer] box to learn more about \"Hives: Care Instructions. \"     If you do not have an account, please click on the \"Sign Up Now\" link. Current as of: July 1, 2021               Content Version: 13.1  © 2006-2021 [employer]. Care instructions adapted under license by [facility] ([facility]). If you have questions about a medical condition or this instruction, always ask your healthcare professional. [place] [address] any warranty or liability for your use of this information. Patient Education        Raynaud's Phenomenon: Care Instructions  Overview  Raynaud's is a condition that causes your hands and feet to overreact to cold. They may become painful and numb, and they can change colors, becoming very pale and then blue. This condition also is called Raynaud's phenomenon. There are two kinds of Raynaud's. Primary Raynaud's, also known as Raynaud's disease, happens by itself and is the most common form. Secondary Raynaud's, also called Raynaud's syndrome, happens as part of another disease. In Raynaud's, the small vessels that bring blood to the skin either become narrow, or constrict for a short period of time. This limits blood flow to the hands and feet and sometimes to the nose or ears.  Your hands and feet feel cold and numb and then turn very pale. As blood flow returns, your fingers and toes may turn red, and begin to throb and hurt. Raynaud's can be painful and annoying, but it usually does not cause serious problems. You can take simple steps to protect your hands and feet from the cold. If you have a bad case of Raynaud's and you cannot keep your hands and feet warm enough, your doctor may prescribe medicine. Follow-up care is a key part of your treatment and safety. Be sure to make and go to all appointments, and call your doctor if you are having problems. It's also a good idea to know your test results and keep a list of the medicines you take. How can you care for yourself at home? To prevent Raynaud's episodes or ease symptoms  · Run warm water over your hands or feet to increase blood flow. Use another part of your body, such as your forearm, to make sure the water is not too hot; you could burn your hands or feet and not feel it because they are numb. · Swing your arms in a [place] at the sides of your body (\"windmilling\") to increase blood flow. · If your doctor prescribes medicine to help Raynaud's, take it exactly as prescribed. Call your doctor if you think you are having a problem with your medicine. · If another condition causes your Raynaud's, make sure to follow your treatment for that condition. · Wear mittens or gloves when it is cold outside. Mittens are warmer than gloves because they keep your fingers together. Gloves underneath mittens will keep your hands warmer than gloves alone. You also can use pot holders or oven mitts when getting something from the freezer or refrigerator. · You can slip chemical hand warmers into your mittens or gloves when you do outside activities. · Do not smoke. Nicotine makes blood vessels constrict, which can bring on an attack. If you need help quitting, talk to your doctor about stop-smoking programs and medicines. These can increase your chances of quitting for good.   · Avoid caffeine and cold medicines that have pseudoephedrine. They make blood vessels constrict. Beta-blocker medicines,often used to treat high blood pressure, also can make Raynaud's worse. · Drink plenty of fluids to prevent dehydration, which can lower the amount of blood moving through the blood vessels. If you have kidney, heart, or liver disease and have to limit fluids, talk with your doctor before you increase the amount of fluids you drink. · Try to stay calm when you are under stress. Anxiety can make your blood vessels constrict and lead to a Raynaud's attack. To keep your whole body warm  · Eat a hot meal and drink a warm liquid before going outside. They may help raise your body temperature. · Wear layers of warm clothing. The inner layer should be made of a material such as polypropylene that pulls moisture away from your body. · Wear a hat. · Do not wear clothing that is too tight. It can decrease or cut off blood flow. · Try to stay dry. Choose waterproof, breathable jackets and boots. Being wet makes you more likely to become chilled. When should you call for help? Call your doctor now or seek immediate medical care if:    · You have severe pain in your hands or feet.     · Normal color does not return to your hands or feet.     · Your hands or feet do not warm up even after home care. Watch closely for changes in your health, and be sure to contact your doctor if you have any problems. Where can you learn more? Go to https://edenes.[employer]. org and sign in to your [employer] account. Enter P043 in the [facility] box to learn more about \"Raynaud's Phenomenon: Care Instructions. \"     If you do not have an account, please click on the \"Sign Up Now\" link. Current as of: April 30, 2021               Content Version: 13.1  © 2006-2021 [employer]. Care instructions adapted under license by [facility] ([facility]).  If you have questions about a medical condition or this instruction, always ask your healthcare professional. [place] [address] any warranty or liability for your use of this information.

## 2022-01-04 NOTE — PROGRESS NOTES
TELEHEALTH EVALUATION -- Audio/Visual (During [facility]- public health emergency)    HPI:  Robyn Esteban (:  1985) is a 40 y.o. female,  here for evaluation of the following chief complaint(s):  Rash      ASSESSMENT/PLAN:   Diagnosis Orders   1. Urticaria     2. Raynaud's phenomenon without gangrene       Jarvis was seen today for rash. Diagnoses and all orders for this visit:    Urticaria  Prescribe Medrol Dosepak  Continue Benadryl  Discussed urticaria and given information in the AVS  Raynaud's phenomenon without gangrene  Discussed conservative therapy like keeping hands warm, discussed Raynaud's phenomenon, information given in the AVS including treatment options. We discussed verapamil or calcium channel blocker however she recently had a cardiac event monitor for bradycardia. Discussed test results from cardiologist:  Reviewed event monitor with her it showed normal sinus rhythm. She could be a candidate for the calcium channel blocker but I do recommend that she check with her cardiologist first.  She is interested in pursuing that she can follow-up. 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 It started on her chest and was blotchy and somewhat itchy, and then later on that evening extended to her face. She said it is not associated with shortness of breath. She is having symptoms of Raynaud's phenomenon with her hands turning blue. This is chronic but she states is happening more often and what happened yesterday too. She was evaluated by her cardiologist with testing. She recently had a cardiac event monitor and wanted to go over the results. And those results were reviewed. Plan is to start steroids for the hives. Continue the Benadryl, if that causes drowsiness can switch to once a day Claritin or Alavert. Discussed the nature of urticaria/hives.   Hives can sometimes start after a viral illness therfore discussed getting a Covid test to make sure that the hives are not in relation to a\covid   fshe wants to start treatment for her Raynaud's she can check with her cardiologist and we could consider verapamil for this. Some patients just take it in the winter when it is the worst.  She will follow up with Dr. Janes Parker if interested. Discussed the nature of Raynaud's. Review of Systems   As above  Allergic/Immunologic: Negative for immunocompromised state. Psychiatric/Behavioral: Negative for agitation, behavioral problems and confusion. Physical Exam    Constitutional: [x] Appears well-developed and well-nourished [x] No apparent distress      [] Abnormal-   Mental status  [x] Alert and awake  [x] Oriented to person/place/time [x]Able to follow commands      Eyes:  EOM    [x]  Normal  [] Abnormal-  Sclera  [x]  Normal  [] Abnormal -         Discharge [x]  None visible  [] Abnormal -    HENT:   [x] Normocephalic, atraumatic.   [] Abnormal   [] Mouth/Throat: Mucous membranes are moist.     External Ears [x] Normal  [] Abnormal-     Neck: [x] No visualized mass     Pulmonary/Chest: [x] Respiratory effort normal.  [x] No visualized signs of difficulty breathing or respiratory distress        [] Abnormal-    Able to speak in full sentences without difficulty  Musculoskeletal:   [] Normal gait with no signs of ataxia         [x] Normal range of motion of neck        [] Abnormal-       Neurological:        [x] No Facial Asymmetry (Cranial nerve 7 motor function) (limited exam to video visit)          [x] No gaze palsy        [] Abnormal-         Skin:        [x] No significant exanthematous lesions or discoloration noted on facial skin         [] Abnormal-            Psychiatric:       [x] Normal Affect [] No Hallucinations        [] Abnormal-   Judgment, behavior, thought and mood are normal.          Time spent today included for this patient visit includes time spent preparing to see the patient  Including review of tests, labs and imaging,   revewing previous history and recent encounters,   obtaining and/or reviewing separately obtained history in care everywhere or record,   performing a medically appropriate examination and/or evaluation;   counseling and educating the patient   ordering medications, tests, or procedures;   referring to other health care specialists if applicable;   documenting clinical information in the electronic health record;   independently interpreting results (not separately reported)   and communicating results to the patient. ([facility]), evaluation of the following organ systems was limited: Vitals/Constitutional/EENT/Resp/CV/GI//MS/Neuro/Skin/Heme-Lymph-Imm. Pursuant to the emergency declaration under the [address] [place] [address] authority and the [employer] Act, this Virtual Visit was conducted with patient's (and/or legal guardian's) consent, to reduce the patient's risk of exposure to COVID-19 and provide necessary medical care. The patient (and/or legal guardian) has also been advised to contact this office for worsening conditions or problems, and seek emergency medical treatment and/or call 911 if deemed necessary. Patient initiated the encounter and gave consent for the encounter. Services were provided through a video synchronous discussion virtually to substitute for in-person clinic visit.  Patient and provider were located at their individual homes

## 2022-01-05 ENCOUNTER — TELEPHONE (OUTPATIENT)
Dept: FAMILY MEDICINE CLINIC | Age: 37
End: 2022-01-05

## 2022-01-05 RX ORDER — LEVETIRACETAM 500 MG/1
500 TABLET ORAL DAILY
Qty: 30 TABLET | Refills: 2 | Status: SHIPPED | OUTPATIENT
Start: 2022-01-05

## 2022-01-05 NOTE — TELEPHONE ENCOUNTER
LOV 1/4/2022    Future Appointments   Date Time Provider Noel Buitrago   1/5/2022  8:20 AM DO CHESTER Saab Cingary - DYD   1/28/2022  9:00 AM Chasity Thurston MD AND [facility]
Patient cut herself down to 1 tablet a day a few weeks ago and has been feeling better. Only taking 1 a day
Please verify how many time day she takes the [address]. Thank you.
What Is The Reason For Today's Visit?: Excessive sun exposure

## 2022-01-05 NOTE — TELEPHONE ENCOUNTER
Patient no show appointment. I called patient and had to leave voice message. Mailed out no show letter.

## 2022-01-28 ENCOUNTER — OFFICE VISIT (OUTPATIENT)
Dept: PULMONOLOGY | Age: 37
End: 2022-01-28
Payer: COMMERCIAL

## 2022-01-28 VITALS
SYSTOLIC BLOOD PRESSURE: 115 MMHG | OXYGEN SATURATION: 100 % | WEIGHT: 173 LBS | HEIGHT: 63 IN | TEMPERATURE: 98.1 F | DIASTOLIC BLOOD PRESSURE: 73 MMHG | RESPIRATION RATE: 16 BRPM | BODY MASS INDEX: 30.65 KG/M2 | HEART RATE: 67 BPM

## 2022-01-28 DIAGNOSIS — G47.33 OSA (OBSTRUCTIVE SLEEP APNEA): ICD-10-CM

## 2022-01-28 DIAGNOSIS — E66.9 CLASS 1 OBESITY WITH BODY MASS INDEX (BMI) OF 30.0 TO 30.9 IN ADULT, UNSPECIFIED OBESITY TYPE, UNSPECIFIED WHETHER SERIOUS COMORBIDITY PRESENT: ICD-10-CM

## 2022-01-28 DIAGNOSIS — Z86.16 PERSONAL HISTORY OF COVID-19: ICD-10-CM

## 2022-01-28 DIAGNOSIS — Z11.52 ENCOUNTER FOR SCREENING FOR COVID-19: ICD-10-CM

## 2022-01-28 DIAGNOSIS — R06.02 SOB (SHORTNESS OF BREATH): Primary | ICD-10-CM

## 2022-01-28 DIAGNOSIS — Q32.4 CONGENITAL BRONCHIAL ATRESIA: ICD-10-CM

## 2022-01-28 PROBLEM — E66.811 CLASS 1 OBESITY IN ADULT: Status: ACTIVE | Noted: 2022-01-28

## 2022-01-28 PROCEDURE — G8427 DOCREV CUR MEDS BY ELIG CLIN: HCPCS | Performed by: INTERNAL MEDICINE

## 2022-01-28 PROCEDURE — 99244 OFF/OP CNSLTJ NEW/EST MOD 40: CPT | Performed by: INTERNAL MEDICINE

## 2022-01-28 PROCEDURE — G8417 CALC BMI ABV UP PARAM F/U: HCPCS | Performed by: INTERNAL MEDICINE

## 2022-01-28 PROCEDURE — G8484 FLU IMMUNIZE NO ADMIN: HCPCS | Performed by: INTERNAL MEDICINE

## 2022-01-28 RX ORDER — ALBUTEROL SULFATE 90 UG/1
2 AEROSOL, METERED RESPIRATORY (INHALATION) EVERY 6 HOURS PRN
Qty: 1 EACH | Refills: 5 | Status: SHIPPED | OUTPATIENT
Start: 2022-01-28

## 2022-01-28 ASSESSMENT — SLEEP AND FATIGUE QUESTIONNAIRES
HOW LIKELY ARE YOU TO NOD OFF OR FALL ASLEEP IN A CAR, WHILE STOPPED FOR A FEW MINUTES IN TRAFFIC: 0
HOW LIKELY ARE YOU TO NOD OFF OR FALL ASLEEP WHILE SITTING AND TALKING TO SOMEONE: 0
ESS TOTAL SCORE: 10
HOW LIKELY ARE YOU TO NOD OFF OR FALL ASLEEP WHILE SITTING AND READING: 1
HOW LIKELY ARE YOU TO NOD OFF OR FALL ASLEEP WHILE WATCHING TV: 3
HOW LIKELY ARE YOU TO NOD OFF OR FALL ASLEEP WHILE SITTING INACTIVE IN A PUBLIC PLACE: 0
HOW LIKELY ARE YOU TO NOD OFF OR FALL ASLEEP WHILE LYING DOWN TO REST IN THE AFTERNOON WHEN CIRCUMSTANCES PERMIT: 3
HOW LIKELY ARE YOU TO NOD OFF OR FALL ASLEEP WHEN YOU ARE A PASSENGER IN A CAR FOR AN HOUR WITHOUT A BREAK: 3
NECK CIRCUMFERENCE (INCHES): 13.25
HOW LIKELY ARE YOU TO NOD OFF OR FALL ASLEEP WHILE SITTING QUIETLY AFTER LUNCH WITHOUT ALCOHOL: 0

## 2022-01-28 NOTE — PATIENT INSTRUCTIONS
Remember to bring a list of pulmonary medications and any CPAP or BiPAP machines to your next appointment with the office. Please keep all of your future appointments scheduled by [facility] office. Out of respect for other patients and providers, you may be asked to reschedule your appointment if you arrive later than your scheduled appointment time. Appointments cancelled less than 24hrs in advance will be considered a no show. Patients with three missed appointments within 1 year or four missed appointments within 2 years can be dismissed from the practice. Please be aware that our physicians are required to work in the Intensive Care Unit at [facility].  Your appointment may need to be rescheduled if they are designated to work during your appointment time. You may receive a survey regarding the care you received during your visit. Your input is valuable to us. We encourage you to complete and return your survey. We hope you will choose us in the future for your healthcare needs. Pt instructed of all future appointment dates & times, including radiology, labs, procedures & referrals. If procedures were scheduled preparation instructions provided. Instructions on future appointments with [facility] were given. The Sleep Center will call to schedule your sleep study. If you have any questions they can be reached at [phone].

## 2022-01-28 NOTE — PROGRESS NOTES
Chief Complaint/Referring Provider:  Patient is being seen at the request of Dr. Corky Reis for a consultation for shortness of breath      Presenting HPI: Patient is a 40-year-old female who has come to the office for a pulmonary evaluation  Patient states that she has been having increasing shortness of breath for last few years time, patient states that about few years back she was evaluated by a pulmonologist in [place] and patient was told that her right side of the lung is not fully developed which is causing her to have shortness of breath, patient states that lately she has been having more shortness of breath, patient also was taking inhalers before but she does not have any prescription for that and has not been using them, patient also states that 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 causes her to have migraines also patient states that any cleaning agent like lactulose of bleach causes her to have coughing and choking and patient is making her kids do that job, patient states that if she walks up a flight of stairs or 2 he starts having more shortness of breath, patient also has occasional coughing with shortness of breath, patient also states that she had an episode of epistaxis yesterday but it is not a chronic pain, patient states that she has a electric base heating, patient has occasional wheezing along with the shortness of breath, patient does not have any significant expectoration, patient also has a  dull pain in the center of the chest with some radiation to the back, patient states that she sees a cardiologist and patient underwent various evaluations and was told that she is not getting enough blood and patient supposed to have a carotid Doppler in the near future, patient states that her stress test for the heart was okay but patient states that when she has this shortness of breath and chest pain she also has some dizziness, patient also states that on a flat surface at her own pace she can walk about 1 mile but climbing stairs or an incline is a different story, patient also has been having some swelling of the hands and feet along with that patient also is having symptoms of Raynaud's phenomena, patient states that she does not have any personal history of collagen vascular disorders that has been told to her, patient states that she is was adopted so she does not know her family history, patient has not been a smoker, patient states that she feels tired and having no energy in the daytime, patient states that she also has been waking up in the middle of the night on her home gasping for breath sometimes, patient states that she is anxious also patient states that she underwent her divorce last year which caused her to have more issues, patient also states that she will wears a smart watch and sometimes it gives her alarm that her oxygen saturations are less than 50% at nighttime, patient also states that she sold her house and moved to a apartment complex and the apartment she is living in has a lot of mold understanding and the landlord came to medicated but she is not sure if it is taken care of, patient states that she still has some shortness of 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, patient's out of her inhalers as stated before patient also states that she was recently diagnosed as having seizure disorders and patient was evaluated and patient was told that she has swelling of the frontal lobe and the etiology of that is not known, patient did have a COVID-19 infection in August 2021, patient also has had some pleuritic chest pain, patient does not give any chronic back issues per se, patient does not have any other pertinent review of system of concern    Past Medical History:   Diagnosis Date    ADHD (attention deficit hyperactivity disorder)     Allergic rhinitis 07/25/2019    Asthma     COVID-19 08/03/2021    Kidney calculi     PALOMA (obstructive sleep apnea) 1/28/2022    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     Seizures (HCC)        Past Surgical History:   Procedure Laterality Date    CHOLECYSTECTOMY      TUBAL LIGATION         Allergies   Allergen Reactions    Dye [Iodides] Other (See Comments)     Low HR    Sulfa Antibiotics Hives, Itching and Nausea Only    Vicodin [Hydrocodone-Acetaminophen] Other (See Comments)     Low HR     Codeine Palpitations       Medication list was reviewed and updated as needed in [facility]    Social History     Socioeconomic History    Marital status:      Spouse name: Not on file    Number of children: Not on file    Years of education: Not on file    Highest education level: Not on file   Occupational History    Not on file   Tobacco Use    Smoking status: Never Smoker    Smokeless tobacco: Never Used   Vaping Use    Vaping Use: Never used   Substance and Sexual Activity    Alcohol use: No    Drug use: No    Sexual activity: Not Currently   Other Topics Concern    Not on file   Social History Narrative    Not on file     Social Determinants of Health     Financial Resource Strain: Low Risk     Difficulty of Paying Living Expenses: Not hard at all   Food Insecurity: No Food Insecurity    Worried About Running Out of Food in the Last Year: Never true    [place]es of Food in the Last Year: Never true   Transportation Needs: No Transportation Needs    Lack of Transportation (Medical): No    Lack of Transportation (Non-Medical):  No   Physical Activity:     Days of Exercise per Week: Not on file    Minutes of Exercise per Session: Not on file   Stress:     Feeling of Stress : Not on file   Social Connections:     Frequency of Communication with Friends and Family: Not on file    Frequency of Social Gatherings with Friends and Family: Not on file    Attends Rastafarian Services: Not on file    Active Member of Clubs or Organizations: Not on file    Attends Club or Organization Meetings: Not on file    Marital Status: Not on file   Intimate Partner Violence:     Fear of Current or Ex-Partner: Not on file    Emotionally Abused: Not on file    Physically Abused: Not on file    Sexually Abused: Not on file   Housing Stability: Unknown    Unable to Pay for Housing in the Last Year: No    Number of [place] in the Last Year: Not on file    Unstable Housing in the Last Year: No       Family History   Problem Relation Age of Onset    Other Mother         Hypoglycemia    Other Maternal Grandmother         Thyroid issues     Cancer Maternal Grandmother         Bone cancer     Diabetes Maternal Grandmother     Cancer Maternal Grandfather         Bone cancer     Diabetes Sister             Review of Systems same as above    Physical Exam:  Blood pressure 115/73, pulse 67, temperature 98.1 °F (36.7 °C), temperature source Temporal, resp. rate 16, height 5' 3\" (1.6 m), weight 173 lb (78.5 kg), last menstrual period 12/28/2021, SpO2 100 %, not currently breastfeeding.'  Constitutional:  No acute distress. HENT:  Oropharynx is clear and moist. No thyromegaly. Eyes:  Conjunctivae arenormal. Pupils equal, round, and reactive to light. No scleral icterus. Neck: . No tracheal deviation present. No obvious thyroid mass. Cardiovascular:Normal rate, regular rhythm, normal heart sounds. No right ventricular heave. Nolower extremity edema. Pulmonary/Chest: No wheezes. No rales. Chest wall is not dull to percussion. Noaccessory muscle usage or stridor. Decreased breath sound intensity  Abdominal: Soft. Bowel sounds present. No distension or hernia. Notenderness. Musculoskeletal: No cyanosis. No clubbing. No obvious joint deformity. Lymphadenopathy: No cervical or supraclavicular adenopathy. Skin: Skin is warm and dry. No rash or nodules on the exposed extremities. Psychiatric: Normal mood and affect. Behavior is normal.  No anxiety. Neurologic: Alert, awake and oriented. YAIMA. Speech fluent      Sleep Medicine Data:  Sitting and reading: Slight chance of dozing  Watching TV: High chance of dozing  Sitting, inactive in a public place (e.g. a theatre or a meeting): Would never doze  As a passenger in a car for an hour without a break: High chance of dozing  Lying down to rest in the afternoon when circumstances permit: High chance of dozing  Sitting and talking to someone: Would never doze  Sitting quietly after a lunch without alcohol: Would never doze  In a car, while stopped for a few minutes in traffic: Would never doze  Total score: 10  Neck circumference: 13.25    Data:     Imaging:  I have reviewed radiology images personally. CT CHEST WO CONTRAST    (Results Pending)     CT chest from care everywhere-2013-No pulmonary emboli. Bronchial atresia involving left upper lobe bronchus with some associated   peripheral mucoceles and hyperlucent left upper lobe. Otherwise unremarkable CT angiogram of   the chest.          Assessment:    1. SOB (shortness of breath)    - albuterol sulfate HFA (VENTOLIN HFA) 108 (90 Base) MCG/ACT inhaler; Inhale 2 puffs into the lungs every 6 hours as needed for Wheezing or Shortness of Breath  Dispense: 1 each; Refill: 5  - CT CHEST WO CONTRAST; Future  - Full PFT Study With Bronchodilator; Future    2. Congenital bronchial atresia    - albuterol sulfate HFA (VENTOLIN HFA) 108 (90 Base) MCG/ACT inhaler; Inhale 2 puffs into the lungs every 6 hours as needed for Wheezing or Shortness of Breath  Dispense: 1 each; Refill: 5  - CT CHEST WO CONTRAST; Future  - Full PFT Study With Bronchodilator; Future    3. PALOMA (obstructive sleep apnea)    - CT CHEST WO CONTRAST; Future  - Home Sleep Study; Future    4. Encounter for screening for COVID-19    - COVID-19; Future    5. Personal history of COVID-19    6.   Obesity          Plan:   · Patient's review of system were discussed  · Patient was told about the clinical findings during auscultation and implications  · Patient's x-ray chest from last year when she had a COVID was reviewed  · Patient's CT of the chest from care everywhere from 2013 was reviewed  · Patient was told about the pathophysiology of the disease process and its modifying factors  · Clinically patient appears to have PALOMA  · Patient was also told about the pathophysiology and sequelae of PALOMA  · Patient needs to have a PFT which is being ordered  · Patient also needs a CT of the chest to assess the anatomy of the lungs and to give more recommendations  · Patient also will benefit from a home sleep study which is being ordered for the patient  · Patient was told about diet and lifestyle modifications  · Patient will benefit from weight loss  · A regular regimented exercise will help  · A humidifier in the bedroom will help  · Steam inhalation will help  · Patient's prescription for albuterol inhaler 2 puffs every 6 on whenever necessary basis was renewed  · Patient inhaler technique was reviewed and patient was told how to take it properly and patient exhibits understanding  · Would not recommend any Qvar for now till reassessed after the test results  · Further management depending on patient clinical status and the follow-up on above recommendations along with the test results

## 2022-02-10 ENCOUNTER — HOSPITAL ENCOUNTER (OUTPATIENT)
Dept: SLEEP CENTER | Age: 37
Discharge: HOME | End: 2022-02-12
Payer: COMMERCIAL

## 2022-02-10 DIAGNOSIS — G47.33 OSA (OBSTRUCTIVE SLEEP APNEA): ICD-10-CM

## 2022-02-10 PROCEDURE — 95806 SLEEP STUDY UNATT&RESP EFFT: CPT

## 2022-02-10 PROCEDURE — 95806 SLEEP STUDY UNATT&RESP EFFT: CPT | Performed by: INTERNAL MEDICINE

## 2022-03-02 ENCOUNTER — HOSPITAL ENCOUNTER (OUTPATIENT)
Dept: CT IMAGING | Age: 37
Discharge: HOME | End: 2022-03-02
Payer: COMMERCIAL

## 2022-03-02 ENCOUNTER — HOSPITAL ENCOUNTER (OUTPATIENT)
Dept: PULMONOLOGY | Age: 37
Discharge: HOME | End: 2022-03-02
Payer: COMMERCIAL

## 2022-03-02 VITALS — OXYGEN SATURATION: 99 %

## 2022-03-02 DIAGNOSIS — Q32.4 CONGENITAL BRONCHIAL ATRESIA: ICD-10-CM

## 2022-03-02 DIAGNOSIS — R06.02 SOB (SHORTNESS OF BREATH): ICD-10-CM

## 2022-03-02 DIAGNOSIS — G47.33 OSA (OBSTRUCTIVE SLEEP APNEA): ICD-10-CM

## 2022-03-02 LAB
DLCO %PRED: 147 %
DLCO PRED: NORMAL
DLCO/VA %PRED: NORMAL
DLCO/VA PRED: NORMAL
DLCO/VA: NORMAL
DLCO: NORMAL
EXPIRATORY TIME-POST: NORMAL
EXPIRATORY TIME: NORMAL
FEF 25-75% %CHNG: NORMAL
FEF 25-75% %PRED-POST: NORMAL
FEF 25-75% %PRED-PRE: NORMAL
FEF 25-75% PRED: NORMAL
FEF 25-75%-POST: NORMAL
FEF 25-75%-PRE: NORMAL
FEV1 %PRED-POST: 87 %
FEV1 %PRED-PRE: 88 %
FEV1 PRED: NORMAL
FEV1-POST: NORMAL
FEV1-PRE: NORMAL
FEV1/FVC %PRED-POST: NORMAL
FEV1/FVC %PRED-PRE: NORMAL
FEV1/FVC PRED: NORMAL
FEV1/FVC-POST: 74 %
FEV1/FVC-PRE: 77 %
FVC %PRED-POST: 97 L
FVC %PRED-PRE: 95 %
FVC PRED: NORMAL
FVC-POST: NORMAL
FVC-PRE: NORMAL
GAW %PRED: NORMAL
GAW PRED: NORMAL
GAW: NORMAL
IC %PRED: NORMAL
IC PRED: NORMAL
IC: NORMAL
MEP: NORMAL
MIP: NORMAL
MVV %PRED-PRE: NORMAL
MVV PRED: NORMAL
MVV-PRE: NORMAL
PEF %PRED-POST: NORMAL
PEF %PRED-PRE: NORMAL
PEF PRED: NORMAL
PEF%CHNG: NORMAL
PEF-POST: NORMAL
PEF-PRE: NORMAL
RAW %PRED: NORMAL
RAW PRED: NORMAL
RAW: NORMAL
RV %PRED: NORMAL
RV PRED: NORMAL
RV: NORMAL
SVC %PRED: NORMAL
SVC PRED: NORMAL
SVC: NORMAL
TLC %PRED: 92 %
TLC PRED: NORMAL
TLC: NORMAL
VA %PRED: NORMAL
VA PRED: NORMAL
VA: NORMAL
VTG %PRED: NORMAL
VTG PRED: NORMAL
VTG: NORMAL

## 2022-03-02 PROCEDURE — 94060 EVALUATION OF WHEEZING: CPT

## 2022-03-02 PROCEDURE — 94726 PLETHYSMOGRAPHY LUNG VOLUMES: CPT

## 2022-03-02 PROCEDURE — 94760 N-INVAS EAR/PLS OXIMETRY 1: CPT

## 2022-03-02 PROCEDURE — 94729 DIFFUSING CAPACITY: CPT

## 2022-03-02 PROCEDURE — 6370000000 HC RX 637 (ALT 250 FOR IP): Performed by: INTERNAL MEDICINE

## 2022-03-02 PROCEDURE — 71250 CT THORAX DX C-: CPT

## 2022-03-02 RX ORDER — ALBUTEROL SULFATE 90 UG/1
4 AEROSOL, METERED RESPIRATORY (INHALATION) ONCE
Status: COMPLETED | OUTPATIENT
Start: 2022-03-02 | End: 2022-03-02

## 2022-03-02 RX ADMIN — Medication 4 PUFF: at 07:51

## 2022-03-02 ASSESSMENT — PULMONARY FUNCTION TESTS
FVC_PERCENT_PREDICTED_PRE: 95
FEV1/FVC_PRE: 77
FEV1_PERCENT_PREDICTED_PRE: 88
FVC_PERCENT_PREDICTED_POST: 97
FEV1/FVC_POST: 74
FEV1_PERCENT_PREDICTED_POST: 87

## 2022-03-03 ENCOUNTER — OFFICE VISIT (OUTPATIENT)
Dept: PULMONOLOGY | Age: 37
End: 2022-03-03
Payer: COMMERCIAL

## 2022-03-03 VITALS
DIASTOLIC BLOOD PRESSURE: 81 MMHG | WEIGHT: 173.6 LBS | HEART RATE: 68 BPM | RESPIRATION RATE: 16 BRPM | HEIGHT: 63 IN | OXYGEN SATURATION: 99 % | BODY MASS INDEX: 30.76 KG/M2 | TEMPERATURE: 97.3 F | SYSTOLIC BLOOD PRESSURE: 121 MMHG

## 2022-03-03 DIAGNOSIS — Z86.16 PERSONAL HISTORY OF COVID-19: ICD-10-CM

## 2022-03-03 DIAGNOSIS — Q32.4 CONGENITAL BRONCHIAL ATRESIA: ICD-10-CM

## 2022-03-03 DIAGNOSIS — E66.9 CLASS 1 OBESITY WITH BODY MASS INDEX (BMI) OF 30.0 TO 30.9 IN ADULT, UNSPECIFIED OBESITY TYPE, UNSPECIFIED WHETHER SERIOUS COMORBIDITY PRESENT: ICD-10-CM

## 2022-03-03 DIAGNOSIS — J30.9 ALLERGIC RHINITIS, UNSPECIFIED SEASONALITY, UNSPECIFIED TRIGGER: Primary | ICD-10-CM

## 2022-03-03 PROCEDURE — G8417 CALC BMI ABV UP PARAM F/U: HCPCS | Performed by: INTERNAL MEDICINE

## 2022-03-03 PROCEDURE — 99213 OFFICE O/P EST LOW 20 MIN: CPT | Performed by: INTERNAL MEDICINE

## 2022-03-03 PROCEDURE — G8484 FLU IMMUNIZE NO ADMIN: HCPCS | Performed by: INTERNAL MEDICINE

## 2022-03-03 PROCEDURE — 1036F TOBACCO NON-USER: CPT | Performed by: INTERNAL MEDICINE

## 2022-03-03 PROCEDURE — G8427 DOCREV CUR MEDS BY ELIG CLIN: HCPCS | Performed by: INTERNAL MEDICINE

## 2022-03-03 ASSESSMENT — SLEEP AND FATIGUE QUESTIONNAIRES
HOW LIKELY ARE YOU TO NOD OFF OR FALL ASLEEP WHEN YOU ARE A PASSENGER IN A CAR FOR AN HOUR WITHOUT A BREAK: 1
HOW LIKELY ARE YOU TO NOD OFF OR FALL ASLEEP WHILE LYING DOWN TO REST IN THE AFTERNOON WHEN CIRCUMSTANCES PERMIT: 1
HOW LIKELY ARE YOU TO NOD OFF OR FALL ASLEEP WHILE WATCHING TV: 3
HOW LIKELY ARE YOU TO NOD OFF OR FALL ASLEEP IN A CAR, WHILE STOPPED FOR A FEW MINUTES IN TRAFFIC: 0
HOW LIKELY ARE YOU TO NOD OFF OR FALL ASLEEP WHILE SITTING AND READING: 3
HOW LIKELY ARE YOU TO NOD OFF OR FALL ASLEEP WHILE SITTING QUIETLY AFTER LUNCH WITHOUT ALCOHOL: 0
ESS TOTAL SCORE: 8
HOW LIKELY ARE YOU TO NOD OFF OR FALL ASLEEP WHILE SITTING AND TALKING TO SOMEONE: 0
HOW LIKELY ARE YOU TO NOD OFF OR FALL ASLEEP WHILE SITTING INACTIVE IN A PUBLIC PLACE: 0

## 2022-03-03 NOTE — PROCEDURES
[address] [address]                               PULMONARY FUNCTION    PATIENT NAME: Deloris PERKINS                  :        1985  MED REC NO:   [id number]                          ROOM:  ACCOUNT NO:   [de-identified]                           ADMIT DATE: 2022  PROVIDER:     Guillaume Hayes MD    DATE OF PROCEDURE:  2022    PFT INTERPRETATION    The patient is a 40-year-old female who underwent a PFT for shortness of  breath. Spirometry shows FVC to be 95%, FEV1 to be 88%, FEV1 to FVC  ratio was 92%, FEF25% to 75% was 76%. The patient did not have any  significant postbronchodilator improvement. Lung volume shows, the  total lung capacity was normal.  The patient does have some air  trapping. The patient also has decrease in ERV. The patient's  diffusion capacity when adjusted for volume was increased. The  patient's flow-volume loop was normal.  On the basis of this PFT, the  patient has no obstructive airway disease or any reactive airway  disease. The patient does not have any restrictive lung disease. The  patient does have some air trapping suggestive of any tendency towards  emphysema or asthma. Along with that the patient has some changes in  the PFT parameters because of body habitus. Please correlate  clinically.         Ana Luisa Asencio MD    D: 2022 16:51:18       T: 2022 16:53:43     SK/ANA [email or web address]  Job#: [id number]     Doc#: [id number]

## 2022-03-03 NOTE — PROGRESS NOTES
MA Communication: The following orders are received by verbal communication from Gisele Sandy MD    Orders include:     Follow up as needed

## 2022-03-03 NOTE — PROGRESS NOTES
Chief Complaint/Referring Provider:  Patient has come to the office for a pulmonary follow-up and to discuss the test results    Patient states that she had some headaches from the PFT patient states that she was given 4 puffs of albuterol inhaler 2 along with that patient states that she was having some sensation of feeling weird after the PFT after she blew in the computer, patient states that she feels tired and having no energy, patient does not have any increasing cough or expectoration, patient does not have any more than usual shortness of breath or wheezing, patient does not have any chest pain or palpitations, patient does not have any epistaxis or hemoptysis, patient does not have any significant abdominal symptoms of concern, no increasing leg edema, patient does not have any confusion lethargy, patient having some reflux symptoms but does not take any medications, patient has had some swelling of the hands and left foot, no other pertinent review of system of concern    Patient has been on Cleave Gent for epilepsy and the patient's last seizures but last year, patient has not used Keppra for last 1 month time because of logistics and patient supposed to follow-up with her neurologist at [address] [place] [address]     Previous HPI: Patient is a 70-year-old female who has come to the office for a pulmonary evaluation  Patient states that she has been having increasing shortness of breath for last few years time, patient states that about few years back she was evaluated by a pulmonologist in [place] and patient was told that her right side of the lung is not fully developed which is causing her to have shortness of breath, patient states that lately she has been having more shortness of breath, patient also was taking inhalers before but she does not have any prescription for that and has not been using them, patient also states that 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 causes her to have migraines also patient states that any cleaning agent like lactulose of bleach causes her to have coughing and choking and patient is making her kids do that job, patient states that if she walks up a flight of stairs or 2 he starts having more shortness of breath, patient also has occasional coughing with shortness of breath, patient also states that she had an episode of epistaxis yesterday but it is not a chronic pain, patient states that she has a electric base heating, patient has occasional wheezing along with the shortness of breath, patient does not have any significant expectoration, patient also has a  dull pain in the center of the chest with some radiation to the back, patient states that she sees a cardiologist and patient underwent various evaluations and was told that she is not getting enough blood and patient supposed to have a carotid Doppler in the near future, patient states that her stress test for the heart was okay but patient states that when she has this shortness of breath and chest pain she also has some dizziness, patient also states that on a flat surface at her own pace she can walk about 1 mile but climbing stairs or an incline is a different story, patient also has been having some swelling of the hands and feet along with that patient also is having symptoms of Raynaud's phenomena, patient states that she does not have any personal history of collagen vascular disorders that has been told to her, patient states that she is was adopted so she does not know her family history, patient has not been a smoker, patient states that she feels tired and having no energy in the daytime, patient states that she also has been waking up in the middle of the night on her home gasping for breath sometimes, patient states that she is anxious also patient states that she underwent her divorce last year which caused her to have more issues, patient also states that she will wears a smart watch and sometimes it gives her alarm that her oxygen saturations are less than 50% at nighttime, patient also states that she sold her house and moved to a apartment complex and the apartment she is living in has a lot of mold understanding and the landlord came to medicated but she is not sure if it is taken care of, patient states that she still has some shortness of 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, patient's out of her inhalers as stated before patient also states that she was recently diagnosed as having seizure disorders and patient was evaluated and patient was told that she has swelling of the frontal lobe and the etiology of that is not known, patient did have a COVID-19 infection in August 2021, patient also has had some pleuritic chest pain, patient does not give any chronic back issues per se, patient does not have any other pertinent review of system of concern    Past Medical History:   Diagnosis Date    ADHD (attention deficit hyperactivity disorder)     Allergic rhinitis 07/25/2019    Asthma     Class 1 obesity in adult 1/28/2022    COVID-19 08/03/2021    Kidney calculi     PALOMA (obstructive sleep apnea) 1/28/2022    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     Seizures ([facility] [address]        Past Surgical History:   Procedure Laterality Date    CHOLECYSTECTOMY      TUBAL LIGATION         Allergies   Allergen Reactions    Dye [Iodides] Other (See Comments)     Low HR    Sulfa Antibiotics Hives, Itching and Nausea Only    Vicodin [Hydrocodone-Acetaminophen] Other (See Comments)     Low HR     Codeine Palpitations       Medication list was reviewed and updated as needed in Epic    Social History     Socioeconomic History    Marital status:      Spouse name: Not on file    Number of children: Not on file    Years of education: Not on file    Highest education level: Not on file   Occupational History    Not on file   Tobacco Use    Smoking status: Never Smoker    Smokeless tobacco: Never Used   Vaping Use    Vaping Use: Never used   Substance and Sexual Activity    Alcohol use: No    Drug use: No    Sexual activity: Not Currently   Other Topics Concern    Not on file   Social History Narrative    Not on file     Social Determinants of Health     Financial Resource Strain: Low Risk     Difficulty of Paying Living Expenses: Not hard at all   Food Insecurity: No Food Insecurity    Worried About Running Out of Food in the Last Year: Never true    Ulysses of Food in the Last Year: Never true   Transportation Needs: No Transportation Needs    Lack of Transportation (Medical): No    Lack of Transportation (Non-Medical):  No   Physical Activity:     Days of Exercise per Week: Not on file    Minutes of Exercise per Session: Not on file   Stress:     Feeling of Stress : Not on file   Social Connections:     Frequency of Communication with Friends and Family: Not on file    Frequency of Social Gatherings with Friends and Family: Not on file    Attends Latter-day Services: Not on file    Active Member of [address] [place] [address] or Organizations: Not on file    Attends Club or Organization Meetings: Not on file    Marital Status: Not on file   Intimate Partner Violence:     Fear of Current or Ex-Partner: Not on file    Emotionally Abused: Not on file    Physically Abused: Not on file    Sexually Abused: Not on file   Housing Stability: Unknown    Unable to Pay for Housing in the Last Year: No    Number of [place] in the Last Year: Not on file    Unstable Housing in the Last Year: No       Family History   Problem Relation Age of Onset    Other Mother         Hypoglycemia    Other Maternal Grandmother         Thyroid issues     Cancer Maternal Grandmother         Bone cancer     Diabetes Maternal Grandmother     Cancer Maternal Grandfather         Bone cancer     Diabetes Sister             Review of Systems same as above    Physical Exam:  Blood pressure 121/81, pulse 68, temperature 97.3 °F (36.3 °C), temperature source Temporal, resp. rate 16, height 5' 3\" (1.6 m), weight 173 lb 9.6 oz (78.7 kg), SpO2 99 %, not currently breastfeeding.'  Constitutional:  No acute distress. HENT:  Oropharynx is clear and moist. No thyromegaly. Eyes:  Conjunctivae arenormal. Pupils equal, round, and reactive to light. No scleral icterus. Neck: . No tracheal deviation present. No obvious thyroid mass. Cardiovascular:Normal rate, regular rhythm, normal heart sounds. No right ventricular heave. Nolower extremity edema. Pulmonary/Chest: No wheezes. No rales. Chest wall is not dull to percussion. Noaccessory muscle usage or stridor. Decreased breath sound intensity  Abdominal: Soft. Bowel sounds present. No distension or hernia. Notenderness. Musculoskeletal: No cyanosis. No clubbing. No obvious joint deformity. Lymphadenopathy: No cervical or supraclavicular adenopathy. Skin: Skin is warm and dry. No rash or nodules on the exposed extremities. Psychiatric: Normal mood and affect. Behavior is normal.  No anxiety. Neurologic: Alert, awake and oriented. PERRL. Speech fluent      Sleep Medicine Data:  Sitting and reading: High chance of dozing  Watching TV: High chance of dozing  Sitting, inactive in a public place (e.g. a theatre or a meeting): Would never doze  As a passenger in a car for an hour without a break: Slight chance of dozing  Lying down to rest in the afternoon when circumstances permit: Slight chance of dozing  Sitting and talking to someone: Would never doze  Sitting quietly after a lunch without alcohol: Would never doze  In a car, while stopped for a few minutes in traffic: Would never doze  Total score: 8       Data:     Imaging:  I have reviewed radiology images personally. No orders to display     CT chest from [facility] everywhere-2013-No pulmonary emboli. Bronchial atresia involving left upper lobe bronchus with some associated   peripheral mucoceles and hyperlucent left upper lobe. Otherwise unremarkable CT angiogram of   the chest.    CT OF THE CHEST WITHOUT CONTRAST 3/2/2022 8:49 am       TECHNIQUE:   CT of the chest was performed without the administration of intravenous   contrast. Multiplanar reformatted images are provided for review. Dose   modulation, iterative reconstruction, and/or weight based adjustment of the   mA/kV was utilized to reduce the radiation dose to as low as reasonably   achievable.       COMPARISON:   No prior chest CT for comparison. Carole Bible made to prior abdominal CT   dated 6/12/2010.       HISTORY:   ORDERING SYSTEM PROVIDED HISTORY: SOB (shortness of breath)   TECHNOLOGIST PROVIDED HISTORY:   Please call Pre registration # [phone] prior to the test date. Please arrive  30 minutes before the procedure unless otherwise instructed. Is the patient pregnant?->No   Reason for Exam: sob and cough several months, non smoker no surg       FINDINGS:   Mediastinum: The thyroid is unremarkable. Jairo Spatz is no pathologic   lymphadenopathy.  The heart, pericardium, and intrathoracic great vessels are   unremarkable in noncontrast appearance.       Lungs/pleura: There are multiple curvilinear branching mucoceles within the   central left upper lobe, with surrounding pulmonary hyperlucency, consistent   with left upper lobe bronchial atresia.  The remainder of the central airways   are patent.  There is minimal biapical pleural and parenchymal scarring.    There is mild pleural-parenchymal scarring within the right middle lobe,   lingula, and left lower lobe.  The lungs are otherwise clear, without   evidence of acute airspace consolidation.  There is no evidence of a   pneumothorax or pleural effusion. Jairo Spatz are two stable subcentimeter   noncalcified nodules within the subpleural aspect of the left lower lobe,   unchanged from 6/12/2010, consistent with benign granulomatous disease given   their long-term stability.  There is a calcified granuloma within the right middle lobe.  No suspicious pulmonary nodule or mass is identified.       Upper Abdomen: The adrenal glands are unremarkable bilaterally.  The patient   is status post cholecystectomy.  The remainder of the upper abdomen is   unremarkable.       Soft Tissues/Bones: No acute findings.           Impression   1. No acute intrapulmonary findings. 2. Left upper lobe bronchial atresia. HST-No PALOMA    PFT INTERPRETATION     The patient is a 42-year-old female who underwent a PFT for shortness of  breath. Spirometry shows FVC to be 95%, FEV1 to be 88%, FEV1 to FVC  ratio was 92%, FEF25% to 75% was 76%. The patient did not have any  significant postbronchodilator improvement. Lung volume shows, the  total lung capacity was normal.  The patient does have some air  trapping. The patient also has decrease in ERV. The patient's  diffusion capacity when adjusted for volume was increased. The  patient's flow-volume loop was normal.  On the basis of this PFT, the  patient has no obstructive airway disease or any reactive airway  disease. The patient does not have any restrictive lung disease. The  patient does have some air trapping suggestive of any tendency towards  emphysema or asthma. Along with that the patient has some changes in  the PFT parameters because of body habitus    Assessment:    1. SOB (shortness of breath)      2. Congenital bronchial atresia        3. . Personal history of COVID-19    4.  Class 1  Obesity          Plan:   · Patient's review of system were discussed  · Patient was told about the clinical findings during auscultation and implications  · Patient was shown the CT of the chest along with findings and interpretation and patient has a left upper lobe atresia but does not have any other pathology of concern  · Patient's PFT results were reviewed and patient does not have any significant obstructive restrictive lung disease of concern  · Patient was told about the pathophysiology of the disease process and its modifying factors  · Patient does not have any PALOMA on the sleep study  · Patient was told about diet and lifestyle modifications  · Patient will benefit from weight loss  · A regular regimented exercise will help  · A humidifier in the bedroom will help  · Steam inhalation will help  · Patient's prescription for albuterol inhaler 2 puffs every 6 on whenever necessary basis can be used  · No need for any Qvar or any long-acting inhaler  · Patient was told that it is not the right thing to do to skip her anticonvulsants if she has seizure disorder and needs to do the needful ASAP  · Patient can discuss with PCP about medication for reflux  · Patient to follow-up on PRN basis

## 2022-03-03 NOTE — PATIENT INSTRUCTIONS
Remember to bring a list of pulmonary medications and any CPAP or BiPAP machines to your next appointment with the office. Please keep all of your future appointments scheduled by [address] office. Out of respect for other patients and providers, you may be asked to reschedule your appointment if you arrive later than your scheduled appointment time. Appointments cancelled less than 24hrs in advance will be considered a no show. Patients with three missed appointments within 1 year or four missed appointments within 2 years can be dismissed from the practice. Please be aware that our physicians are required to work in the Intensive Care Unit at [facility].  Your appointment may need to be rescheduled if they are designated to work during your appointment time. You may receive a survey regarding the care you received during your visit. Your input is valuable to us. We encourage you to complete and return your survey. We hope you will choose us in the future for your healthcare needs. Pt instructed of all future appointment dates & times, including radiology, labs, procedures & referrals. If procedures were scheduled preparation instructions provided. Instructions on future appointments with [facility] were given.

## 2022-07-01 ENCOUNTER — OFFICE VISIT (OUTPATIENT)
Dept: FAMILY MEDICINE CLINIC | Age: 37
End: 2022-07-01
Payer: COMMERCIAL

## 2022-07-01 VITALS
RESPIRATION RATE: 16 BRPM | SYSTOLIC BLOOD PRESSURE: 132 MMHG | OXYGEN SATURATION: 98 % | TEMPERATURE: 97.1 F | HEART RATE: 84 BPM | DIASTOLIC BLOOD PRESSURE: 84 MMHG | BODY MASS INDEX: 30.47 KG/M2 | WEIGHT: 172 LBS

## 2022-07-01 DIAGNOSIS — L03.116 CELLULITIS OF LEFT LOWER EXTREMITY: ICD-10-CM

## 2022-07-01 DIAGNOSIS — K42.9 UMBILICAL HERNIA WITHOUT OBSTRUCTION AND WITHOUT GANGRENE: Primary | ICD-10-CM

## 2022-07-01 DIAGNOSIS — K21.9 GASTROESOPHAGEAL REFLUX DISEASE, UNSPECIFIED WHETHER ESOPHAGITIS PRESENT: ICD-10-CM

## 2022-07-01 PROCEDURE — G8427 DOCREV CUR MEDS BY ELIG CLIN: HCPCS | Performed by: NURSE PRACTITIONER

## 2022-07-01 PROCEDURE — G8417 CALC BMI ABV UP PARAM F/U: HCPCS | Performed by: NURSE PRACTITIONER

## 2022-07-01 PROCEDURE — 99214 OFFICE O/P EST MOD 30 MIN: CPT | Performed by: NURSE PRACTITIONER

## 2022-07-01 PROCEDURE — 1036F TOBACCO NON-USER: CPT | Performed by: NURSE PRACTITIONER

## 2022-07-01 RX ORDER — CEPHALEXIN 500 MG/1
500 CAPSULE ORAL 2 TIMES DAILY
Qty: 14 CAPSULE | Refills: 0 | Status: SHIPPED | OUTPATIENT
Start: 2022-07-01 | End: 2022-07-08

## 2022-07-01 RX ORDER — OMEPRAZOLE 40 MG/1
40 CAPSULE, DELAYED RELEASE ORAL
Qty: 30 CAPSULE | Refills: 5 | Status: SHIPPED | OUTPATIENT
Start: 2022-07-01

## 2022-07-01 RX ORDER — IBUPROFEN 800 MG/1
800 TABLET ORAL DAILY PRN
Qty: 90 TABLET | Refills: 0 | Status: SHIPPED | OUTPATIENT
Start: 2022-07-01

## 2022-07-01 SDOH — ECONOMIC STABILITY: FOOD INSECURITY: WITHIN THE PAST 12 MONTHS, THE FOOD YOU BOUGHT JUST DIDN'T LAST AND YOU DIDN'T HAVE MONEY TO GET MORE.: NEVER TRUE

## 2022-07-01 SDOH — ECONOMIC STABILITY: INCOME INSECURITY: IN THE LAST 12 MONTHS, WAS THERE A TIME WHEN YOU WERE NOT ABLE TO PAY THE MORTGAGE OR RENT ON TIME?: NO

## 2022-07-01 SDOH — ECONOMIC STABILITY: FOOD INSECURITY: WITHIN THE PAST 12 MONTHS, YOU WORRIED THAT YOUR FOOD WOULD RUN OUT BEFORE YOU GOT MONEY TO BUY MORE.: NEVER TRUE

## 2022-07-01 ASSESSMENT — PATIENT HEALTH QUESTIONNAIRE - PHQ9
10. IF YOU CHECKED OFF ANY PROBLEMS, HOW DIFFICULT HAVE THESE PROBLEMS MADE IT FOR YOU TO DO YOUR WORK, TAKE CARE OF THINGS AT HOME, OR GET ALONG WITH OTHER PEOPLE: 1
5. POOR APPETITE OR OVEREATING: 1
9. THOUGHTS THAT YOU WOULD BE BETTER OFF DEAD, OR OF HURTING YOURSELF: 0
SUM OF ALL RESPONSES TO PHQ9 QUESTIONS 1 & 2: 3
8. MOVING OR SPEAKING SO SLOWLY THAT OTHER PEOPLE COULD HAVE NOTICED. OR THE OPPOSITE, BEING SO FIGETY OR RESTLESS THAT YOU HAVE BEEN MOVING AROUND A LOT MORE THAN USUAL: 1
4. FEELING TIRED OR HAVING LITTLE ENERGY: 3
DEPRESSION UNABLE TO ASSESS: FUNCTIONAL CAPACITY MOTIVATION LIMITS ACCURACY
7. TROUBLE CONCENTRATING ON THINGS, SUCH AS READING THE NEWSPAPER OR WATCHING TELEVISION: 2
SUM OF ALL RESPONSES TO PHQ QUESTIONS 1-9: 13
2. FEELING DOWN, DEPRESSED OR HOPELESS: 3
SUM OF ALL RESPONSES TO PHQ QUESTIONS 1-9: 13
3. TROUBLE FALLING OR STAYING ASLEEP: 2
SUM OF ALL RESPONSES TO PHQ QUESTIONS 1-9: 13
1. LITTLE INTEREST OR PLEASURE IN DOING THINGS: 0
SUM OF ALL RESPONSES TO PHQ QUESTIONS 1-9: 13
6. FEELING BAD ABOUT YOURSELF - OR THAT YOU ARE A FAILURE OR HAVE LET YOURSELF OR YOUR FAMILY DOWN: 1

## 2022-07-01 ASSESSMENT — ANXIETY QUESTIONNAIRES
GAD7 TOTAL SCORE: 0
4. TROUBLE RELAXING: 0
1. FEELING NERVOUS, ANXIOUS, OR ON EDGE: 0
7. FEELING AFRAID AS IF SOMETHING AWFUL MIGHT HAPPEN: 0
3. WORRYING TOO MUCH ABOUT DIFFERENT THINGS: 0
2. NOT BEING ABLE TO STOP OR CONTROL WORRYING: 0
IF YOU CHECKED OFF ANY PROBLEMS ON THIS QUESTIONNAIRE, HOW DIFFICULT HAVE THESE PROBLEMS MADE IT FOR YOU TO DO YOUR WORK, TAKE CARE OF THINGS AT HOME, OR GET ALONG WITH OTHER PEOPLE: NOT DIFFICULT AT ALL
5. BEING SO RESTLESS THAT IT IS HARD TO SIT STILL: 0
6. BECOMING EASILY ANNOYED OR IRRITABLE: 0

## 2022-07-01 ASSESSMENT — ENCOUNTER SYMPTOMS
DIARRHEA: 0
BLOOD IN STOOL: 0
ABDOMINAL DISTENTION: 1
NAUSEA: 1
COLOR CHANGE: 1
VOMITING: 0
RECTAL PAIN: 0
CONSTIPATION: 0

## 2022-07-01 ASSESSMENT — SOCIAL DETERMINANTS OF HEALTH (SDOH): HOW HARD IS IT FOR YOU TO PAY FOR THE VERY BASICS LIKE FOOD, HOUSING, MEDICAL CARE, AND HEATING?: NOT HARD AT ALL
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
## 2022-07-01 NOTE — PATIENT INSTRUCTIONS
Start omeprazole  Small meal sizes  [place] diet  Make appointment with surgeon   Start antibiotic for skin infection on the leg

## 2022-07-11 ENCOUNTER — INITIAL CONSULT (OUTPATIENT)
Dept: SURGERY | Age: 37
End: 2022-07-11
Payer: COMMERCIAL

## 2022-07-11 VITALS
HEIGHT: 63 IN | SYSTOLIC BLOOD PRESSURE: 125 MMHG | BODY MASS INDEX: 30.48 KG/M2 | HEART RATE: 75 BPM | DIASTOLIC BLOOD PRESSURE: 67 MMHG | WEIGHT: 172 LBS

## 2022-07-11 DIAGNOSIS — R10.84 GENERALIZED ABDOMINAL PAIN: Primary | ICD-10-CM

## 2022-07-11 PROCEDURE — 99203 OFFICE O/P NEW LOW 30 MIN: CPT | Performed by: SURGERY

## 2022-07-11 PROCEDURE — G8417 CALC BMI ABV UP PARAM F/U: HCPCS | Performed by: SURGERY

## 2022-07-11 PROCEDURE — G8427 DOCREV CUR MEDS BY ELIG CLIN: HCPCS | Performed by: SURGERY

## 2022-07-13 ENCOUNTER — HOSPITAL ENCOUNTER (OUTPATIENT)
Dept: CT IMAGING | Age: 37
Discharge: HOME | End: 2022-07-13
Payer: COMMERCIAL

## 2022-07-13 DIAGNOSIS — R10.84 GENERALIZED ABDOMINAL PAIN: ICD-10-CM

## 2022-07-13 PROCEDURE — 74176 CT ABD & PELVIS W/O CONTRAST: CPT

## 2022-07-15 NOTE — PROGRESS NOTES
Department of General Surgery Consult    PATIENT NAME: Ct Trotter   YOB: 1985    ADMISSION DATE: No admission date for patient encounter. TODAY'S DATE: 7/11/22    Reason for Consult:  umbilical hernia    Chief Complaint: tender    Requesting Physician:  Darnell Funes    HISTORY OF PRESENT ILLNESS:              The patient is a 40 y.o. female who presents with tenderness and feeling a bulge near umbilicus. Worse the past month. Worse with activity. No nausea. No constipation. .    Past Medical History:        Diagnosis Date    ADHD (attention deficit hyperactivity disorder)     Allergic rhinitis 07/25/2019    Asthma     Class 1 obesity in adult 1/28/2022    COVID-19 08/03/2021    Kidney calculi     PALOMA (obstructive sleep apnea) 1/28/2022    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     Seizures ([facility] [address]        Past Surgical History:        Procedure Laterality Date    CHOLECYSTECTOMY      TUBAL LIGATION         Current Medications:   No current facility-administered medications for this visit. Prior to Admission medications    Medication Sig Start Date End Date Taking?  Authorizing Provider   ibuprofen (ADVIL;MOTRIN) 800 MG tablet Take 1 tablet by mouth daily as needed for Pain (prn) 7/1/22  Yes AMBER Patricio CNP   omeprazole (PRILOSEC) 40 MG delayed release capsule Take 1 capsule by mouth every morning (before breakfast) 7/1/22  Yes AMBER Patricio CNP   albuterol sulfate HFA (VENTOLIN HFA) 108 (90 Base) MCG/ACT inhaler Inhale 2 puffs into the lungs every 6 hours as needed for Wheezing or Shortness of Breath 1/28/22  Yes Marielle Bowen MD   levETIRAcetam (KEPPRA) 500 MG tablet Take 1 tablet by mouth daily 1/5/22  Yes Rowan Darnell DO        Allergies:  Dye [iodides], Sulfa antibiotics, Vicodin [hydrocodone-acetaminophen], and Codeine    Social History:   Social History     Socioeconomic History    Marital status:      Spouse name: Not on file    Number of children: Not on file    Years of education: Not on file    Highest education level: Not on file   Occupational History    Not on file   Tobacco Use    Smoking status: Never    Smokeless tobacco: Never   Vaping Use    Vaping Use: Never used   Substance and Sexual Activity    Alcohol use: No    Drug use: No    Sexual activity: Not Currently   Other Topics Concern    Not on file   Social History Narrative    Not on file     Social Determinants of Health     Financial Resource Strain: Low Risk     Difficulty of Paying Living Expenses: Not hard at all   Food Insecurity: No Food Insecurity    Worried About [address] in the Last Year: Never true    [address] in the Last Year: Never true   Transportation Needs: No Transportation Needs    Lack of Transportation (Medical): No    Lack of Transportation (Non-Medical): No   Physical Activity: Not on file   Stress: Not on file   Social Connections: Not on file   Intimate Partner Violence: Not on file   Housing Stability: Unknown    Unable to Pay for Housing in the Last Year: No    Number of Places Lived in the Last Year: Not on file    Unstable Housing in the Last Year: No         Family History:        Problem Relation Age of Onset    Other Mother         Hypoglycemia    Other Maternal Grandmother         Thyroid issues     Cancer Maternal Grandmother         Bone cancer     Diabetes Maternal Grandmother     Cancer Maternal Grandfather         Bone cancer     Diabetes Sister        REVIEW OF SYSTEMS:  CONSTITUTIONAL:  negative  HEENT:  negative  RESPIRATORY:  negative  CARDIOVASCULAR:  negative  GASTROINTESTINAL:  negative except for abdominal pain  GENITOURINARY:  negative  HEMATOLOGIC/LYMPHATIC:  negative  NEUROLOGICAL:  Negative  * All other ROS reviewed and negative.        PHYSICAL EXAM:  VITALS:  /67   Pulse 75   Ht 5' 3\" (1.6 m)   Wt 172 lb (78 kg)   BMI 30.47 kg/m²   24HR INTAKE/OUTPUT: [unfilled]  @[facility]@      CONSTITUTIONAL:  alert, no apparent distress and mildly obese  EYES:  PERRL, sclera clear  ENT:  Normocephalic,atraumatic, without obvious abnormality  NECK:  supple, symmetrical, trachea midline  LUNGS: Resp effort easy and unlabored, no crackles or wheezing  CARDIOVASCULAR:  NO JVD, regular rate  ABDOMEN:  , normal bowel sounds, soft, non-distended, minimal tender near umbilicus  MUSCULOSKELETAL: No clubbing or cyanosis, 0+ pitting edema lower extremities  NEUROLOGIC:  Mental Status Exam:  Level of Alertness:   awake  PSYCHIATRIC:   person, place, time  SKIN:  no bruising or bleeding    DATA:    CBC: No results for input(s): WBC, HGB, HCT, PLT in the last 72 hours. BMP:  No results for input(s): NA, K, CL, CO2, BUN, CREATININE, GLUCOSE in the last 72 hours. Hepatic: No results for input(s): AST, ALT, ALB, BILITOT, ALKPHOS in the last 72 hours. Mag:    No results for input(s): MG in the last 72 hours. Phos:   No results for input(s): PHOS in the last 72 hours. INR: No results for input(s): INR in the last 72 hours. Radiology Review: Images personally reviewed by me.    NA      IMPRESSION/RECOMMENDATIONS:    39 yo with pain and bulge felt near umbilicus   CT to better assess if hernia is present    Electronically signed by Susanna Landon MD     [address]

## 2022-07-18 ENCOUNTER — TELEPHONE (OUTPATIENT)
Dept: FAMILY MEDICINE CLINIC | Age: 37
End: 2022-07-18

## 2022-07-18 ENCOUNTER — TELEPHONE (OUTPATIENT)
Dept: SURGERY | Age: 37
End: 2022-07-18

## 2022-07-18 NOTE — TELEPHONE ENCOUNTER
----- Message from Teto Skyler sent at 7/18/2022  8:20 AM EDT -----  Subject: Results Request    QUESTIONS  Results: CT Scan; Ordered by: Nilad Bedolla   Date Performed: 2022-07-13  ---------------------------------------------------------------------------  --------------  Temitope Harrell INFO    [phone]; OK to leave message on voicemail  ---------------------------------------------------------------------------  --------------

## 2022-07-18 NOTE — TELEPHONE ENCOUNTER
Pt called and said she had a CT Scan on 7/13 per Dr Ceferino Irvin and would like to know the results and see what the next steps are going to be. Please call her and thank you!

## 2022-07-18 NOTE — TELEPHONE ENCOUNTER
Per Dr. Christofer Cabrera- CT normal, no hernia. Patient notified of test results and to call the office with any further questions, verbally states she understands.

## 2022-11-10 ENCOUNTER — TELEPHONE (OUTPATIENT)
Dept: FAMILY MEDICINE CLINIC | Age: 37
End: 2022-11-10

## 2022-11-10 DIAGNOSIS — R56.9 SEIZURES (HCC): ICD-10-CM

## 2022-11-10 DIAGNOSIS — R53.83 FATIGUE, UNSPECIFIED TYPE: Primary | ICD-10-CM

## 2022-11-10 NOTE — TELEPHONE ENCOUNTER
Bettie Mosqueda scheduled physical on Future Appointments  11/17/2022 3:00 PM    Valerie Smith, DO    Do you want to order labs for patient to get done prior to appointment?

## 2022-11-10 NOTE — TELEPHONE ENCOUNTER
Please let patient know that I have ordered fasting labs for her to have done before her visit.   Thank you

## 2022-11-14 ENCOUNTER — HOSPITAL ENCOUNTER (OUTPATIENT)
Dept: GENERAL RADIOLOGY | Age: 37
Discharge: HOME | End: 2022-11-14
Payer: COMMERCIAL

## 2022-11-14 ENCOUNTER — TELEPHONE (OUTPATIENT)
Dept: FAMILY MEDICINE CLINIC | Age: 37
End: 2022-11-14

## 2022-11-14 ENCOUNTER — OFFICE VISIT (OUTPATIENT)
Dept: ORTHOPEDIC SURGERY | Age: 37
End: 2022-11-14
Payer: COMMERCIAL

## 2022-11-14 DIAGNOSIS — M25.562 ACUTE PAIN OF LEFT KNEE: Primary | ICD-10-CM

## 2022-11-14 DIAGNOSIS — M25.562 LEFT KNEE PAIN, UNSPECIFIED CHRONICITY: Primary | ICD-10-CM

## 2022-11-14 DIAGNOSIS — R56.9 SEIZURES (HCC): ICD-10-CM

## 2022-11-14 DIAGNOSIS — M25.562 LEFT KNEE PAIN, UNSPECIFIED CHRONICITY: ICD-10-CM

## 2022-11-14 DIAGNOSIS — R53.83 FATIGUE, UNSPECIFIED TYPE: ICD-10-CM

## 2022-11-14 LAB
A/G RATIO: 1.8 (ref 1.1–2.2)
ALBUMIN SERPL-MCNC: 4.4 G/DL (ref 3.4–5)
ALP BLD-CCNC: 85 U/L (ref 40–129)
ALT SERPL-CCNC: 24 U/L (ref 10–40)
ANION GAP SERPL CALCULATED.3IONS-SCNC: 7 MMOL/L (ref 3–16)
AST SERPL-CCNC: 24 U/L (ref 15–37)
BASOPHILS ABSOLUTE: 0 K/UL (ref 0–0.2)
BASOPHILS RELATIVE PERCENT: 1 %
BILIRUB SERPL-MCNC: 0.4 MG/DL (ref 0–1)
BUN BLDV-MCNC: 12 MG/DL (ref 7–20)
CALCIUM SERPL-MCNC: 9.7 MG/DL (ref 8.3–10.6)
CHLORIDE BLD-SCNC: 107 MMOL/L (ref 99–110)
CHOLESTEROL, TOTAL: 182 MG/DL (ref 0–199)
CO2: 26 MMOL/L (ref 21–32)
CREAT SERPL-MCNC: 0.6 MG/DL (ref 0.6–1.1)
EOSINOPHILS ABSOLUTE: 0.1 K/UL (ref 0–0.6)
EOSINOPHILS RELATIVE PERCENT: 2.5 %
GFR SERPL CREATININE-BSD FRML MDRD: >60 ML/MIN/{1.73_M2}
GLUCOSE BLD-MCNC: 90 MG/DL (ref 70–99)
HCT VFR BLD CALC: 43 % (ref 36–48)
HDLC SERPL-MCNC: 35 MG/DL (ref 40–60)
HEMOGLOBIN: 14.8 G/DL (ref 12–16)
KEPPRA DOSE AMT: ABNORMAL
KEPPRA: <2 UG/ML (ref 6–46)
LDL CHOLESTEROL CALCULATED: 121 MG/DL
LYMPHOCYTES ABSOLUTE: 1.2 K/UL (ref 1–5.1)
LYMPHOCYTES RELATIVE PERCENT: 30.6 %
MCH RBC QN AUTO: 30.7 PG (ref 26–34)
MCHC RBC AUTO-ENTMCNC: 34.4 G/DL (ref 31–36)
MCV RBC AUTO: 89.5 FL (ref 80–100)
MONOCYTES ABSOLUTE: 0.4 K/UL (ref 0–1.3)
MONOCYTES RELATIVE PERCENT: 10.1 %
NEUTROPHILS ABSOLUTE: 2.1 K/UL (ref 1.7–7.7)
NEUTROPHILS RELATIVE PERCENT: 55.8 %
PDW BLD-RTO: 12.7 % (ref 12.4–15.4)
PLATELET # BLD: 273 K/UL (ref 135–450)
PMV BLD AUTO: 7.6 FL (ref 5–10.5)
POTASSIUM SERPL-SCNC: 4.8 MMOL/L (ref 3.5–5.1)
RBC # BLD: 4.8 M/UL (ref 4–5.2)
SODIUM BLD-SCNC: 140 MMOL/L (ref 136–145)
T4 FREE: 0.9 NG/DL (ref 0.9–1.8)
TOTAL PROTEIN: 6.9 G/DL (ref 6.4–8.2)
TRIGL SERPL-MCNC: 131 MG/DL (ref 0–150)
TSH SERPL DL<=0.05 MIU/L-ACNC: 2.02 UIU/ML (ref 0.27–4.2)
VLDLC SERPL CALC-MCNC: 26 MG/DL
WBC # BLD: 3.8 K/UL (ref 4–11)

## 2022-11-14 PROCEDURE — G8417 CALC BMI ABV UP PARAM F/U: HCPCS | Performed by: STUDENT IN AN ORGANIZED HEALTH CARE EDUCATION/TRAINING PROGRAM

## 2022-11-14 PROCEDURE — G8427 DOCREV CUR MEDS BY ELIG CLIN: HCPCS | Performed by: STUDENT IN AN ORGANIZED HEALTH CARE EDUCATION/TRAINING PROGRAM

## 2022-11-14 PROCEDURE — 73564 X-RAY EXAM KNEE 4 OR MORE: CPT

## 2022-11-14 PROCEDURE — G8484 FLU IMMUNIZE NO ADMIN: HCPCS | Performed by: STUDENT IN AN ORGANIZED HEALTH CARE EDUCATION/TRAINING PROGRAM

## 2022-11-14 PROCEDURE — 99204 OFFICE O/P NEW MOD 45 MIN: CPT | Performed by: STUDENT IN AN ORGANIZED HEALTH CARE EDUCATION/TRAINING PROGRAM

## 2022-11-14 PROCEDURE — 73562 X-RAY EXAM OF KNEE 3: CPT

## 2022-11-14 PROCEDURE — 1036F TOBACCO NON-USER: CPT | Performed by: STUDENT IN AN ORGANIZED HEALTH CARE EDUCATION/TRAINING PROGRAM

## 2022-11-14 RX ORDER — DICLOFENAC SODIUM 75 MG/1
75 TABLET, DELAYED RELEASE ORAL 2 TIMES DAILY WITH MEALS
Qty: 60 TABLET | Refills: 0 | Status: SHIPPED | OUTPATIENT
Start: 2022-11-14 | End: 2022-12-14

## 2022-11-14 NOTE — PROGRESS NOTES
Chief Complaint   Patient presents with    Knee Pain     L knee pain since Thursday. Has been icing and doing epsom salt baths. No known injury, did fall on Thursday but doesn't remember it hurting. HPI:      Cecil Rojas is a 40 y.o. female who presents for evaluation of left knee pain. She had a fall on Thursday after tripping on one of her kids toys. She doesn't recall it hurting right after it, but the pain did start later that day. She has been taking naproxen and icing it regularly since but she reports the pain is only worsening. She has pain with twisting, walking up or down stairs, and normal walking. She has not had any previous injury to the knee. Past Medical History:   Diagnosis Date    ADHD (attention deficit hyperactivity disorder)     Allergic rhinitis 07/25/2019    Asthma     Class 1 obesity in adult 1/28/2022    COVID-19 08/03/2021    Kidney calculi     PALOMA (obstructive sleep apnea) 1/28/2022    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     Seizures (HCC)        Medications and allergies were reviewed as necessary. Review of Systems:  Pertinent items are noted in HPI. Physical Examination: This is a pleasant female, alert, and in no acute distress. There were no vitals filed for this visit. Left knee exam: Lateral joint line tenderness and patellar tenderness. Pain is exacerbated with resisted knee extension and flexion. Pain is exacerbated with patellar grind. Indeterminant Lachman and positive Jeniffer (lateral pain) and Thessaly. Vascular exam: Extremities warm and well perfused, no significant edema    Respiratory exam: Breathing easy and unlabored    Neuro exam: No focal neuro deficits    Lymphatic: No obvious lymphadenopathy    Skin: Warm, dry    Radiology:     4 view X-rays of the bilateral knees dated 11/14/2022 were reviewed and interpreted independently today and discussed with the patient. The films revealed: no acute osseous abnormalities.      Assessment and Plan:     1. Acute pain of left knee  She had sudden onset left knee pain which started after a fall on Thursday. She reports the pain has been worsening since then and she is walking with a visible limp. Her exam today is concerning for possible meniscus tear so would like to get a MRI to evaluate this further. Also put her on a prescription strength anti-inflammatory advised her not to take any other NSAIDs while on this medication. - diclofenac (VOLTAREN) 75 MG EC tablet; Take 1 tablet by mouth 2 times daily (with meals)  Dispense: 60 tablet; Refill: 0  - MRI KNEE LEFT WO CONTRAST; Future    Follow-up: after MRI. Sooner with any problems, questions, concerns, or worsening symptoms. Rupesh Brady MD  Primary Care Sports Medicine    Please note that this chart was at least partially generated using dragon dictation software. Although every effort was made to ensure the accuracy of this automated transcription, some errors in transcription may have occurred.

## 2022-11-17 ENCOUNTER — OFFICE VISIT (OUTPATIENT)
Dept: FAMILY MEDICINE CLINIC | Age: 37
End: 2022-11-17
Payer: COMMERCIAL

## 2022-11-17 VITALS
BODY MASS INDEX: 31.43 KG/M2 | HEIGHT: 63 IN | RESPIRATION RATE: 16 BRPM | TEMPERATURE: 97.2 F | HEART RATE: 75 BPM | DIASTOLIC BLOOD PRESSURE: 88 MMHG | WEIGHT: 177.4 LBS | SYSTOLIC BLOOD PRESSURE: 129 MMHG

## 2022-11-17 DIAGNOSIS — Z00.00 ANNUAL PHYSICAL EXAM: Primary | ICD-10-CM

## 2022-11-17 DIAGNOSIS — D72.819 LEUKOPENIA, UNSPECIFIED TYPE: ICD-10-CM

## 2022-11-17 DIAGNOSIS — R07.9 CHEST PAIN, UNSPECIFIED TYPE: ICD-10-CM

## 2022-11-17 DIAGNOSIS — R56.9 SEIZURES (HCC): ICD-10-CM

## 2022-11-17 DIAGNOSIS — R42 EPISODIC LIGHTHEADEDNESS: ICD-10-CM

## 2022-11-17 PROCEDURE — G8484 FLU IMMUNIZE NO ADMIN: HCPCS | Performed by: FAMILY MEDICINE

## 2022-11-17 PROCEDURE — 99395 PREV VISIT EST AGE 18-39: CPT | Performed by: FAMILY MEDICINE

## 2022-11-17 ASSESSMENT — PATIENT HEALTH QUESTIONNAIRE - PHQ9
SUM OF ALL RESPONSES TO PHQ QUESTIONS 1-9: 11
10. IF YOU CHECKED OFF ANY PROBLEMS, HOW DIFFICULT HAVE THESE PROBLEMS MADE IT FOR YOU TO DO YOUR WORK, TAKE CARE OF THINGS AT HOME, OR GET ALONG WITH OTHER PEOPLE: 1
8. MOVING OR SPEAKING SO SLOWLY THAT OTHER PEOPLE COULD HAVE NOTICED. OR THE OPPOSITE, BEING SO FIGETY OR RESTLESS THAT YOU HAVE BEEN MOVING AROUND A LOT MORE THAN USUAL: 0
SUM OF ALL RESPONSES TO PHQ QUESTIONS 1-9: 11
6. FEELING BAD ABOUT YOURSELF - OR THAT YOU ARE A FAILURE OR HAVE LET YOURSELF OR YOUR FAMILY DOWN: 0
SUM OF ALL RESPONSES TO PHQ QUESTIONS 1-9: 11
1. LITTLE INTEREST OR PLEASURE IN DOING THINGS: 1
SUM OF ALL RESPONSES TO PHQ9 QUESTIONS 1 & 2: 3
2. FEELING DOWN, DEPRESSED OR HOPELESS: 2
5. POOR APPETITE OR OVEREATING: 1
4. FEELING TIRED OR HAVING LITTLE ENERGY: 3
3. TROUBLE FALLING OR STAYING ASLEEP: 3
7. TROUBLE CONCENTRATING ON THINGS, SUCH AS READING THE NEWSPAPER OR WATCHING TELEVISION: 1
SUM OF ALL RESPONSES TO PHQ QUESTIONS 1-9: 11
9. THOUGHTS THAT YOU WOULD BE BETTER OFF DEAD, OR OF HURTING YOURSELF: 0

## 2022-11-17 ASSESSMENT — ENCOUNTER SYMPTOMS
COLOR CHANGE: 0
ABDOMINAL PAIN: 1
SHORTNESS OF BREATH: 1
BACK PAIN: 0

## 2022-11-17 ASSESSMENT — COLUMBIA-SUICIDE SEVERITY RATING SCALE - C-SSRS
6. HAVE YOU EVER DONE ANYTHING, STARTED TO DO ANYTHING, OR PREPARED TO DO ANYTHING TO END YOUR LIFE?: NO
1. WITHIN THE PAST MONTH, HAVE YOU WISHED YOU WERE DEAD OR WISHED YOU COULD GO TO SLEEP AND NOT WAKE UP?: NO
2. HAVE YOU ACTUALLY HAD ANY THOUGHTS OF KILLING YOURSELF?: NO

## 2022-11-17 NOTE — PROGRESS NOTES
Jose David Lord  YOB: 1985    Date of Service:  11/17/2022    Chief Complaint:   Jose David Lord is a 40 y.o. female who presents for complete physical examination. HPI:  HPI  From November 14, 2022 reviewed at today's visit, within normal limits except for HDL 35, , white blood cell count 3.8, and Keppra less than 2.0 (patient states she is no longer taking Keppra)    Has seen Neurology in the past and was started on medication (Keppra) d/t frontal lobe swelling.      Hx abnormal PAP: no  Sexual activity: has sex with males   Self-breast exams: no  Previous DEXA scan: no  Last eye exam: 3 months ago, normal  Exercise: walks a lot  Seatbelt use: yes  Are You a Spiritual Person: yes  Advance Directive: no    Wt Readings from Last 3 Encounters:   11/17/22 177 lb 6.4 oz (80.5 kg)   07/11/22 172 lb (78 kg)   07/01/22 172 lb (78 kg)     BP Readings from Last 3 Encounters:   11/17/22 129/88   07/11/22 125/67   07/01/22 132/84       Patient Active Problem List   Diagnosis    Urinary tract infection with hematuria    Allergic rhinitis    Congenital bronchial atresia    Extrinsic asthma    Postoperative abdominal pain    Other chest pain    SOB (shortness of breath)    Palpitations    Bradycardia    PALOMA (obstructive sleep apnea)    Personal history of COVID-19    Class 1 obesity in adult       Health Maintenance   Topic Date Due    COVID-19 Vaccine (1) Never done    Varicella vaccine (1 of 2 - 2-dose childhood series) Never done    Pneumococcal 0-64 years Vaccine (1 - PCV) Never done    HIV screen  Never done    Hepatitis C screen  Never done    Cervical cancer screen  Never done    Flu vaccine (1) Never done    Depression Monitoring  07/01/2023    DTaP/Tdap/Td vaccine (2 - Td or Tdap) 02/13/2029    Hepatitis A vaccine  Aged Out    Hib vaccine  Aged Out    Meningococcal (ACWY) vaccine  Aged [employer] History   Administered Date(s) Administered    MMR 02/13/2019    Tdap (Boostrix, Adacel) 02/13/2019       Allergies   Allergen Reactions    Dye [Iodides] Other (See Comments)     Low HR    Sulfa Antibiotics Hives, Itching and Nausea Only    Vicodin [Hydrocodone-Acetaminophen] Other (See Comments)     Low HR     Codeine Palpitations     Outpatient Medications Marked as Taking for the 11/17/22 encounter (Office Visit) with Enid Akbar, DO   Medication Sig Dispense Refill    diclofenac (VOLTAREN) 75 MG EC tablet Take 1 tablet by mouth 2 times daily (with meals) 60 tablet 0    ibuprofen (ADVIL;MOTRIN) 800 MG tablet Take 1 tablet by mouth daily as needed for Pain (prn) 90 tablet 0    omeprazole (PRILOSEC) 40 MG delayed release capsule Take 1 capsule by mouth every morning (before breakfast) 30 capsule 5    albuterol sulfate HFA (VENTOLIN HFA) 108 (90 Base) MCG/ACT inhaler Inhale 2 puffs into the lungs every 6 hours as needed for Wheezing or Shortness of Breath 1 each 5       Past Medical History:   Diagnosis Date    ADHD (attention deficit hyperactivity disorder)     Allergic rhinitis 07/25/2019    Asthma     Class 1 obesity in adult 1/28/2022    COVID-19 08/03/2021    Kidney calculi     PALOMA (obstructive sleep apnea) 1/28/2022    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     Seizures (HCC)      Past Surgical History:   Procedure Laterality Date    CHOLECYSTECTOMY      TUBAL LIGATION       Family History   Problem Relation Age of Onset    Other Mother         Hypoglycemia    Other Maternal Grandmother         Thyroid issues     Cancer Maternal Grandmother         Bone cancer     Diabetes Maternal Grandmother     Cancer Maternal Grandfather         Bone cancer     Diabetes Sister      Social History     Socioeconomic History    Marital status:      Spouse name: Not on file    Number of children: Not on file    Years of education: Not on file    Highest education level: Not on file   Occupational History    Not on file   Tobacco Use    Smoking status: Never    Smokeless tobacco: Never Vaping Use    Vaping Use: Never used   Substance and Sexual Activity    Alcohol use: No    Drug use: No    Sexual activity: Not Currently   Other Topics Concern    Not on file   Social History Narrative    Not on file     Social Determinants of Health     Financial Resource Strain: Low Risk     Difficulty of Paying Living Expenses: Not hard at all   Food Insecurity: No Food Insecurity    Worried About Running Out of Food in the Last Year: Never true    [address] in the Last Year: Never true   Transportation Needs: No Transportation Needs    Lack of Transportation (Medical): No    Lack of Transportation (Non-Medical): No   Physical Activity: Not on file   Stress: Not on file   Social Connections: Not on file   Intimate Partner Violence: Not on file   Housing Stability: Unknown    Unable to Pay for Housing in the Last Year: No    Number of Places Lived in the Last Year: Not on file    Unstable Housing in the Last Year: No       Reviewof Systems:  Review of Systems   Constitutional:  Negative for fatigue. HENT:  Negative for congestion. Eyes:  Positive for visual disturbance (with HA's). Respiratory:  Positive for shortness of breath. Cardiovascular:  Positive for chest pain. Gastrointestinal:  Positive for abdominal pain. Endocrine: Positive for heat intolerance. Genitourinary:  Negative for difficulty urinating. Musculoskeletal:  Positive for neck pain (periodic). Negative for back pain. Skin:  Negative for color change. Allergic/Immunologic: Positive for environmental allergies. Neurological:  Positive for dizziness, light-headedness and headaches. Hematological:  Bruises/bleeds easily (bruises). Psychiatric/Behavioral:  Positive for dysphoric mood. Negative for suicidal ideas. The patient is nervous/anxious.       PhysicalExam:   Vitals:    11/17/22 1533   BP: 129/88   Site: Left Upper Arm   Position: Sitting   Cuff Size: Large Adult   Pulse: 75   Resp: 16   Temp: 97.2 °F (36.2 °C)   TempSrc: Temporal   Weight: 177 lb 6.4 oz (80.5 kg)   Height: 5' 2.5\" (1.588 m)     Body mass index is 31.93 kg/m². Physical Exam  Vitals reviewed. Constitutional:       Appearance: Normal appearance. She is well-developed. HENT:      Head: Normocephalic and atraumatic. Right Ear: Tympanic membrane, ear canal and external ear normal.      Left Ear: Tympanic membrane, ear canal and external ear normal.      Nose: Nose normal.      Mouth/Throat:      Mouth: Mucous membranes are moist.   Eyes:      Extraocular Movements: Extraocular movements intact. Conjunctiva/sclera: Conjunctivae normal.      Pupils: Pupils are equal, round, and reactive to light. Neck:      Vascular: No carotid bruit. Cardiovascular:      Rate and Rhythm: Normal rate and regular rhythm. Pulses: Normal pulses. Heart sounds: Normal heart sounds. No murmur heard. Pulmonary:      Effort: Pulmonary effort is normal.      Breath sounds: Normal breath sounds. No wheezing. Abdominal:      General: Bowel sounds are normal.      Palpations: Abdomen is soft. Tenderness: There is no abdominal tenderness. Musculoskeletal:         General: Normal range of motion. Cervical back: Normal range of motion. No rigidity or tenderness. Comments: Bilateral UE/LE normal ROM   Lymphadenopathy:      Cervical: No cervical adenopathy. Skin:     General: Skin is warm and dry. Neurological:      Mental Status: She is alert and oriented to person, place, and time. Cranial Nerves: No cranial nerve deficit. Sensory: No sensory deficit. Motor: No weakness. Coordination: Coordination normal.      Gait: Gait normal.      Deep Tendon Reflexes: Reflexes are normal and symmetric. Reflexes normal.   Psychiatric:         Behavior: Behavior normal.         Thought Content: Thought content normal.         Judgment: Judgment normal.       Assessment/Plan:   Diagnosis Orders   1. Annual physical exam        2. Episodic lightheadedness  TSH with Reflex (3 months)    TSH (3 months)    RUPERTO - Kely Caba MD, Neurology, [facility]      3. Leukopenia, unspecified type  CBC with Auto Differential (1 month)      4. Seizures ([address]  Corina Rosas MD, Neurology, [facility]      5. Chest pain, unspecified type  Galen Craven MD, Cardiology, [facility]        Counseled pt to go ED with any non-resolving CP. Return in about 1 year (around 11/17/2023) for Physical Exam.    Prior to Visit Medications    Medication Sig Taking?  Authorizing Provider   diclofenac (VOLTAREN) 75 MG EC tablet Take 1 tablet by mouth 2 times daily (with meals) Yes Raoul Brandt MD   ibuprofen (ADVIL;MOTRIN) 800 MG tablet Take 1 tablet by mouth daily as needed for Pain (prn) Yes AMBER Rordiguez CNP   omeprazole (PRILOSEC) 40 MG delayed release capsule Take 1 capsule by mouth every morning (before breakfast) Yes AMBER Rodriguez CNP   albuterol sulfate HFA (VENTOLIN HFA) 108 (90 Base) MCG/ACT inhaler Inhale 2 puffs into the lungs every 6 hours as needed for Wheezing or Shortness of Breath Yes Fredrick Hernandez MD   levETIRAcetam (KEPPRA) 500 MG tablet Take 1 tablet by mouth daily  Patient not taking: No sig reported  Chantal Payment, DO

## 2022-12-02 ENCOUNTER — TELEPHONE (OUTPATIENT)
Dept: FAMILY MEDICINE CLINIC | Age: 37
End: 2022-12-02

## 2022-12-02 NOTE — TELEPHONE ENCOUNTER
The pt called and stated she has been coughing up blood  this happened 3 times today, she has some dizziness and a slight blurring of her vision.  The pt denies any other sx and was advised to go to the ER she stated that she would do this

## 2023-01-10 ENCOUNTER — OFFICE VISIT (OUTPATIENT)
Dept: FAMILY MEDICINE CLINIC | Age: 38
End: 2023-01-10
Payer: COMMERCIAL

## 2023-01-10 VITALS
OXYGEN SATURATION: 98 % | SYSTOLIC BLOOD PRESSURE: 136 MMHG | DIASTOLIC BLOOD PRESSURE: 80 MMHG | TEMPERATURE: 97.1 F | WEIGHT: 180 LBS | HEART RATE: 58 BPM | RESPIRATION RATE: 16 BRPM | BODY MASS INDEX: 32.4 KG/M2

## 2023-01-10 DIAGNOSIS — H66.91 RIGHT OTITIS MEDIA, UNSPECIFIED OTITIS MEDIA TYPE: Primary | ICD-10-CM

## 2023-01-10 PROCEDURE — 1036F TOBACCO NON-USER: CPT | Performed by: FAMILY MEDICINE

## 2023-01-10 PROCEDURE — G8427 DOCREV CUR MEDS BY ELIG CLIN: HCPCS | Performed by: FAMILY MEDICINE

## 2023-01-10 PROCEDURE — G8417 CALC BMI ABV UP PARAM F/U: HCPCS | Performed by: FAMILY MEDICINE

## 2023-01-10 PROCEDURE — G8484 FLU IMMUNIZE NO ADMIN: HCPCS | Performed by: FAMILY MEDICINE

## 2023-01-10 PROCEDURE — 99213 OFFICE O/P EST LOW 20 MIN: CPT | Performed by: FAMILY MEDICINE

## 2023-01-10 RX ORDER — FLUTICASONE PROPIONATE 50 MCG
1 SPRAY, SUSPENSION (ML) NASAL DAILY
Qty: 1 EACH | Refills: 0 | Status: SHIPPED | OUTPATIENT
Start: 2023-01-10 | End: 2023-01-20

## 2023-01-10 RX ORDER — AMOXICILLIN AND CLAVULANATE POTASSIUM 875; 125 MG/1; MG/1
1 TABLET, FILM COATED ORAL 2 TIMES DAILY
Qty: 20 TABLET | Refills: 0 | Status: SHIPPED | OUTPATIENT
Start: 2023-01-10

## 2023-01-10 ASSESSMENT — PATIENT HEALTH QUESTIONNAIRE - PHQ9
SUM OF ALL RESPONSES TO PHQ QUESTIONS 1-9: 0
2. FEELING DOWN, DEPRESSED OR HOPELESS: 0
SUM OF ALL RESPONSES TO PHQ QUESTIONS 1-9: 0
SUM OF ALL RESPONSES TO PHQ QUESTIONS 1-9: 0
1. LITTLE INTEREST OR PLEASURE IN DOING THINGS: 0
SUM OF ALL RESPONSES TO PHQ9 QUESTIONS 1 & 2: 0
SUM OF ALL RESPONSES TO PHQ QUESTIONS 1-9: 0

## 2023-01-10 NOTE — PROGRESS NOTES
1/10/2023    This is a 45 y.o. female   Chief Complaint   Patient presents with    Follow-up     Urgent Care 1/8/22, Ear pressure, feels muffled and static    . HPI  Pt presents today for a 1 month hx  of right ear pressure and fullness. Was seen at Urgent Care on 01/08/22. Uses Q-Tips to clean her ears, Denies pain or discharge. Urgent care said no erythema. Has been having some chills but works at [employer] and it is always cold there. States that she is still having a sensation of almost falling.  States that neurology told her that they hadn't received the referral.  Past Medical History:   Diagnosis Date    ADHD (attention deficit hyperactivity disorder)     Allergic rhinitis 07/25/2019    Asthma     Class 1 obesity in adult 1/28/2022    COVID-19 08/03/2021    Kidney calculi     PALOMA (obstructive sleep apnea) 1/28/2022    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     Seizures (HCC)        Past Surgical History:   Procedure Laterality Date    CHOLECYSTECTOMY      TUBAL LIGATION         Social History     Socioeconomic History    Marital status:      Spouse name: Not on file    Number of children: Not on file    Years of education: Not on file    Highest education level: Not on file   Occupational History    Not on file   Tobacco Use    Smoking status: Never    Smokeless tobacco: Never   Vaping Use    Vaping Use: Never used   Substance and Sexual Activity    Alcohol use: No    Drug use: No    Sexual activity: Not Currently   Other Topics Concern    Not on file   Social History Narrative    Not on file     Social Determinants of Health     Financial Resource Strain: Low Risk     Difficulty of Paying Living Expenses: Not hard at all   Food Insecurity: No Food Insecurity    Worried About Running Out of Food in the Last Year: Never true    [address] in the Last Year: Never true   Transportation Needs: No Transportation Needs    Lack of Transportation (Medical): No    Lack of Transportation (Non-Medical): No   Physical Activity: Not on file   Stress: Not on file   Social Connections: Not on file   Intimate Partner Violence: Not on file   Housing Stability: Unknown    Unable to Pay for Housing in the Last Year: No    Number of Places Lived in the Last Year: Not on file    Unstable Housing in the Last Year: No       Family History   Problem Relation Age of Onset    Other Mother         Hypoglycemia    Other Maternal Grandmother         Thyroid issues     Cancer Maternal Grandmother         Bone cancer     Diabetes Maternal Grandmother     Cancer Maternal Grandfather         Bone cancer     Diabetes Sister        Current Outpatient Medications   Medication Sig Dispense Refill    fluticasone (FLONASE) 50 MCG/ACT nasal spray 1 spray by Nasal route daily for 10 days 1 each 0    amoxicillin-clavulanate (AUGMENTIN) 875-125 MG per tablet Take 1 tablet by mouth 2 times daily 20 tablet 0    ibuprofen (ADVIL;MOTRIN) 800 MG tablet Take 1 tablet by mouth daily as needed for Pain (prn) 90 tablet 0    albuterol sulfate HFA (VENTOLIN HFA) 108 (90 Base) MCG/ACT inhaler Inhale 2 puffs into the lungs every 6 hours as needed for Wheezing or Shortness of Breath 1 each 5     No current facility-administered medications for this visit.        Immunization History   Administered Date(s) Administered    COVID-19, PFIZER GRAY top, DO NOT Dilute, (age 15 y+), IM, 30 mcg/0.3 mL 11/11/2022, 12/06/2022    MMR 02/13/2019    Tdap (Boostrix, Adacel) 02/13/2019       Allergies   Allergen Reactions    Dye [Iodides] Other (See Comments)     Low HR    Sulfa Antibiotics Hives, Itching and Nausea Only    Vicodin [Hydrocodone-Acetaminophen] Other (See Comments)     Low HR     Codeine Palpitations       Orders Only on 11/14/2022   Component Date Value Ref Range Status    Levetiracetam Lvl 11/14/2022 <2.0 (A)  6.0 - 46.0 ug/mL Final    KEPPRA Dose Amt 11/14/2022 Unknown   Final    Sodium 11/14/2022 140  136 - 145 mmol/L Final    Potassium 11/14/2022 4.8  3.5 - 5.1 mmol/L Final    Chloride 11/14/2022 107  99 - 110 mmol/L Final    CO2 11/14/2022 26  21 - 32 mmol/L Final    Anion Gap 11/14/2022 7  3 - 16 Final    Glucose 11/14/2022 90  70 - 99 mg/dL Final    BUN 11/14/2022 12  7 - 20 mg/dL Final    Creatinine 11/14/2022 0.6  0.6 - 1.1 mg/dL Final    Est, Glom Filt Rate 11/14/2022 >60  >60 Final    Comment: Pediatric calculator link  [employer]. org/professionals/kdoqi/gfr_calculatorped  Effective Oct 3, 2022  These results are not intended for use in patients  <25years of age. eGFR results are calculated without  a race factor using the 2021 CKD-EPI equation. Careful  clinical correlation is recommended, particularly when  comparing to results calculated using previous equations. The CKD-EPI equation is less accurate in patients with  extremes of muscle mass, extra-renal metabolism of  creatinine, excessive creatinine ingestion, or following  therapy that affects renal tubular secretion.       Calcium 11/14/2022 9.7  8.3 - 10.6 mg/dL Final    Total Protein 11/14/2022 6.9  6.4 - 8.2 g/dL Final    Albumin 11/14/2022 4.4  3.4 - 5.0 g/dL Final    Albumin/Globulin Ratio 11/14/2022 1.8  1.1 - 2.2 Final    Total Bilirubin 11/14/2022 0.4  0.0 - 1.0 mg/dL Final    Alkaline Phosphatase 11/14/2022 85  40 - 129 U/L Final    ALT 11/14/2022 24  10 - 40 U/L Final    AST 11/14/2022 24  15 - 37 U/L Final    WBC 11/14/2022 3.8 (A)  4.0 - 11.0 K/uL Final    RBC 11/14/2022 4.80  4.00 - 5.20 M/uL Final    Hemoglobin 11/14/2022 14.8  12.0 - 16.0 g/dL Final    Hematocrit 11/14/2022 43.0  36.0 - 48.0 % Final    MCV 11/14/2022 89.5  80.0 - 100.0 fL Final    MCH 11/14/2022 30.7  26.0 - 34.0 pg Final    MCHC 11/14/2022 34.4  31.0 - 36.0 g/dL Final    RDW 11/14/2022 12.7  12.4 - 15.4 % Final    Platelets 84/61/1155 273  135 - 450 K/uL Final    MPV 11/14/2022 7.6  5.0 - 10.5 fL Final    Neutrophils % 11/14/2022 55.8  % Final    Lymphocytes % 11/14/2022 30.6  % Final    Monocytes % 11/14/2022 10.1  % Final    Eosinophils % 11/14/2022 2.5  % Final    Basophils % 11/14/2022 1.0  % Final    Neutrophils Absolute 11/14/2022 2.1  1.7 - 7.7 K/uL Final    Lymphocytes Absolute 11/14/2022 1.2  1.0 - 5.1 K/uL Final    Monocytes Absolute 11/14/2022 0.4  0.0 - 1.3 K/uL Final    Eosinophils Absolute 11/14/2022 0.1  0.0 - 0.6 K/uL Final    Basophils Absolute 11/14/2022 0.0  0.0 - 0.2 K/uL Final    Cholesterol, Total 11/14/2022 182  0 - 199 mg/dL Final    Triglycerides 11/14/2022 131  0 - 150 mg/dL Final    HDL 11/14/2022 35 (A)  40 - 60 mg/dL Final    Comment: An HDL cholesterol less than 40 mg/dL is low and  constitutes a coronary heart disease risk factor. An HDL cholesterol greater than 60 mg/dL is a  negative risk factor for coronary heart disease. LDL Calculated 11/14/2022 121 (A)  <100 mg/dL Final    VLDL Cholesterol Calculated 11/14/2022 26  Not Established mg/dL Final    T4 Free 11/14/2022 0.9  0.9 - 1.8 ng/dL Final    TSH 11/14/2022 2.02  0.27 - 4.20 uIU/mL Final       Review of Systems   HENT:  Positive for hearing loss. Negative for ear discharge and ear pain. /80 (Site: Left Upper Arm, Position: Sitting, Cuff Size: Large Adult)   Pulse 58   Temp 97.1 °F (36.2 °C) (Temporal)   Resp 16   Wt 180 lb (81.6 kg)   LMP 01/04/2023 (Approximate)   SpO2 98%   BMI 32.40 kg/m²     Physical Exam  Vitals reviewed. HENT:      Ears:      Comments: Clear edematous left TM, edematous dark/erythremic right TM      Plan   Diagnosis Orders   1. Right otitis media, unspecified otitis media type  fluticasone (FLONASE) 50 MCG/ACT nasal spray    amoxicillin-clavulanate (AUGMENTIN) 875-125 MG per tablet        Refaxed Neurology referral to [facility]. Return if symptoms worsen or fail to improve. Prior to Visit Medications    Medication Sig Taking?  Authorizing Provider   fluticasone (FLONASE) 50 MCG/ACT nasal spray 1 spray by Nasal route daily for 10 days Yes Yonatan Luke, DO amoxicillin-clavulanate (AUGMENTIN) 875-125 MG per tablet Take 1 tablet by mouth 2 times daily Yes Hathaway Agent, DO   ibuprofen (ADVIL;MOTRIN) 800 MG tablet Take 1 tablet by mouth daily as needed for Pain (prn) Yes AMBER Perez CNP   albuterol sulfate HFA (VENTOLIN HFA) 108 (90 Base) MCG/ACT inhaler Inhale 2 puffs into the lungs every 6 hours as needed for Wheezing or Shortness of Breath Yes Lopez Tristan MD

## 2023-01-20 ENCOUNTER — TELEPHONE (OUTPATIENT)
Dept: FAMILY MEDICINE CLINIC | Age: 38
End: 2023-01-20

## 2023-01-20 NOTE — TELEPHONE ENCOUNTER
Patient came in thinking she is due for follow up labs is asking provider to  please place order if so  .please notify  patient on whether to come in and have done or not

## 2023-01-21 NOTE — TELEPHONE ENCOUNTER
At her Physical  on 11/17/22 pt was instructed to have her CBC repeated in 1 month and the TSH/FT4 repeated in 3 months. She can do the CBC now and the thyroid labs in1 month. Thank you.

## 2023-01-23 DIAGNOSIS — D72.819 LEUKOPENIA, UNSPECIFIED TYPE: ICD-10-CM

## 2023-01-23 LAB
BASOPHILS ABSOLUTE: 0 K/UL (ref 0–0.2)
BASOPHILS RELATIVE PERCENT: 0.5 %
EOSINOPHILS ABSOLUTE: 0.1 K/UL (ref 0–0.6)
EOSINOPHILS RELATIVE PERCENT: 0.7 %
HCT VFR BLD CALC: 44.3 % (ref 36–48)
HEMOGLOBIN: 14.7 G/DL (ref 12–16)
LYMPHOCYTES ABSOLUTE: 1.9 K/UL (ref 1–5.1)
LYMPHOCYTES RELATIVE PERCENT: 25.7 %
MCH RBC QN AUTO: 29.8 PG (ref 26–34)
MCHC RBC AUTO-ENTMCNC: 33.2 G/DL (ref 31–36)
MCV RBC AUTO: 89.8 FL (ref 80–100)
MONOCYTES ABSOLUTE: 0.4 K/UL (ref 0–1.3)
MONOCYTES RELATIVE PERCENT: 6 %
NEUTROPHILS ABSOLUTE: 4.9 K/UL (ref 1.7–7.7)
NEUTROPHILS RELATIVE PERCENT: 67.1 %
PDW BLD-RTO: 13.3 % (ref 12.4–15.4)
PLATELET # BLD: 307 K/UL (ref 135–450)
PMV BLD AUTO: 8.4 FL (ref 5–10.5)
RBC # BLD: 4.93 M/UL (ref 4–5.2)
WBC # BLD: 7.4 K/UL (ref 4–11)

## 2023-02-02 ENCOUNTER — PATIENT MESSAGE (OUTPATIENT)
Dept: FAMILY MEDICINE CLINIC | Age: 38
End: 2023-02-02

## 2023-02-06 NOTE — TELEPHONE ENCOUNTER
From: Jose G Arevalo  Sent: 2/5/2023 5:13 PM EST  To: [facility]  Subject: CBC Result    OKAY THANKS!!   DID THE OFFICE EVER SENT THE NEUROLOGIST PAPER WORK TO GET ME IN ?  I HAVEN'T HEARD FROM ANYONE AND MY [employer] BEEN DOWN ALL WEEKEND AND I PASSED OUT AT WORK LAST WEEK AND TODAY MY HANDS TURNED PURPLE AND I FEEL DIZZY

## 2023-03-02 ENCOUNTER — TELEPHONE (OUTPATIENT)
Dept: FAMILY MEDICINE CLINIC | Age: 38
End: 2023-03-02

## 2023-03-02 NOTE — TELEPHONE ENCOUNTER
----- Message from [facility] sent at 3/2/2023 11:35 AM EST -----  Subject: Message to Provider    QUESTIONS  Information for Provider? Patient is requesting order for MRI for previous   health issues. Patient had another siezure last week. Please advise.   ---------------------------------------------------------------------------  --------------  Steva Alpers [facility]  [phone]; OK to leave message on Jdguanjia,OK to respond with electronic   message via [employer] portal (only for patients who have registered [employer]   account)  ---------------------------------------------------------------------------  --------------  SCRIPT ANSWERS  Relationship to Patient?  Self

## 2023-03-08 ENCOUNTER — OFFICE VISIT (OUTPATIENT)
Dept: FAMILY MEDICINE CLINIC | Age: 38
End: 2023-03-08
Payer: COMMERCIAL

## 2023-03-08 VITALS
OXYGEN SATURATION: 99 % | DIASTOLIC BLOOD PRESSURE: 78 MMHG | HEART RATE: 86 BPM | SYSTOLIC BLOOD PRESSURE: 112 MMHG | RESPIRATION RATE: 16 BRPM | TEMPERATURE: 97.1 F | BODY MASS INDEX: 32.25 KG/M2 | WEIGHT: 179.2 LBS

## 2023-03-08 DIAGNOSIS — F41.9 ANXIETY: ICD-10-CM

## 2023-03-08 DIAGNOSIS — R45.89 DEPRESSED MOOD: ICD-10-CM

## 2023-03-08 DIAGNOSIS — R56.9 SEIZURES (HCC): Primary | ICD-10-CM

## 2023-03-08 DIAGNOSIS — R42 VERTIGO: ICD-10-CM

## 2023-03-08 PROCEDURE — 99214 OFFICE O/P EST MOD 30 MIN: CPT | Performed by: FAMILY MEDICINE

## 2023-03-08 PROCEDURE — G8427 DOCREV CUR MEDS BY ELIG CLIN: HCPCS | Performed by: FAMILY MEDICINE

## 2023-03-08 PROCEDURE — 1036F TOBACCO NON-USER: CPT | Performed by: FAMILY MEDICINE

## 2023-03-08 PROCEDURE — G8417 CALC BMI ABV UP PARAM F/U: HCPCS | Performed by: FAMILY MEDICINE

## 2023-03-08 PROCEDURE — G8484 FLU IMMUNIZE NO ADMIN: HCPCS | Performed by: FAMILY MEDICINE

## 2023-03-08 RX ORDER — BUPROPION HYDROCHLORIDE 150 MG/1
150 TABLET ORAL EVERY MORNING
Qty: 30 TABLET | Refills: 3 | Status: SHIPPED | OUTPATIENT
Start: 2023-03-08

## 2023-03-08 ASSESSMENT — PATIENT HEALTH QUESTIONNAIRE - PHQ9
SUM OF ALL RESPONSES TO PHQ QUESTIONS 1-9: 19
2. FEELING DOWN, DEPRESSED OR HOPELESS: 3
5. POOR APPETITE OR OVEREATING: 3
3. TROUBLE FALLING OR STAYING ASLEEP: 3
1. LITTLE INTEREST OR PLEASURE IN DOING THINGS: 2
10. IF YOU CHECKED OFF ANY PROBLEMS, HOW DIFFICULT HAVE THESE PROBLEMS MADE IT FOR YOU TO DO YOUR WORK, TAKE CARE OF THINGS AT HOME, OR GET ALONG WITH OTHER PEOPLE: 3
SUM OF ALL RESPONSES TO PHQ QUESTIONS 1-9: 19
SUM OF ALL RESPONSES TO PHQ QUESTIONS 1-9: 19
7. TROUBLE CONCENTRATING ON THINGS, SUCH AS READING THE NEWSPAPER OR WATCHING TELEVISION: 3
4. FEELING TIRED OR HAVING LITTLE ENERGY: 3
6. FEELING BAD ABOUT YOURSELF - OR THAT YOU ARE A FAILURE OR HAVE LET YOURSELF OR YOUR FAMILY DOWN: 2
SUM OF ALL RESPONSES TO PHQ QUESTIONS 1-9: 19
9. THOUGHTS THAT YOU WOULD BE BETTER OFF DEAD, OR OF HURTING YOURSELF: 0
SUM OF ALL RESPONSES TO PHQ9 QUESTIONS 1 & 2: 5
8. MOVING OR SPEAKING SO SLOWLY THAT OTHER PEOPLE COULD HAVE NOTICED. OR THE OPPOSITE, BEING SO FIGETY OR RESTLESS THAT YOU HAVE BEEN MOVING AROUND A LOT MORE THAN USUAL: 0

## 2023-03-08 NOTE — PROGRESS NOTES
3/8/2023    This is a 45 y.o. female   Chief Complaint   Patient presents with    Follow-up     Seizures, swelling in frontal lobe, headaches, head feels foggy,     Panic Attack     Positive PHQ, thinks she may be having panic attacks, Aware that she will need a vv friday to further address     . HPI  Pt presents today for the following:    Seizures: Seizures, swelling in frontal lobe, HA's and foggy feeling, formerly on keppra 50 mg daily. Was seeing neurology at [address] [place] [address] but hasn't been there for a while d/t not taking her insurance. Admits to periodic room spinning and nausea, vision disturbance, migraines, difficulty concentrating,     States that she had a seizure 2 weeks ago, went to urgent care, quit job after the seizure d/t stress. Last had Keppra last summer and unsure why she stopped taking it. Panic Attacks: Taking no prescription anxiety/depression medication. Admits to emotions all over the place and cries, insomnia, was formerly on medication \"but didn't like it\" (Zoloft). Still having a lot of stress.   Past Medical History:   Diagnosis Date    ADHD (attention deficit hyperactivity disorder)     Allergic rhinitis 07/25/2019    Asthma     Class 1 obesity in adult 1/28/2022    COVID-19 08/03/2021    Kidney calculi     PALOMA (obstructive sleep apnea) 1/28/2022    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     Seizures ([facility] [address]        Past Surgical History:   Procedure Laterality Date    CHOLECYSTECTOMY      TUBAL LIGATION         Social History     Socioeconomic History    Marital status:      Spouse name: Not on file    Number of children: Not on file    Years of education: Not on file    Highest education level: Not on file   Occupational History    Not on file   Tobacco Use    Smoking status: Never    Smokeless tobacco: Never   Vaping Use    Vaping Use: Never used   Substance and Sexual Activity    Alcohol use: No    Drug use: No    Sexual activity: Not Currently   Other Topics Concern Not on file   Social History Narrative    Not on file     Social Determinants of Health     Financial Resource Strain: Low Risk     Difficulty of Paying Living Expenses: Not hard at all   Food Insecurity: No Food Insecurity    Worried About [address] in the Last Year: Never true    [address] [employer] in the Last Year: Never true   Transportation Needs: No Transportation Needs    Lack of Transportation (Medical): No    Lack of Transportation (Non-Medical): No   Physical Activity: Not on file   Stress: Not on file   Social Connections: Not on file   Intimate Partner Violence: Not on file   Housing Stability: Unknown    Unable to Pay for Housing in the Last Year: No    Number of Places Lived in the Last Year: Not on file    Unstable Housing in the Last Year: No       Family History   Problem Relation Age of Onset    Other Mother         Hypoglycemia    Other Maternal Grandmother         Thyroid issues     Cancer Maternal Grandmother         Bone cancer     Diabetes Maternal Grandmother     Cancer Maternal Grandfather         Bone cancer     Diabetes Sister        Current Outpatient Medications   Medication Sig Dispense Refill    buPROPion (WELLBUTRIN XL) 150 MG extended release tablet Take 1 tablet by mouth every morning 30 tablet 3    ibuprofen (ADVIL;MOTRIN) 800 MG tablet Take 1 tablet by mouth daily as needed for Pain (prn) 90 tablet 0    albuterol sulfate HFA (VENTOLIN HFA) 108 (90 Base) MCG/ACT inhaler Inhale 2 puffs into the lungs every 6 hours as needed for Wheezing or Shortness of Breath 1 each 5     No current facility-administered medications for this visit.        Immunization History   Administered Date(s) Administered    COVID-19, PFIZER GRAY top, DO NOT Dilute, (age 15 y+), IM, 30 mcg/0.3 mL 11/11/2022, 12/06/2022    MMR 02/13/2019    Tdap (Boostrix, Adacel) 02/13/2019       Allergies   Allergen Reactions    Dye [Iodides] Other (See Comments)     Low HR    Sulfa Antibiotics Hives, Itching and Nausea Only    Vicodin [Hydrocodone-Acetaminophen] Other (See Comments)     Low HR     Codeine Palpitations       Orders Only on 01/23/2023   Component Date Value Ref Range Status    WBC 01/23/2023 7.4  4.0 - 11.0 K/uL Final    RBC 01/23/2023 4.93  4.00 - 5.20 M/uL Final    Hemoglobin 01/23/2023 14.7  12.0 - 16.0 g/dL Final    Hematocrit 01/23/2023 44.3  36.0 - 48.0 % Final    MCV 01/23/2023 89.8  80.0 - 100.0 fL Final    MCH 01/23/2023 29.8  26.0 - 34.0 pg Final    MCHC 01/23/2023 33.2  31.0 - 36.0 g/dL Final    RDW 01/23/2023 13.3  12.4 - 15.4 % Final    Platelets 65/12/1831 307  135 - 450 K/uL Final    MPV 01/23/2023 8.4  5.0 - 10.5 fL Final    Neutrophils % 01/23/2023 67.1  % Final    Lymphocytes % 01/23/2023 25.7  % Final    Monocytes % 01/23/2023 6.0  % Final    Eosinophils % 01/23/2023 0.7  % Final    Basophils % 01/23/2023 0.5  % Final    Neutrophils Absolute 01/23/2023 4.9  1.7 - 7.7 K/uL Final    Lymphocytes Absolute 01/23/2023 1.9  1.0 - 5.1 K/uL Final    Monocytes Absolute 01/23/2023 0.4  0.0 - 1.3 K/uL Final    Eosinophils Absolute 01/23/2023 0.1  0.0 - 0.6 K/uL Final    Basophils Absolute 01/23/2023 0.0  0.0 - 0.2 K/uL Final       Review of Systems   Eyes:  Positive for visual disturbance. Neurological:  Positive for dizziness and headaches. Psychiatric/Behavioral:  Positive for confusion and dysphoric mood. The patient is nervous/anxious. /78 (Site: Left Upper Arm, Position: Sitting, Cuff Size: Large Adult)   Pulse 86   Temp 97.1 °F (36.2 °C) (Temporal)   Resp 16   Wt 179 lb 3.2 oz (81.3 kg)   LMP 03/01/2023 (Exact Date)   SpO2 99%   BMI 32.25 kg/m²     Physical Exam  Vitals reviewed. Psychiatric:         Behavior: Behavior normal.         Thought Content: Thought content normal.         Judgment: Judgment normal.      Comments: Tearful at times       Plan   Diagnosis Orders   1. Seizures ([address]  [facility]      2. Depressed mood  External Referral To Social Work      3. Vertigo  Provided Epley maneuver information      4. Anxiety  External Referral To Social Work    buPROPion (WELLBUTRIN XL) 150 MG extended release tablet        Pt wishes to not start Keppra at this time. Return in about 2 weeks (around 3/22/2023) for Depression F/U. Prior to Visit Medications    Medication Sig Taking?  Authorizing Provider   buPROPion (WELLBUTRIN XL) 150 MG extended release tablet Take 1 tablet by mouth every morning Yes Debi Meyer,    ibuprofen (ADVIL;MOTRIN) 800 MG tablet Take 1 tablet by mouth daily as needed for Pain (prn) Yes Wallace Wu APRN - CNP   albuterol sulfate HFA (VENTOLIN HFA) 108 (90 Base) MCG/ACT inhaler Inhale 2 puffs into the lungs every 6 hours as needed for Wheezing or Shortness of Breath Yes Winifred Urban MD

## 2023-03-09 ENCOUNTER — TELEPHONE (OUTPATIENT)
Dept: FAMILY MEDICINE CLINIC | Age: 38
End: 2023-03-09

## 2023-03-09 NOTE — TELEPHONE ENCOUNTER
Please call [facility] neurology to see if patient can be seen sooner. Any location or provider is acceptable. Thank you.

## 2023-03-09 NOTE — TELEPHONE ENCOUNTER
Regarding: neurology   ----- Message from Sri Monahan sent at 3/9/2023 11:39 AM EST -----       ----- Message from Temo Salas to Ernesto Diaz DO sent at 3/9/2023 11:26 AM -----   Dr Loree Choudhary told me to let him know if the neurology doesn't get me in soon ! they said earliest maged they can get me in is May 8th at the [facility] [employer] [facility] office or July 11 at the [facility] office . . I told them the may 8 !! I don't know how I can work without being afraid of  getting another seizure while at a job

## 2023-03-10 NOTE — TELEPHONE ENCOUNTER
Marii Spoke with Neurology. They had a cancellation for Tuesday 3/14/23 at 11:15. They said the patient has to be the one to make the appointment, but that she needs to call back quickly because they do have a waiting list. NAVID for patent asking her to call back ASAP.

## 2023-03-14 ENCOUNTER — OFFICE VISIT (OUTPATIENT)
Dept: NEUROLOGY | Age: 38
End: 2023-03-14
Payer: COMMERCIAL

## 2023-03-14 VITALS
HEART RATE: 80 BPM | HEIGHT: 63 IN | OXYGEN SATURATION: 98 % | WEIGHT: 180 LBS | BODY MASS INDEX: 31.89 KG/M2 | SYSTOLIC BLOOD PRESSURE: 155 MMHG | DIASTOLIC BLOOD PRESSURE: 108 MMHG

## 2023-03-14 DIAGNOSIS — F34.1 DYSTHYMIA: ICD-10-CM

## 2023-03-14 DIAGNOSIS — R41.89 SPELL OF ALTERED COGNITION: ICD-10-CM

## 2023-03-14 DIAGNOSIS — G43.119 INTRACTABLE MIGRAINE WITH AURA WITHOUT STATUS MIGRAINOSUS: Primary | ICD-10-CM

## 2023-03-14 DIAGNOSIS — G44.221 CHRONIC TENSION-TYPE HEADACHE, INTRACTABLE: ICD-10-CM

## 2023-03-14 PROCEDURE — 99204 OFFICE O/P NEW MOD 45 MIN: CPT | Performed by: PSYCHIATRY & NEUROLOGY

## 2023-03-14 PROCEDURE — G8427 DOCREV CUR MEDS BY ELIG CLIN: HCPCS | Performed by: PSYCHIATRY & NEUROLOGY

## 2023-03-14 PROCEDURE — 1036F TOBACCO NON-USER: CPT | Performed by: PSYCHIATRY & NEUROLOGY

## 2023-03-14 PROCEDURE — G8417 CALC BMI ABV UP PARAM F/U: HCPCS | Performed by: PSYCHIATRY & NEUROLOGY

## 2023-03-14 PROCEDURE — G8484 FLU IMMUNIZE NO ADMIN: HCPCS | Performed by: PSYCHIATRY & NEUROLOGY

## 2023-03-14 RX ORDER — TOPIRAMATE 25 MG/1
50 TABLET ORAL 2 TIMES DAILY
Qty: 120 TABLET | Refills: 2 | Status: SHIPPED | OUTPATIENT
Start: 2023-03-14

## 2023-03-14 NOTE — PROGRESS NOTES
The patient is a 45y.o. years old female who  was referred by Payton Odell DO for consultation regarding history of seizure, chronic migraine and chronic vertigo    HPI:  The patient describes recurrent episodes for the last 4 years. Frequency is sporadic could be once every several weeks. Description feeling lightheaded and dizzy followed by brief alteration of consciousness without LOC for few seconds to minutes. No other relieving or aggravating factors. No postictal state or witnessed convulsion. No family history of epilepsy or history of head trauma with LOC or febrile convulsions. She was seen by neurology at [facility] back 3 years ago. She had work-up at the time which showed \"frontal swelling\". She was placed on Keppra for few weeks which caused side effect with behavior changes. She is now not taking any AED. Last MRI in 2020 was normal.  She has not had any episode for the last several weeks. She also described chronic migraine with aura. Onset was years ago. Frequency is weekly and description is severe pulsating pain with nausea, photophobia and phonophobia. Can interfere with ADL. Can be triggered by loud noise or stress. No weakness or numbness or tingling. No family history of migraine. She has been using daily Motrin 800 mg for the last 4 to 5 years. No recent blood test.  She did not try any other preventative medication in the past except for a trial of Elavil. Patient is also complaining of intermittent dizziness and vertigo. Could be once every few weeks. Description spinning sensation with head positioning or movement for a few minutes. Elvie Roche once because of these episode but no severe head trauma. No ringing in the ears or hearing impairment. She is on SSRI. Occasional insomnia but no other sleep disturbance.           ROS : A 10-14 system review of constitutional, cardiovascular, respiratory, GI, eyes, , ENT, musculoskeletal, endocrine, skin, SHEENT, genitourinary, psychiatric and neurologic systems was obtained and updated today and is unremarkable except as mentioned in my HPI        Exam:   Constitutional:   Vitals:    03/14/23 1135   BP: (!) 155/108   Site: Left Wrist   Position: Sitting   Pulse: 80   SpO2: 98%   Weight: 180 lb (81.6 kg)   Height: 5' 2.5\" (1.588 m)       General appearance:  Normal development and appear in no acute distress. Mental Status:   Oriented to person, place, problem, and time. Memory: Good immediate recall. Intact remote memory  Normal attention span and concentration. Language: intact naming, repeating and fluency   Good fund of Knowledge. Aware of current events and vocabulary   Cranial Nerves:   II: Visual fields: Full. Pupils: equal, round, reactive to light  III,IV,VI: Extra Ocular Movements are intact. No nystagmus  V: Facial sensation is intact  VII: Facial strength and movements: intact and symmetric  XII: Tongue movements are normal  Musculoskeletal: 5/5 in all 4 extremities. Tone: Normal tone. Reflexes: Symmetric 2+ in the arms and 2+ in the legs   Coordination: no pronator drift, no dysmetria with FNF and normal REM  Sensation: normal.  Gait/Posture: steady gait with normal posturing and station. Medical decision making:  I personally reviewed and updated social history, past medical history, medications, allergy, surgical history, and family history as documented in the patient's electronic health records. Labs and/or neuroimaging and other test results reviewed and discussed with the patient. Reviewed notes from other physicians. Provided patient education regarding risk, benefits and treatment options as well as adherence to medication regimen and side effect from these medications.   Reviewed outside records where she was seen by different neurologist in the past.  Last MRI 3 years ago was normal.  EEG showed by report right frontal delta activity of unclear significance. Assessment:     Diagnosis Orders   1. Intractable migraine with aura without status migrainosus  topiramate (TOPAMAX) 25 MG tablet      2. Chronic tension-type headache, intractable        3. Spell of altered cognition  EEG      4. Dysthymia            Recurrent spells of unclear etiology. Less likely seizure. No risk for epilepsy. Equivocal EEG in the past but not clearly epileptiform in nature. Repeat EEG at this point. Discussed seizure precautions including driving precautions, falling or injury and risk of recurrence. If symptoms persist, can consider continuous EEG recording. No need to repeat MRI brain at this point    Chronic migraine with aura which is refractory to  Chronic daily headaches  Medication overuse headache    Lengthy discussion with the patient regarding risk of rebound headaches with daily Motrin. Advised the patient to cut down and taper gradually the intake of Motrin over the next few weeks. Discussed risk of kidney and liver injury. Repeat routine blood testing with PCP. Start Topamax for migraine prevention    Start Topamax at 25 mg daily for one week and then increase it every week by 25 mg to reach a goal of 50 mg BID in 4 weeks. Side effect was discussed today with the patient in details. This include but not limited to possibility of kidney stones, glaucoma, paresthesia, weight loss, decreased appetite, and frequent urination. The patient voiced understanding. Adequate hydration.      Headache diary  Hydration  Vestibular precautions for now  Continue SSRI  Can consider Maxalt 10 Mg as needed for migraine with aura    Follow-up with me in 2 to 3-month

## 2023-03-20 ENCOUNTER — HOSPITAL ENCOUNTER (OUTPATIENT)
Dept: NEUROLOGY | Age: 38
Discharge: HOME | End: 2023-03-20
Payer: COMMERCIAL

## 2023-03-20 DIAGNOSIS — R41.89 SPELL OF ALTERED COGNITION: ICD-10-CM

## 2023-03-20 PROCEDURE — 95816 EEG AWAKE AND DROWSY: CPT

## 2023-03-20 PROCEDURE — 95816 EEG AWAKE AND DROWSY: CPT | Performed by: PSYCHIATRY & NEUROLOGY

## 2023-03-20 NOTE — PROCEDURES
Patient: Iris Galeano    MR Number: [id number]  YOB: 1985  Date of Visit: 3/20/2023    Clinical History:  The patient is a 45y.o. years old female with possible new onset seizure. Method: The EEG was performed utilizing the international 10/20 of electrode placements of both referential and bipolar montages. The patient was awake and drowsy through out the recording. Photic stimulation was performed. Findings: The background of the EEG showed normal alpha posterior background of 9-10  HZ and amplitude of 20-40 UV. This background was symmetric, waxing and waning, and reactive with eye opening and closure. As the patient became drowsy, generalized diffuse slowing was seen through recording at 6-7 HZ. This generalized slowing was symmetric, non rhythmical, and continuous. No spike or sharp waves were seen. Photic stimulation did activate EEG. Impression: This EEG  is within normal limits. There is no evidence of epileptiform discharges, focal, or lateralizing abnormalities.       Torey Chavez MD      Board certified in clinical neurophysiology

## 2023-05-15 ENCOUNTER — APPOINTMENT (OUTPATIENT)
Dept: CT IMAGING | Age: 38
End: 2023-05-15
Payer: COMMERCIAL

## 2023-05-15 ENCOUNTER — OFFICE VISIT (OUTPATIENT)
Dept: URGENT CARE | Age: 38
End: 2023-05-15

## 2023-05-15 ENCOUNTER — HOSPITAL ENCOUNTER (EMERGENCY)
Age: 38
Discharge: HOME | End: 2023-05-16
Payer: COMMERCIAL

## 2023-05-15 VITALS
HEART RATE: 80 BPM | OXYGEN SATURATION: 99 % | HEIGHT: 63 IN | BODY MASS INDEX: 32.43 KG/M2 | RESPIRATION RATE: 18 BRPM | WEIGHT: 183 LBS | DIASTOLIC BLOOD PRESSURE: 78 MMHG | SYSTOLIC BLOOD PRESSURE: 134 MMHG | TEMPERATURE: 98.5 F

## 2023-05-15 VITALS
WEIGHT: 180 LBS | DIASTOLIC BLOOD PRESSURE: 86 MMHG | HEART RATE: 68 BPM | HEIGHT: 63 IN | RESPIRATION RATE: 15 BRPM | BODY MASS INDEX: 31.89 KG/M2 | OXYGEN SATURATION: 100 % | SYSTOLIC BLOOD PRESSURE: 142 MMHG | TEMPERATURE: 98.4 F

## 2023-05-15 DIAGNOSIS — J02.9 ACUTE VIRAL PHARYNGITIS: Primary | ICD-10-CM

## 2023-05-15 DIAGNOSIS — R09.89 FOREIGN BODY SENSATION IN THROAT: Primary | ICD-10-CM

## 2023-05-15 PROCEDURE — 99284 EMERGENCY DEPT VISIT MOD MDM: CPT

## 2023-05-15 PROCEDURE — 70490 CT SOFT TISSUE NECK W/O DYE: CPT

## 2023-05-15 RX ORDER — AMOXICILLIN AND CLAVULANATE POTASSIUM 875; 125 MG/1; MG/1
1 TABLET, FILM COATED ORAL 2 TIMES DAILY
COMMUNITY
Start: 2023-05-10

## 2023-05-15 ASSESSMENT — ENCOUNTER SYMPTOMS
NAUSEA: 1
RESPIRATORY NEGATIVE: 1
SORE THROAT: 1
DIARRHEA: 0
CONSTIPATION: 0
VOMITING: 0

## 2023-05-15 ASSESSMENT — PAIN DESCRIPTION - DESCRIPTORS: DESCRIPTORS: DISCOMFORT

## 2023-05-15 ASSESSMENT — PAIN - FUNCTIONAL ASSESSMENT: PAIN_FUNCTIONAL_ASSESSMENT: 0-10

## 2023-05-15 ASSESSMENT — PAIN DESCRIPTION - LOCATION: LOCATION: THROAT

## 2023-05-15 ASSESSMENT — PAIN DESCRIPTION - PAIN TYPE: TYPE: ACUTE PAIN

## 2023-05-15 ASSESSMENT — PAIN SCALES - GENERAL: PAINLEVEL_OUTOF10: 8

## 2023-05-15 NOTE — PROGRESS NOTES
Xu Washington (:  1985) is a 45 y.o. female,New patient, here for evaluation of the following chief complaint(s):  Pharyngitis (Patient feels like throat is swollen/ something in her throat X 2 days, Patient is on Augmentin for pneumonia)      ASSESSMENT/PLAN:    ICD-10-CM    1. Acute viral pharyngitis  J02.9         Patient in no acute distress. Vital signs WNL. Discussed no acute symptoms. Recommend follow up with PCP is symptoms persist.  Return if symptoms worsen or fail to improve. SUBJECTIVE/OBJECTIVE:  45year old female presents with feeling like her throat is swollen for 2 days. States that it feels weird with difficulty swallowing. She reports having a fever 2 days ago with temp max of 102.0  She was seen at an Urgent Care on 5/10/23 for sore throat and chest congestion - tested negative for COVID and strep at that visit. She was started on Augmentin but stopped 2 days later due to negative throat culture. She has not treated with OTC medications. History provided by:  Patient   used: No      Vitals:    05/15/23 192   BP: 134/78   Site: Left Upper Arm   Position: Sitting   Cuff Size: Medium Adult   Pulse: 80   Resp: 18   Temp: 98.5 °F (36.9 °C)   TempSrc: Oral   SpO2: 99%   Weight: 183 lb (83 kg)   Height: 5' 3\" (1.6 m)       Review of Systems   Constitutional:  Positive for fatigue. Negative for appetite change. HENT:  Positive for sneezing and sore throat. Negative for congestion, ear pain and postnasal drip. Respiratory: Negative. Cardiovascular: Negative. Gastrointestinal:  Positive for nausea. Negative for constipation, diarrhea and vomiting. Endocrine: Negative. Neurological: Negative. Physical Exam  Vitals reviewed. Constitutional:       General: She is not in acute distress. Appearance: She is obese. She is not ill-appearing. HENT:      Head: Normocephalic.       Right Ear: Tympanic membrane, ear canal and external ear

## 2023-05-16 PROCEDURE — 6370000000 HC RX 637 (ALT 250 FOR IP): Performed by: PHYSICIAN ASSISTANT

## 2023-05-16 RX ADMIN — ALUMINUM HYDROXIDE, MAGNESIUM HYDROXIDE, AND SIMETHICONE: 200; 200; 20 SUSPENSION ORAL at 00:26

## 2023-05-16 NOTE — ED PROVIDER NOTES
[address] ED  EMERGENCY DEPARTMENT ENCOUNTER        Pt Name: Yael Savage  MRN: [id number]  [place] 1985  Date of evaluation: 5/15/2023  Provider: Ajit Leiva PA-C  PCP: Ernesto Diaz DO  Note Started: 10:34 PM EDT 5/15/23      YOU. I have evaluated this patient. My supervising physician was available for consultation. CHIEF COMPLAINT       Chief Complaint   Patient presents with    Pharyngitis     Patient states that she has sore throat and it feels swollen for 2 days, treated last week for strep       HISTORY OF PRESENT ILLNESS: 1 or more Elements     History From: Patient    Limitations to history : None    Yael Savage is a 45 y.o. female who presents to the emergency department with complaint of pain with swallowing and throat. No trouble with breathing. No cough, wheezing or shortness of breath. She does state when she swallows she has pain in the back of her throat and feels it somewhat into her chest.  She is still able to eat and drink. She reports recently was evaluated for sore throat thought she might have strep throat. Culture obtained. Culture results negative. The patient was started on Augmentin. She had 2 days worth of dosing until the culture resulted. She does not have known GERD. She has had previous EGD and no other contact needed by Dr. Jose Amezcua following a cholecystectomy with some complication. She has some occasional nausea but no emesis, diarrhea or constipation. No GERD symptoms at this time. Nursing Notes were all reviewed and agreed with or any disagreements were addressed in the HPI. REVIEW OF SYSTEMS :      Review of Systems    Positives and Pertinent negatives as per HPI.      SURGICAL HISTORY     Past Surgical History:   Procedure Laterality Date    CHOLECYSTECTOMY      TUBAL LIGATION         CURRENTMEDICATIONS       Previous Medications    ALBUTEROL SULFATE HFA (VENTOLIN HFA) 108 (90 BASE) MCG/ACT INHALER    Inhale

## 2023-05-16 NOTE — DISCHARGE INSTRUCTIONS
I do understand a foreign body sensation of throat. I did obtain a CT scan without IV contrast as you had bradycardia previous CT scan and IV contrast.  This CT scan showed no anatomic abnormalities nor obvious foreign bodies. GI cocktail given. I do recommend use of Maalox or Mylanta when needed particularly before eating food. I do recommend that you contact ENT in the morning for an appointment tomorrow if possible with any provider at any time at any location.

## 2023-08-28 ENCOUNTER — TELEPHONE (OUTPATIENT)
Dept: FAMILY MEDICINE CLINIC | Age: 38
End: 2023-08-28

## 2023-08-28 NOTE — TELEPHONE ENCOUNTER
Please advise in Dr. Cora Bedolla absence.       RSV is not treatable with antibiotic because it's a virus right??

## 2023-08-28 NOTE — TELEPHONE ENCOUNTER
Yes I agree, RSV is a virus and cannot be treated with an antibiotic. Antibiotics are for bacterial infections  Viral infections are treated with supportive care nasal saline drops, staying hydrated , over-the-counter fever reducers (Tylenol and ibuprofen)  to manage your symptoms.

## 2023-08-28 NOTE — TELEPHONE ENCOUNTER
Patient called. She is sick and her school had her go to urgent care to get treated. They did not put her on an antibiotic. She tested negative for strep and COVID was told she has RSV. She is unable to return to work without  being on an antibiotic since RSV is contagious. She is asking if we can prescribe an antibiotic for her without coming in since she has already gone to  urgent care. I advised we would need the note from the urgent care. Patient was to call and have them fax it.     Please let pt know if we do not gt the fax

## 2023-09-06 ENCOUNTER — OFFICE VISIT (OUTPATIENT)
Dept: URGENT CARE | Age: 38
End: 2023-09-06

## 2023-09-06 VITALS
HEART RATE: 71 BPM | BODY MASS INDEX: 31.89 KG/M2 | TEMPERATURE: 98.1 F | RESPIRATION RATE: 18 BRPM | DIASTOLIC BLOOD PRESSURE: 84 MMHG | WEIGHT: 180 LBS | OXYGEN SATURATION: 97 % | SYSTOLIC BLOOD PRESSURE: 122 MMHG

## 2023-09-06 DIAGNOSIS — Z20.822 EXPOSURE TO CONFIRMED CASE OF COVID-19: ICD-10-CM

## 2023-09-06 DIAGNOSIS — L98.9 SKIN LESION OF FACE: ICD-10-CM

## 2023-09-06 DIAGNOSIS — R19.7 DIARRHEA, UNSPECIFIED TYPE: Primary | ICD-10-CM

## 2023-09-06 DIAGNOSIS — J06.9 UPPER RESPIRATORY TRACT INFECTION, UNSPECIFIED TYPE: ICD-10-CM

## 2023-09-06 LAB
EXP DATE SOLUTION: NORMAL
EXP DATE SWAB: NORMAL
EXPIRATION DATE: NORMAL
LOT NUMBER POC: NORMAL
LOT NUMBER SOLUTION: NORMAL
LOT NUMBER SWAB: NORMAL
SARS-COV-2 RNA, POC: NEGATIVE

## 2023-09-06 NOTE — PROGRESS NOTES
Delsie Hodgkin (: 1985) is a 45 y.o. female,Established patient, here for evaluation of the following chief complaint(s):  Fever (Sx started last week. Tested positive for Strep Throat. Exposure to Covid)      ASSESSMENT/PLAN:    ICD-10-CM    1. Diarrhea, unspecified type  R19.7       2. Upper respiratory tract infection, unspecified type  J06.9       3. Exposure to confirmed case of COVID-19  Z20.822 AMB POC COVID-19 COV      4. Skin lesion of face  L98.9 [facility]          In-clinic COVID test negative. DIARRHEA:  Given history, symptoms and exam, concern for community acquired GI infection. Increase intake of sport drinks or other electrolyte rich drinks, such as Gatorade, Pedialyte, or 50:50 apple juice diluted with water to help replenish lost fluids and electrolytes. Discussed bland/BRAT diet during this time with a slow progression back to normal diet. For diarrhea, do not use Imodium or Pepto-bismol to stop the diarrhea as it may prolong the illness - continue with increased fluids and bland diet. Hot compresses/heating pads over the stomach to help with cramping. If pain becomes severe, or localizes to the lower right side of the abdomen, if dark-tary stools develop, or if blood in noted in stool or vomit follow up with the emergency room. URI:  Given breadth of symptoms, exam findings, and lack of tonsillar or purulent findings, concern for viral URI. Recommend OTC treatment for symptoms:  ibuprofen (Advil, Motrin) and acetaminophen (Tylenol) for fevers and body aches. decongestants (specifically pseudoephedrine) <avoid if you have a history of high blood pressure or heart conditions>, along with antihistamines (Claritin, Zyrtec, Allegra) and nasal steroid sprays (Flonase) to help with nasal congestion and runny nose. throat sprays (Cepacol, chloraseptic) for throat pain.   dextromethorphan (Robitussin, Delsym), throat lozenges, or honey (1-2 teaspoons every hour)

## 2023-09-06 NOTE — PATIENT INSTRUCTIONS
In-clinic COVID test negative. Given history, symptoms and exam, concern for community acquired GI infection. Increase intake of sport drinks or other electrolyte rich drinks, such as Gatorade, Pedialyte, or 50:50 apple juice diluted with water to help replenish lost fluids and electrolytes. Discussed bland/BRAT diet during this time with a slow progression back to normal diet. For diarrhea, do not use Imodium or Pepto-bismol to stop the diarrhea as it may prolong the illness - continue with increased fluids and bland diet. Hot compresses/heating pads over the stomach to help with cramping. If pain becomes severe, or localizes to the lower right side of the abdomen, if dark-tary stools develop, or if blood in noted in stool or vomit follow up with the emergency room. Given breadth of symptoms, exam findings, and lack of tonsillar or purulent findings, concern for viral URI. Recommend OTC treatment for symptoms:  ibuprofen (Advil, Motrin) and acetaminophen (Tylenol) for fevers and body aches. decongestants (specifically pseudoephedrine) <avoid if you have a history of high blood pressure or heart conditions>, along with antihistamines (Claritin, Zyrtec, Allegra) and nasal steroid sprays (Flonase) to help with nasal congestion and runny nose. throat sprays (Cepacol, chloraseptic) for throat pain. dextromethorphan (Robitussin, Delsym), throat lozenges, or honey (1-2 teaspoons every hour) for cough. warm teas, humidifiers, nasal lavages, and sleeping in an inclined position are also helpful options that can lessen symptoms. Follow up in 7 days if symptoms persist or if symptoms worsen. Follow up with dermatology referral for facial lesion.

## 2024-02-29 ENCOUNTER — OFFICE VISIT (OUTPATIENT)
Dept: FAMILY MEDICINE CLINIC | Age: 39
End: 2024-02-29
Payer: COMMERCIAL

## 2024-02-29 VITALS
HEART RATE: 84 BPM | BODY MASS INDEX: 34.23 KG/M2 | SYSTOLIC BLOOD PRESSURE: 118 MMHG | WEIGHT: 186 LBS | OXYGEN SATURATION: 99 % | HEIGHT: 62 IN | DIASTOLIC BLOOD PRESSURE: 80 MMHG

## 2024-02-29 DIAGNOSIS — Z00.00 ENCOUNTER FOR WELL ADULT EXAM WITHOUT ABNORMAL FINDINGS: Primary | ICD-10-CM

## 2024-02-29 DIAGNOSIS — R21 RASH OF FACE: ICD-10-CM

## 2024-02-29 PROCEDURE — 99395 PREV VISIT EST AGE 18-39: CPT | Performed by: SURGERY

## 2024-02-29 SDOH — ECONOMIC STABILITY: INCOME INSECURITY: HOW HARD IS IT FOR YOU TO PAY FOR THE VERY BASICS LIKE FOOD, HOUSING, MEDICAL CARE, AND HEATING?: NOT HARD AT ALL

## 2024-02-29 SDOH — ECONOMIC STABILITY: FOOD INSECURITY: WITHIN THE PAST 12 MONTHS, THE FOOD YOU BOUGHT JUST DIDN'T LAST AND YOU DIDN'T HAVE MONEY TO GET MORE.: NEVER TRUE

## 2024-02-29 SDOH — ECONOMIC STABILITY: FOOD INSECURITY: WITHIN THE PAST 12 MONTHS, YOU WORRIED THAT YOUR FOOD WOULD RUN OUT BEFORE YOU GOT MONEY TO BUY MORE.: NEVER TRUE

## 2024-02-29 ASSESSMENT — PATIENT HEALTH QUESTIONNAIRE - PHQ9
SUM OF ALL RESPONSES TO PHQ QUESTIONS 1-9: 0
8. MOVING OR SPEAKING SO SLOWLY THAT OTHER PEOPLE COULD HAVE NOTICED. OR THE OPPOSITE, BEING SO FIGETY OR RESTLESS THAT YOU HAVE BEEN MOVING AROUND A LOT MORE THAN USUAL: 0
6. FEELING BAD ABOUT YOURSELF - OR THAT YOU ARE A FAILURE OR HAVE LET YOURSELF OR YOUR FAMILY DOWN: 0
SUM OF ALL RESPONSES TO PHQ QUESTIONS 1-9: 0
7. TROUBLE CONCENTRATING ON THINGS, SUCH AS READING THE NEWSPAPER OR WATCHING TELEVISION: 0
4. FEELING TIRED OR HAVING LITTLE ENERGY: 0
2. FEELING DOWN, DEPRESSED OR HOPELESS: 0
SUM OF ALL RESPONSES TO PHQ QUESTIONS 1-9: 0
9. THOUGHTS THAT YOU WOULD BE BETTER OFF DEAD, OR OF HURTING YOURSELF: 0
10. IF YOU CHECKED OFF ANY PROBLEMS, HOW DIFFICULT HAVE THESE PROBLEMS MADE IT FOR YOU TO DO YOUR WORK, TAKE CARE OF THINGS AT HOME, OR GET ALONG WITH OTHER PEOPLE: 0
SUM OF ALL RESPONSES TO PHQ QUESTIONS 1-9: 0
1. LITTLE INTEREST OR PLEASURE IN DOING THINGS: 0
3. TROUBLE FALLING OR STAYING ASLEEP: 0

## 2024-02-29 ASSESSMENT — ENCOUNTER SYMPTOMS
COUGH: 0
SORE THROAT: 0
SHORTNESS OF BREATH: 0
ABDOMINAL PAIN: 0
DIARRHEA: 0
NAUSEA: 0
CONSTIPATION: 0

## 2024-02-29 NOTE — PROGRESS NOTES
mouth every morning Yes Vasquez Zapien DO   ibuprofen (ADVIL;MOTRIN) 800 MG tablet Take 1 tablet by mouth daily as needed for Pain (prn) Yes Danita Zhang APRN - CNP   albuterol sulfate HFA (VENTOLIN HFA) 108 (90 Base) MCG/ACT inhaler Inhale 2 puffs into the lungs every 6 hours as needed for Wheezing or Shortness of Breath Yes Jossue Jacobsen MD         Past Medical History:   Diagnosis Date    ADHD (attention deficit hyperactivity disorder)     Allergic rhinitis 07/25/2019    Asthma     Class 1 obesity in adult 1/28/2022    COVID-19 08/03/2021    Dysthymia 3/14/2023    Intractable migraine with aura without status migrainosus 3/14/2023    Kidney calculi     PALOMA (obstructive sleep apnea) 1/28/2022    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     Seizures (HCC)        Past Surgical History:   Procedure Laterality Date    CHOLECYSTECTOMY      TUBAL LIGATION           Family History   Problem Relation Age of Onset    Other Mother         Hypoglycemia    Other Maternal Grandmother         Thyroid issues     Cancer Maternal Grandmother         Bone cancer     Diabetes Maternal Grandmother     Cancer Maternal Grandfather         Bone cancer     Diabetes Sister        Social History     Tobacco Use    Smoking status: Never    Smokeless tobacco: Never   Vaping Use    Vaping Use: Never used   Substance Use Topics    Alcohol use: No    Drug use: No       Objective     Vital Signs  /80 (Site: Left Upper Arm, Position: Sitting, Cuff Size: Medium Adult)   Pulse 84   Ht 1.575 m (5' 2\")   Wt 84.4 kg (186 lb)   LMP 02/02/2024   SpO2 99%   BMI 34.02 kg/m²   Wt Readings from Last 3 Encounters:   02/29/24 84.4 kg (186 lb)   09/06/23 81.6 kg (180 lb)   05/15/23 81.6 kg (180 lb)       Waist Circumference  There were no vitals filed for this visit.    Physical Exam  Constitutional:       Appearance: Normal appearance.   HENT:      Nose: No rhinorrhea.   Eyes:      General: No scleral icterus.     Extraocular

## 2024-03-06 DIAGNOSIS — Z00.00 ENCOUNTER FOR WELL ADULT EXAM WITHOUT ABNORMAL FINDINGS: ICD-10-CM

## 2024-03-06 LAB
ALBUMIN SERPL-MCNC: 4.5 G/DL (ref 3.4–5)
ALBUMIN/GLOB SERPL: 1.7 {RATIO} (ref 1.1–2.2)
ALP SERPL-CCNC: 82 U/L (ref 40–129)
ALT SERPL-CCNC: 14 U/L (ref 10–40)
ANION GAP SERPL CALCULATED.3IONS-SCNC: 8 MMOL/L (ref 3–16)
AST SERPL-CCNC: 17 U/L (ref 15–37)
BASOPHILS # BLD: 0.1 K/UL (ref 0–0.2)
BASOPHILS NFR BLD: 0.6 %
BILIRUB SERPL-MCNC: 0.7 MG/DL (ref 0–1)
BUN SERPL-MCNC: 12 MG/DL (ref 7–20)
CALCIUM SERPL-MCNC: 10.1 MG/DL (ref 8.3–10.6)
CHLORIDE SERPL-SCNC: 105 MMOL/L (ref 99–110)
CHOLEST SERPL-MCNC: 173 MG/DL (ref 0–199)
CO2 SERPL-SCNC: 25 MMOL/L (ref 21–32)
CREAT SERPL-MCNC: 0.7 MG/DL (ref 0.6–1.1)
DEPRECATED RDW RBC AUTO: 13.5 % (ref 12.4–15.4)
EOSINOPHIL # BLD: 0.1 K/UL (ref 0–0.6)
EOSINOPHIL NFR BLD: 0.7 %
GFR SERPLBLD CREATININE-BSD FMLA CKD-EPI: >60 ML/MIN/{1.73_M2}
GLUCOSE SERPL-MCNC: 81 MG/DL (ref 70–99)
HCT VFR BLD AUTO: 40.2 % (ref 36–48)
HDLC SERPL-MCNC: 42 MG/DL (ref 40–60)
HGB BLD-MCNC: 13.9 G/DL (ref 12–16)
LDL CHOLESTEROL CALCULATED: 110 MG/DL
LYMPHOCYTES # BLD: 1.9 K/UL (ref 1–5.1)
LYMPHOCYTES NFR BLD: 22.3 %
MCH RBC QN AUTO: 30.7 PG (ref 26–34)
MCHC RBC AUTO-ENTMCNC: 34.6 G/DL (ref 31–36)
MCV RBC AUTO: 88.6 FL (ref 80–100)
MONOCYTES # BLD: 0.4 K/UL (ref 0–1.3)
MONOCYTES NFR BLD: 5 %
NEUTROPHILS # BLD: 6.2 K/UL (ref 1.7–7.7)
NEUTROPHILS NFR BLD: 71.4 %
PLATELET # BLD AUTO: 305 K/UL (ref 135–450)
PMV BLD AUTO: 8 FL (ref 5–10.5)
POTASSIUM SERPL-SCNC: 4.7 MMOL/L (ref 3.5–5.1)
PROT SERPL-MCNC: 7.2 G/DL (ref 6.4–8.2)
RBC # BLD AUTO: 4.53 M/UL (ref 4–5.2)
SODIUM SERPL-SCNC: 138 MMOL/L (ref 136–145)
TRIGL SERPL-MCNC: 105 MG/DL (ref 0–150)
TSH SERPL DL<=0.005 MIU/L-ACNC: 1.04 UIU/ML (ref 0.27–4.2)
VLDLC SERPL CALC-MCNC: 21 MG/DL
WBC # BLD AUTO: 8.7 K/UL (ref 4–11)

## 2024-03-14 ENCOUNTER — OFFICE VISIT (OUTPATIENT)
Dept: URGENT CARE | Age: 39
End: 2024-03-14

## 2024-03-14 VITALS
HEART RATE: 69 BPM | TEMPERATURE: 98 F | RESPIRATION RATE: 14 BRPM | WEIGHT: 172 LBS | OXYGEN SATURATION: 98 % | SYSTOLIC BLOOD PRESSURE: 136 MMHG | BODY MASS INDEX: 31.46 KG/M2 | DIASTOLIC BLOOD PRESSURE: 87 MMHG

## 2024-03-14 DIAGNOSIS — J06.9 VIRAL URI: ICD-10-CM

## 2024-03-14 DIAGNOSIS — J02.9 SORE THROAT: Primary | ICD-10-CM

## 2024-03-14 LAB — STREPTOCOCCUS A RNA: NORMAL

## 2024-03-14 RX ORDER — BROMPHENIRAMINE MALEATE, PSEUDOEPHEDRINE HYDROCHLORIDE, AND DEXTROMETHORPHAN HYDROBROMIDE 2; 30; 10 MG/5ML; MG/5ML; MG/5ML
5 SYRUP ORAL 4 TIMES DAILY PRN
Qty: 120 ML | Refills: 0 | Status: SHIPPED | OUTPATIENT
Start: 2024-03-14

## 2024-03-14 RX ORDER — CETIRIZINE HYDROCHLORIDE 10 MG/1
10 TABLET ORAL DAILY
Qty: 30 TABLET | Refills: 0 | Status: SHIPPED | OUTPATIENT
Start: 2024-03-14

## 2024-03-14 ASSESSMENT — ENCOUNTER SYMPTOMS
EYE PAIN: 0
ABDOMINAL PAIN: 0
EYE REDNESS: 0
NAUSEA: 0
SHORTNESS OF BREATH: 0
SORE THROAT: 1
EYE DISCHARGE: 0
SINUS PRESSURE: 0
COUGH: 0
DIARRHEA: 0
VOMITING: 0

## 2024-03-14 NOTE — PROGRESS NOTES
regular rhythm.      Pulses: Normal pulses.      Heart sounds: Normal heart sounds.   Pulmonary:      Effort: Pulmonary effort is normal. No tachypnea, bradypnea, accessory muscle usage, prolonged expiration, respiratory distress or retractions. She is not intubated.      Breath sounds: Normal breath sounds and air entry. No stridor, decreased air movement or transmitted upper airway sounds. No decreased breath sounds, wheezing, rhonchi or rales.   Musculoskeletal:      Cervical back: Normal range of motion and neck supple.   Lymphadenopathy:      Cervical: No cervical adenopathy.   Skin:     General: Skin is warm and dry.   Neurological:      General: No focal deficit present.      Mental Status: She is alert and oriented to person, place, and time.   Psychiatric:         Mood and Affect: Mood normal.         Behavior: Behavior normal.         Thought Content: Thought content normal.         Judgment: Judgment normal.         PROCEDURES:  Unless otherwise noted below, none     Procedures    RESULTS:  Results for POC orders placed in visit on 03/14/24   POCT Rapid Strep A DNA (Alere i)   Result Value Ref Range    Streptococcus A RNA negaitve        An electronic signature was used to authenticate this note.    --Raya Hernandez PA-C

## 2024-03-17 LAB — BACTERIA THROAT AEROBE CULT: NORMAL

## 2024-06-28 ENCOUNTER — OFFICE VISIT (OUTPATIENT)
Dept: FAMILY MEDICINE CLINIC | Age: 39
End: 2024-06-28
Payer: COMMERCIAL

## 2024-06-28 ENCOUNTER — HOSPITAL ENCOUNTER (OUTPATIENT)
Dept: GENERAL RADIOLOGY | Age: 39
Discharge: HOME | End: 2024-06-28
Payer: COMMERCIAL

## 2024-06-28 VITALS
BODY MASS INDEX: 34.16 KG/M2 | HEART RATE: 50 BPM | HEIGHT: 62 IN | DIASTOLIC BLOOD PRESSURE: 82 MMHG | SYSTOLIC BLOOD PRESSURE: 129 MMHG | OXYGEN SATURATION: 100 % | WEIGHT: 185.6 LBS

## 2024-06-28 DIAGNOSIS — M79.642 HAND PAIN, LEFT: Primary | ICD-10-CM

## 2024-06-28 DIAGNOSIS — M79.642 HAND PAIN, LEFT: ICD-10-CM

## 2024-06-28 DIAGNOSIS — S63.92XA SPRAIN, HAND, LEFT, INITIAL ENCOUNTER: ICD-10-CM

## 2024-06-28 PROCEDURE — 99213 OFFICE O/P EST LOW 20 MIN: CPT | Performed by: SURGERY

## 2024-06-28 PROCEDURE — 73130 X-RAY EXAM OF HAND: CPT

## 2024-06-28 NOTE — PROGRESS NOTES
Hives, Itching and Nausea Only    Vicodin [Hydrocodone-Acetaminophen] Other (See Comments)     Low HR     Codeine Palpitations       Social History     Tobacco Use    Smoking status: Never    Smokeless tobacco: Never   Substance Use Topics    Alcohol use: No       /82 (Site: Right Upper Arm, Position: Sitting, Cuff Size: Medium Adult)   Pulse 50   Ht 1.575 m (5' 2\")   Wt 84.2 kg (185 lb 9.6 oz)   LMP 06/09/2024   SpO2 100%   BMI 33.95 kg/m²     Physical Exam  Vitals reviewed.   Constitutional:       Appearance: Normal appearance.   Musculoskeletal:         General: Swelling, tenderness and signs of injury (bruising hypothenar eminence) present. No deformity.   Skin:     Capillary Refill: Capillary refill takes 2 to 3 seconds. Left hand, due to swelling  Neurological:      Mental Status: She is alert.      Sensory: No sensory deficit (left hand or forearm).         Assessment and Plan  1. Hand pain, left  - no fx per my review, awaiting official read  - XR HAND LEFT (MIN 3 VIEWS); Future    2. Sprain, hand, left, initial encounter  - continue with wrapping, RICE, NSAIDs      No orders of the defined types were placed in this encounter.      Return if symptoms worsen or fail to improve.    Dalila Lopez, DO

## 2024-08-29 ENCOUNTER — OFFICE VISIT (OUTPATIENT)
Dept: URGENT CARE | Age: 39
End: 2024-08-29

## 2024-08-29 VITALS
HEIGHT: 62 IN | WEIGHT: 189 LBS | BODY MASS INDEX: 34.78 KG/M2 | HEART RATE: 65 BPM | DIASTOLIC BLOOD PRESSURE: 78 MMHG | SYSTOLIC BLOOD PRESSURE: 118 MMHG | TEMPERATURE: 99 F | OXYGEN SATURATION: 97 %

## 2024-08-29 DIAGNOSIS — R06.02 SOB (SHORTNESS OF BREATH): ICD-10-CM

## 2024-08-29 DIAGNOSIS — R05.1 ACUTE COUGH: ICD-10-CM

## 2024-08-29 DIAGNOSIS — J18.9 PNEUMONIA OF RIGHT MIDDLE LOBE DUE TO INFECTIOUS ORGANISM: Primary | ICD-10-CM

## 2024-08-29 LAB
Lab: NORMAL
PERFORMING INSTRUMENT: NORMAL
QC PASS/FAIL: NORMAL
SARS-COV-2, POC: NORMAL

## 2024-08-29 RX ORDER — DOXYCYCLINE HYCLATE 100 MG
100 TABLET ORAL 2 TIMES DAILY
Qty: 14 TABLET | Refills: 0 | Status: SHIPPED | OUTPATIENT
Start: 2024-08-29 | End: 2024-09-05

## 2024-08-29 RX ORDER — BENZONATATE 100 MG/1
100 CAPSULE ORAL 3 TIMES DAILY PRN
Qty: 30 CAPSULE | Refills: 0 | Status: SHIPPED | OUTPATIENT
Start: 2024-08-29 | End: 2024-09-08

## 2024-08-29 RX ORDER — PREDNISONE 20 MG/1
40 TABLET ORAL DAILY
Qty: 10 TABLET | Refills: 0 | Status: SHIPPED | OUTPATIENT
Start: 2024-08-29 | End: 2024-09-03

## 2024-08-29 NOTE — PATIENT INSTRUCTIONS
CXR showed some questionable airspace disease/pneumonia. Very minimal if present.     Take any prescribed medications as directed.     You may additionally take over-the-counter antihistamine-decongestant combination such as Allegra-D, Zyrtec-D, or Claritin-D. Coricidin if you have high blood pressure.     Alternate Tylenol and Motrin every 4 hours as directed as needed for chills, fever.     Follow-up with your primary care physician or return if not improved.     Go to the ER if you develop significant shortness of breath, difficulty breathing, high fever, chest pain or other major concerning symptoms.

## 2024-08-29 NOTE — PROGRESS NOTES
Christine Hargrove (:  1985) is a 39 y.o. female,  here for evaluation of the following chief complaint(s): Cough and Congestion (Exposure to stomach virus and covid)    Christine Hargrove is a Established patient.   I have reviewed the patient's medications; see Medication Reconciliation.    ASSESSMENT/PLAN:  Diagnosis:     ICD-10-CM    1. Pneumonia of right middle lobe due to infectious organism  J18.9 doxycycline hyclate (VIBRA-TABS) 100 MG tablet      2. Acute cough  R05.1 POCT COVID-19, Antigen     XR CHEST STANDARD (2 VW)     benzonatate (TESSALON) 100 MG capsule     predniSONE (DELTASONE) 20 MG tablet      3. SOB (shortness of breath)  R06.02 POCT COVID-19, Antigen     XR CHEST STANDARD (2 VW)     doxycycline hyclate (VIBRA-TABS) 100 MG tablet            Patient was seen at Urgent Care today for productive cough; sinus/nasal congestion; SOB; malaise; myalgias; x 3 day(s)   Pt Confirms significant respiratory history: Asthma and previous pneumonias.     Based on history and physical examination, differential diagnosis includes but is not limited to: viral uri, allergies, sinusitis, bronchitis, Covid, flu, pneumonia.     On presentation, pt was afrebile, not tachycardic or hypoxic. Lungs clear.   Chest Xray is obtained to evaluate for possible pneumonia and/or other acute pulmonary process.        COVID was obtained and was NEGATIVE. Patient reassured.     CXR showed some mild atelectasis vs airspace disease. Given her symptoms and history of prior pneumonia, I will cover for possible airspace disease/pneumonia with doxycycline.     Pt is recommended symptomatic care with OTC antihistamine-decongestants (Claritin, Zyrtec, Allegra) along with nasal spray such as Afrin, Flonase, or Nasonex as directed.  Prescription(s) provided: Prednisone, Doxycycline, and Tessalon Pearls along with doxycycline.     Patient was discharged home with follow-up and return precautions.    Patient's initial blood pressure was

## 2024-09-30 ENCOUNTER — OFFICE VISIT (OUTPATIENT)
Dept: URGENT CARE | Age: 39
End: 2024-09-30

## 2024-09-30 VITALS
SYSTOLIC BLOOD PRESSURE: 112 MMHG | WEIGHT: 196.3 LBS | HEART RATE: 73 BPM | BODY MASS INDEX: 34.78 KG/M2 | OXYGEN SATURATION: 99 % | TEMPERATURE: 97.9 F | HEIGHT: 63 IN | DIASTOLIC BLOOD PRESSURE: 74 MMHG

## 2024-09-30 DIAGNOSIS — M54.32 SCIATICA OF LEFT SIDE: Primary | ICD-10-CM

## 2024-09-30 RX ORDER — NAPROXEN 500 MG/1
500 TABLET ORAL 2 TIMES DAILY WITH MEALS
Qty: 10 TABLET | Refills: 0 | Status: SHIPPED | OUTPATIENT
Start: 2024-09-30 | End: 2024-10-05

## 2024-09-30 RX ORDER — METHOCARBAMOL 750 MG/1
750 TABLET, FILM COATED ORAL 3 TIMES DAILY
Qty: 15 TABLET | Refills: 0 | Status: SHIPPED | OUTPATIENT
Start: 2024-09-30 | End: 2024-10-05

## 2024-09-30 RX ORDER — PREDNISONE 20 MG/1
20 TABLET ORAL 2 TIMES DAILY
Qty: 10 TABLET | Refills: 0 | Status: SHIPPED | OUTPATIENT
Start: 2024-09-30 | End: 2024-10-05

## 2024-09-30 NOTE — PROGRESS NOTES
Christine Hargrove (:  1985) is a 39 y.o. female,  here for evaluation of the following chief complaint(s): Flank Pain (Left side pain, goes down the leg, numbness and tingling in leg. Started Thursday )      Christine Hargrove is a Established patient.   Last Urgent Care Visit: 2024  I have reviewed the patient's medications; see Medication Reconciliation.    ASSESSMENT/PLAN:  Diagnosis:     ICD-10-CM    1. Sciatica of left side  M54.32 predniSONE (DELTASONE) 20 MG tablet     methocarbamol (ROBAXIN) 750 MG tablet     naproxen (NAPROSYN) 500 MG tablet             Medical Decision Making:    Patient was seen at Urgent Care today for LLE radicular pain x 4-5 days. Began Wednesday into Thursday and has worsened since. Now constant, sharp, shooting pain.     Presentation is consistent with sciatica, lumbar radiculopathy.   Based on history and physical exam there is no concern for cauda equina or other acute neurosurgical etiology at this time.     Pt is prescribed Naproxen, Robaxin, and prednisone.  She is also advised symptomatic care with ice and activity as tolerated.   She will follow-up with PCP or ortho if not improved.     Patient was discharged home with follow-up and return precautions.     Patient did not have elevated blood pressure greater than 120/80. Therefore, referral to PCP for HTN is not indicated    No orders of the defined types were placed in this encounter.      Results:  No results found for any visits on 24.       SUBJECTIVE/OBJECTIVE:  HPI:   This is a 39 y.o. female that presents today with complaint of: radicular left lower extremity pain x 4-5 days.     Patient has not had an injury. 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 She has had some mild, dull pain in the past but has never had significant sharp, shooting pain.    Pain is a sharp, shooting pain that begins in the left low back and hip area. It radiates down the buttock into the

## 2024-09-30 NOTE — PATIENT INSTRUCTIONS
Take medication as prescribed:      Apply ice and/or heat to the affected area x 20 minutes as needed.     Activity as tolerated. Try to maintain tolerable, light daily activity. Bedrest tends to just make symptoms worse.     Go to the ER if you develop new significant urinary incontinence, complete numbness of the pelvis/genitals, loss of function of a lower extremity, fever or other concerning symptoms.     Otherwise, follow-up with primary care and/or spine specialist.     [facility] -- Orthopedics and Sports Medicine   [phone]

## 2024-10-06 ENCOUNTER — OFFICE VISIT (OUTPATIENT)
Dept: URGENT CARE | Age: 39
End: 2024-10-06

## 2024-10-06 VITALS
HEART RATE: 57 BPM | WEIGHT: 185 LBS | SYSTOLIC BLOOD PRESSURE: 115 MMHG | OXYGEN SATURATION: 99 % | HEIGHT: 63 IN | BODY MASS INDEX: 32.78 KG/M2 | DIASTOLIC BLOOD PRESSURE: 78 MMHG | TEMPERATURE: 98 F

## 2024-10-06 DIAGNOSIS — R53.83 MALAISE AND FATIGUE: ICD-10-CM

## 2024-10-06 DIAGNOSIS — R07.9 CHEST PAIN, UNSPECIFIED TYPE: Primary | ICD-10-CM

## 2024-10-06 DIAGNOSIS — R53.81 MALAISE AND FATIGUE: ICD-10-CM

## 2024-10-06 DIAGNOSIS — R06.02 SHORTNESS OF BREATH: ICD-10-CM

## 2024-10-14 DIAGNOSIS — R53.81 MALAISE AND FATIGUE: ICD-10-CM

## 2024-10-14 DIAGNOSIS — R53.83 MALAISE AND FATIGUE: ICD-10-CM

## 2024-10-15 LAB — TSH SERPL DL<=0.005 MIU/L-ACNC: 1.46 UIU/ML (ref 0.27–4.2)

## 2024-12-19 ENCOUNTER — OFFICE VISIT (OUTPATIENT)
Dept: URGENT CARE | Age: 39
End: 2024-12-19

## 2024-12-19 DIAGNOSIS — J45.41 MODERATE PERSISTENT REACTIVE AIRWAY DISEASE WITH ACUTE EXACERBATION: ICD-10-CM

## 2024-12-19 DIAGNOSIS — Z91.89 AT RISK FOR PNEUMONIA: ICD-10-CM

## 2024-12-19 DIAGNOSIS — J01.00 ACUTE NON-RECURRENT MAXILLARY SINUSITIS: Primary | ICD-10-CM

## 2024-12-19 DIAGNOSIS — Z20.89 EXPOSURE TO PNEUMONIA: ICD-10-CM

## 2024-12-19 RX ORDER — BENZONATATE 100 MG/1
100-200 CAPSULE ORAL 3 TIMES DAILY PRN
Qty: 60 CAPSULE | Refills: 0 | Status: SHIPPED | OUTPATIENT
Start: 2024-12-19 | End: 2024-12-29

## 2024-12-19 RX ORDER — FLUTICASONE PROPIONATE 50 MCG
2 SPRAY, SUSPENSION (ML) NASAL DAILY
Qty: 1 EACH | Refills: 1 | Status: SHIPPED | OUTPATIENT
Start: 2024-12-19

## 2024-12-19 RX ORDER — ALBUTEROL SULFATE 90 UG/1
2 INHALANT RESPIRATORY (INHALATION) EVERY 6 HOURS PRN
Qty: 18 G | Refills: 3 | Status: SHIPPED | OUTPATIENT
Start: 2024-12-19

## 2024-12-19 RX ORDER — PREDNISONE 20 MG/1
20 TABLET ORAL 2 TIMES DAILY
Qty: 10 TABLET | Refills: 0 | Status: SHIPPED | OUTPATIENT
Start: 2024-12-19 | End: 2024-12-24

## 2024-12-19 RX ORDER — AZITHROMYCIN 250 MG/1
TABLET, FILM COATED ORAL
Qty: 6 TABLET | Refills: 0 | Status: SHIPPED | OUTPATIENT
Start: 2024-12-19 | End: 2024-12-29

## 2024-12-19 ASSESSMENT — ENCOUNTER SYMPTOMS
SORE THROAT: 1
COUGH: 1
CHEST TIGHTNESS: 1
SHORTNESS OF BREATH: 1
VOICE CHANGE: 1

## 2024-12-20 VITALS
TEMPERATURE: 98 F | DIASTOLIC BLOOD PRESSURE: 80 MMHG | SYSTOLIC BLOOD PRESSURE: 126 MMHG | BODY MASS INDEX: 32.25 KG/M2 | WEIGHT: 182 LBS | HEART RATE: 62 BPM | HEIGHT: 63 IN | OXYGEN SATURATION: 99 %

## 2024-12-20 NOTE — PROGRESS NOTES
Musculoskeletal:      Cervical back: Full passive range of motion without pain.      Right lower leg: No edema.      Left lower leg: No edema.   Lymphadenopathy:      Cervical: No cervical adenopathy.   Skin:     General: Skin is warm and dry.      Capillary Refill: Capillary refill takes less than 2 seconds.   Neurological:      Mental Status: She is alert and oriented to person, place, and time.   Psychiatric:         Mood and Affect: Mood normal.         Behavior: Behavior is cooperative.       PROCEDURES:  Unless otherwise noted below, none     Procedures    RESULTS:  No results found for this visit on 12/19/24.  An electronic signature was used to authenticate this note.    --Dalila Huynh, AMBER - CNP

## 2024-12-20 NOTE — PATIENT INSTRUCTIONS
Reactive airway with exacerbation secondary to Maxillary Sinusitis with increased risk for pneumonia  Azithromycin  is prescribed for antibiotic treatment of the sinus infection  Take the antibiotics until completed, do not stop unless otherwise directed by a healthcare provider.  Recommend daily yogurt or other probiotics while on antibiotics.  Prednisone   is prescribed to help manage the Eustachian tube dysfunction associated with the sinus infection and seasonal allergies  Benzonatate  prescribed for cough relief.  Flonase   prescribed to help with the nasal drainage and cough   Albuterol prescribed to help with the reactive airway exacerbation and bronchospasms   Recommend OTC treatment for symptoms:   acetaminophen (Tylenol) for fevers and pain relief.   Vicks vapor rub for nasal congestion and cough  Nasal saline mist sprays to loosen and clear congestion  throat sprays (Cepacol, chloraseptic) for throat pain.  honey (1-2 teaspoons every hour) for relief of throat irritation and coughing fits.  warm teas, humidifiers, nasal lavages, and sleeping in an inclined position are also helpful options that can lessen symptoms.  Recommend warm compresses over the symptomatic ear(s) for 10-15 minutes, or a hot shower, followed by 1-2 minutes of massaging the area behind your ears and down the jaw-line to help with the ear congestion/ear pressure.

## 2025-01-08 ENCOUNTER — HOSPITAL ENCOUNTER (OUTPATIENT)
Age: 40
Discharge: HOME | End: 2025-01-08
Payer: COMMERCIAL

## 2025-01-08 ENCOUNTER — HOSPITAL ENCOUNTER (OUTPATIENT)
Dept: GENERAL RADIOLOGY | Age: 40
Discharge: HOME | End: 2025-01-08
Payer: COMMERCIAL

## 2025-01-08 ENCOUNTER — OFFICE VISIT (OUTPATIENT)
Dept: PULMONOLOGY | Age: 40
End: 2025-01-08
Payer: COMMERCIAL

## 2025-01-08 VITALS
HEART RATE: 79 BPM | BODY MASS INDEX: 34.98 KG/M2 | SYSTOLIC BLOOD PRESSURE: 122 MMHG | HEIGHT: 63 IN | WEIGHT: 197.4 LBS | RESPIRATION RATE: 16 BRPM | DIASTOLIC BLOOD PRESSURE: 78 MMHG | OXYGEN SATURATION: 96 %

## 2025-01-08 DIAGNOSIS — J98.8 RESPIRATORY INFECTION: Primary | ICD-10-CM

## 2025-01-08 DIAGNOSIS — R06.00 DYSPNEA, UNSPECIFIED TYPE: ICD-10-CM

## 2025-01-08 DIAGNOSIS — R93.89 ABNORMAL CHEST X-RAY: ICD-10-CM

## 2025-01-08 PROCEDURE — 71046 X-RAY EXAM CHEST 2 VIEWS: CPT

## 2025-01-08 PROCEDURE — 99244 OFF/OP CNSLTJ NEW/EST MOD 40: CPT | Performed by: INTERNAL MEDICINE

## 2025-01-08 RX ORDER — MONTELUKAST SODIUM 10 MG/1
10 TABLET ORAL DAILY
Qty: 30 TABLET | Refills: 3 | Status: SHIPPED | OUTPATIENT
Start: 2025-01-08

## 2025-01-08 NOTE — PROGRESS NOTES
[facility]                                                                CHIEF COMPLAINT: Shortness of breath    Consulting provider: Vasquez Zapien DO      HPI:   Dyspnea on exertion for the past 1 year. Worse with exertion and better with resting. No sputum. No hemoptysis. At times with wheezes. Asthma as childhood. Born prematurely. Used IBD as a child. Never smoked. Son has asthma and mother aslo has asthma   Seasonal allergy/trigger: change in weather, fresh cut grass, mold, dust mites, weeds, animals, trees, smoke, pollens, airborne chemicals and dust---> sneezing, watery eyes, runny nose, itchy nose, SOB, cough and wheezes  Prior records reviewed by me and summarized.    Past Medical History:   Diagnosis Date    ADHD (attention deficit hyperactivity disorder)     Allergic rhinitis 07/25/2019    Asthma     Class 1 obesity in adult 1/28/2022    COVID-19 08/03/2021    Dysthymia 3/14/2023    Intractable migraine with aura without status migrainosus 3/14/2023    Kidney calculi     PALOMA (obstructive sleep apnea) 1/28/2022    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     Seizures (HCC)        Past Surgical History:        Procedure Laterality Date    CHOLECYSTECTOMY      TUBAL LIGATION         Allergies:  is allergic to dye [iodides], morphine, sulfa antibiotics, vicodin [hydrocodone-acetaminophen], codeine, and penicillins.  Social History:    TOBACCO:   reports that she has never smoked. She has never used smokeless tobacco.  ETOH:   reports no history of alcohol use.      Family History:       Problem Relation Age of Onset    Other Mother         Hypoglycemia    Other Maternal Grandmother         Thyroid issues     Cancer Maternal Grandmother         Bone cancer     Diabetes Maternal Grandmother     Cancer Maternal Grandfather         Bone cancer     Diabetes Sister        Current Medications:    Current Outpatient Medications:

## 2025-01-29 ENCOUNTER — OFFICE VISIT (OUTPATIENT)
Dept: URGENT CARE | Age: 40
End: 2025-01-29

## 2025-01-29 VITALS
HEART RATE: 70 BPM | WEIGHT: 199 LBS | SYSTOLIC BLOOD PRESSURE: 132 MMHG | TEMPERATURE: 98 F | DIASTOLIC BLOOD PRESSURE: 70 MMHG | OXYGEN SATURATION: 98 % | HEIGHT: 63 IN | BODY MASS INDEX: 35.26 KG/M2

## 2025-01-29 DIAGNOSIS — R50.9 FEVER, UNSPECIFIED FEVER CAUSE: Primary | ICD-10-CM

## 2025-01-29 DIAGNOSIS — J06.9 VIRAL URI WITH COUGH: ICD-10-CM

## 2025-01-29 LAB
INFLUENZA A ANTIGEN, POC: NEGATIVE
INFLUENZA B ANTIGEN, POC: NEGATIVE

## 2025-01-29 RX ORDER — FLUTICASONE PROPIONATE 50 MCG
2 SPRAY, SUSPENSION (ML) NASAL DAILY
Qty: 48 G | Refills: 0 | Status: SHIPPED | OUTPATIENT
Start: 2025-01-29

## 2025-01-29 RX ORDER — OSELTAMIVIR PHOSPHATE 75 MG/1
75 CAPSULE ORAL 2 TIMES DAILY
Qty: 10 CAPSULE | Refills: 0 | Status: SHIPPED | OUTPATIENT
Start: 2025-01-29 | End: 2025-02-03

## 2025-01-29 RX ORDER — DEXTROMETHORPHAN HYDROBROMIDE AND PROMETHAZINE HYDROCHLORIDE 15; 6.25 MG/5ML; MG/5ML
5 SYRUP ORAL 4 TIMES DAILY PRN
Qty: 120 ML | Refills: 0 | Status: SHIPPED | OUTPATIENT
Start: 2025-01-29 | End: 2025-02-05

## 2025-01-29 ASSESSMENT — ENCOUNTER SYMPTOMS
EYE DISCHARGE: 0
COUGH: 1
DIARRHEA: 0
NAUSEA: 0
SORE THROAT: 1
EYE PAIN: 0
SHORTNESS OF BREATH: 0
VOMITING: 0
ABDOMINAL PAIN: 0
EYE REDNESS: 0

## 2025-01-29 NOTE — PROGRESS NOTES
Pt provided HPI by themself - pt considered to be a reliable historian.        History provided by:  Patient   used: No        VITAL SIGNS  Vitals:    01/29/25 1722   BP: 132/70   Pulse: 70   Temp: 98 °F (36.7 °C)   TempSrc: Oral   SpO2: 98%   Weight: 90.3 kg (199 lb)   Height: 1.6 m (5' 3\")       Review of Systems   Constitutional:  Positive for chills and fatigue. Negative for fever.   HENT:  Positive for congestion and sore throat.    Eyes:  Negative for pain, discharge, redness and visual disturbance.   Respiratory:  Positive for cough. Negative for shortness of breath.    Cardiovascular:  Negative for chest pain.   Gastrointestinal:  Negative for abdominal pain, diarrhea, nausea and vomiting.   Skin:  Negative for rash.   Neurological:  Positive for headaches. Negative for dizziness.   Psychiatric/Behavioral:  Negative for confusion.      Pertinent positives and negatives as above, or may be included within the HPI.    Physical Exam  Vitals and nursing note reviewed.   Constitutional:       General: She is not in acute distress.     Appearance: Normal appearance. She is not ill-appearing, toxic-appearing or diaphoretic.      Interventions: She is not intubated.  HENT:      Head: Normocephalic and atraumatic.      Right Ear: Hearing, tympanic membrane, ear canal and external ear normal. There is no impacted cerumen.      Left Ear: Hearing, tympanic membrane, ear canal and external ear normal. There is no impacted cerumen.      Nose: Congestion present.      Mouth/Throat:      Lips: Pink. No lesions.      Mouth: Mucous membranes are moist. No injury, lacerations, oral lesions or angioedema.      Tongue: No lesions. Tongue does not deviate from midline.      Palate: No mass and lesions.      Pharynx: Oropharynx is clear. Uvula midline. No pharyngeal swelling, oropharyngeal exudate, posterior oropharyngeal erythema, uvula swelling or postnasal drip.      Tonsils: No tonsillar exudate or

## 2025-04-07 ENCOUNTER — OFFICE VISIT (OUTPATIENT)
Dept: FAMILY MEDICINE CLINIC | Age: 40
End: 2025-04-07
Payer: COMMERCIAL

## 2025-04-07 VITALS
SYSTOLIC BLOOD PRESSURE: 106 MMHG | OXYGEN SATURATION: 98 % | HEART RATE: 73 BPM | RESPIRATION RATE: 16 BRPM | TEMPERATURE: 97.5 F | WEIGHT: 186.5 LBS | DIASTOLIC BLOOD PRESSURE: 72 MMHG | BODY MASS INDEX: 33.04 KG/M2

## 2025-04-07 DIAGNOSIS — N92.6 MISSED MENSES: Primary | ICD-10-CM

## 2025-04-07 DIAGNOSIS — R31.9 HEMATURIA, UNSPECIFIED TYPE: ICD-10-CM

## 2025-04-07 DIAGNOSIS — R10.9 FLANK PAIN: ICD-10-CM

## 2025-04-07 LAB
BILIRUBIN, POC: NEGATIVE
BLOOD URINE, POC: NORMAL
CLARITY, POC: NORMAL
COLOR, POC: NORMAL
CONTROL: NORMAL
GLUCOSE URINE, POC: NEGATIVE MG/DL
KETONES, POC: NEGATIVE MG/DL
LEUKOCYTE EST, POC: NEGATIVE
NITRITE, POC: NEGATIVE
PH, POC: 6
PREGNANCY TEST URINE, POC: NEGATIVE
PROTEIN, POC: NEGATIVE MG/DL
SPECIFIC GRAVITY, POC: 1.02
UROBILINOGEN, POC: NORMAL MG/DL

## 2025-04-07 PROCEDURE — 99213 OFFICE O/P EST LOW 20 MIN: CPT | Performed by: SURGERY

## 2025-04-07 PROCEDURE — 81003 URINALYSIS AUTO W/O SCOPE: CPT | Performed by: SURGERY

## 2025-04-07 PROCEDURE — 81025 URINE PREGNANCY TEST: CPT | Performed by: SURGERY

## 2025-04-07 SDOH — ECONOMIC STABILITY: FOOD INSECURITY: WITHIN THE PAST 12 MONTHS, YOU WORRIED THAT YOUR FOOD WOULD RUN OUT BEFORE YOU GOT MONEY TO BUY MORE.: NEVER TRUE

## 2025-04-07 SDOH — ECONOMIC STABILITY: FOOD INSECURITY: WITHIN THE PAST 12 MONTHS, THE FOOD YOU BOUGHT JUST DIDN'T LAST AND YOU DIDN'T HAVE MONEY TO GET MORE.: NEVER TRUE

## 2025-04-07 ASSESSMENT — PATIENT HEALTH QUESTIONNAIRE - PHQ9
7. TROUBLE CONCENTRATING ON THINGS, SUCH AS READING THE NEWSPAPER OR WATCHING TELEVISION: SEVERAL DAYS
2. FEELING DOWN, DEPRESSED OR HOPELESS: MORE THAN HALF THE DAYS
SUM OF ALL RESPONSES TO PHQ QUESTIONS 1-9: 6
5. POOR APPETITE OR OVEREATING: NOT AT ALL
SUM OF ALL RESPONSES TO PHQ QUESTIONS 1-9: 6
3. TROUBLE FALLING OR STAYING ASLEEP: SEVERAL DAYS
8. MOVING OR SPEAKING SO SLOWLY THAT OTHER PEOPLE COULD HAVE NOTICED. OR THE OPPOSITE, BEING SO FIGETY OR RESTLESS THAT YOU HAVE BEEN MOVING AROUND A LOT MORE THAN USUAL: NOT AT ALL
SUM OF ALL RESPONSES TO PHQ QUESTIONS 1-9: 6
9. THOUGHTS THAT YOU WOULD BE BETTER OFF DEAD, OR OF HURTING YOURSELF: NOT AT ALL
10. IF YOU CHECKED OFF ANY PROBLEMS, HOW DIFFICULT HAVE THESE PROBLEMS MADE IT FOR YOU TO DO YOUR WORK, TAKE CARE OF THINGS AT HOME, OR GET ALONG WITH OTHER PEOPLE: NOT DIFFICULT AT ALL
6. FEELING BAD ABOUT YOURSELF - OR THAT YOU ARE A FAILURE OR HAVE LET YOURSELF OR YOUR FAMILY DOWN: NOT AT ALL
SUM OF ALL RESPONSES TO PHQ QUESTIONS 1-9: 6
4. FEELING TIRED OR HAVING LITTLE ENERGY: SEVERAL DAYS
1. LITTLE INTEREST OR PLEASURE IN DOING THINGS: SEVERAL DAYS

## 2025-04-07 NOTE — PROGRESS NOTES
4/7/2025    History of Present Illness      This is a 40 y.o. female   Chief Complaint   Patient presents with    Back Pain     Flank pain going up left side of back, sciatic nerve pain, worsening     Urinary Retention     Does not have the urge to urinate     Vaginal Bleeding     Very heavy vaginal bleeding, passing quarter size clots, lower abdominal pain   .    Pain started in anterior left lower quadrant then moved to the back/flank she then noticed a little numbness on her lateral thigh.  She notes that it felt like it was pain near her ovary initially.  Her last menstrual period was in February but today began having heavy bleeding with some clots passage which is not normal for her.  She is not perimenopausal to her knowledge she did have hormone levels checked with her OB/GYN recently and they said she was not perimenopausal at this time.          Patient Active Problem List   Diagnosis    Urinary tract infection with hematuria    Allergic rhinitis    Congenital bronchial atresia    Extrinsic asthma    Postoperative abdominal pain    Other chest pain    SOB (shortness of breath)    Palpitations    Bradycardia    PALOMA (obstructive sleep apnea)    Personal history of COVID-19    Class 1 obesity in adult    Intractable migraine with aura without status migrainosus    Chronic tension-type headache, intractable    Spell of altered cognition    Dysthymia       Current Outpatient Medications   Medication Sig Dispense Refill    albuterol sulfate HFA (PROVENTIL;VENTOLIN;PROAIR) 108 (90 Base) MCG/ACT inhaler Inhale 2 puffs into the lungs every 6 hours as needed for Wheezing 18 g 3    albuterol sulfate HFA (VENTOLIN HFA) 108 (90 Base) MCG/ACT inhaler Inhale 2 puffs into the lungs every 6 hours as needed for Wheezing or Shortness of Breath 1 each 5     No current facility-administered medications for this visit.       Allergies   Allergen Reactions    Dye [Iodides] Other (See Comments)     Low HR    Morphine Diarrhea

## 2025-04-13 ENCOUNTER — OFFICE VISIT (OUTPATIENT)
Dept: URGENT CARE | Age: 40
End: 2025-04-13

## 2025-04-13 VITALS
SYSTOLIC BLOOD PRESSURE: 113 MMHG | DIASTOLIC BLOOD PRESSURE: 78 MMHG | TEMPERATURE: 97.8 F | OXYGEN SATURATION: 98 % | HEART RATE: 65 BPM | BODY MASS INDEX: 32.84 KG/M2 | WEIGHT: 185.4 LBS

## 2025-04-13 DIAGNOSIS — R09.81 NASAL CONGESTION: ICD-10-CM

## 2025-04-13 DIAGNOSIS — R09.82 POSTNASAL DRIP: ICD-10-CM

## 2025-04-13 DIAGNOSIS — J34.89 RHINORRHEA: ICD-10-CM

## 2025-04-13 DIAGNOSIS — R11.2 NAUSEA AND VOMITING, UNSPECIFIED VOMITING TYPE: ICD-10-CM

## 2025-04-13 DIAGNOSIS — R05.1 ACUTE COUGH: ICD-10-CM

## 2025-04-13 DIAGNOSIS — J06.9 VIRAL URI: Primary | ICD-10-CM

## 2025-04-13 PROBLEM — N92.1 MENORRHAGIA WITH IRREGULAR CYCLE: Status: RESOLVED | Noted: 2024-10-10 | Resolved: 2025-04-13

## 2025-04-13 PROBLEM — U07.1 COVID-19: Status: RESOLVED | Noted: 2021-07-25 | Resolved: 2025-04-13

## 2025-04-13 PROBLEM — R10.11 RIGHT UPPER QUADRANT ABDOMINAL PAIN: Status: RESOLVED | Noted: 2024-12-12 | Resolved: 2025-04-13

## 2025-04-13 RX ORDER — ONDANSETRON 4 MG/1
4 TABLET, ORALLY DISINTEGRATING ORAL 3 TIMES DAILY PRN
Qty: 21 TABLET | Refills: 0 | Status: SHIPPED | OUTPATIENT
Start: 2025-04-13

## 2025-04-15 ENCOUNTER — OFFICE VISIT (OUTPATIENT)
Dept: FAMILY MEDICINE CLINIC | Age: 40
End: 2025-04-15
Payer: COMMERCIAL

## 2025-04-15 VITALS
BODY MASS INDEX: 32.92 KG/M2 | HEIGHT: 63 IN | OXYGEN SATURATION: 98 % | HEART RATE: 78 BPM | DIASTOLIC BLOOD PRESSURE: 72 MMHG | RESPIRATION RATE: 15 BRPM | SYSTOLIC BLOOD PRESSURE: 114 MMHG | TEMPERATURE: 97.3 F | WEIGHT: 185.8 LBS

## 2025-04-15 DIAGNOSIS — Z00.00 ENCOUNTER FOR WELL ADULT EXAM WITHOUT ABNORMAL FINDINGS: ICD-10-CM

## 2025-04-15 DIAGNOSIS — Z00.00 ROUTINE MEDICAL EXAM: Primary | ICD-10-CM

## 2025-04-15 DIAGNOSIS — Z11.4 SCREENING FOR HIV WITHOUT PRESENCE OF RISK FACTORS: ICD-10-CM

## 2025-04-15 DIAGNOSIS — Z11.59 NEED FOR HEPATITIS C SCREENING TEST: ICD-10-CM

## 2025-04-15 DIAGNOSIS — Z00.00 ROUTINE MEDICAL EXAM: ICD-10-CM

## 2025-04-15 DIAGNOSIS — Z12.31 ENCOUNTER FOR SCREENING MAMMOGRAM FOR MALIGNANT NEOPLASM OF BREAST: ICD-10-CM

## 2025-04-15 PROCEDURE — 99396 PREV VISIT EST AGE 40-64: CPT | Performed by: SURGERY

## 2025-04-15 SDOH — ECONOMIC STABILITY: FOOD INSECURITY: WITHIN THE PAST 12 MONTHS, THE FOOD YOU BOUGHT JUST DIDN'T LAST AND YOU DIDN'T HAVE MONEY TO GET MORE.: NEVER TRUE

## 2025-04-15 SDOH — ECONOMIC STABILITY: FOOD INSECURITY: WITHIN THE PAST 12 MONTHS, YOU WORRIED THAT YOUR FOOD WOULD RUN OUT BEFORE YOU GOT MONEY TO BUY MORE.: NEVER TRUE

## 2025-04-15 ASSESSMENT — PATIENT HEALTH QUESTIONNAIRE - PHQ9
SUM OF ALL RESPONSES TO PHQ QUESTIONS 1-9: 0
6. FEELING BAD ABOUT YOURSELF - OR THAT YOU ARE A FAILURE OR HAVE LET YOURSELF OR YOUR FAMILY DOWN: NOT AT ALL
10. IF YOU CHECKED OFF ANY PROBLEMS, HOW DIFFICULT HAVE THESE PROBLEMS MADE IT FOR YOU TO DO YOUR WORK, TAKE CARE OF THINGS AT HOME, OR GET ALONG WITH OTHER PEOPLE: NOT DIFFICULT AT ALL
2. FEELING DOWN, DEPRESSED OR HOPELESS: NOT AT ALL
SUM OF ALL RESPONSES TO PHQ QUESTIONS 1-9: 0
SUM OF ALL RESPONSES TO PHQ QUESTIONS 1-9: 0
4. FEELING TIRED OR HAVING LITTLE ENERGY: NOT AT ALL
1. LITTLE INTEREST OR PLEASURE IN DOING THINGS: NOT AT ALL
8. MOVING OR SPEAKING SO SLOWLY THAT OTHER PEOPLE COULD HAVE NOTICED. OR THE OPPOSITE, BEING SO FIGETY OR RESTLESS THAT YOU HAVE BEEN MOVING AROUND A LOT MORE THAN USUAL: NOT AT ALL
7. TROUBLE CONCENTRATING ON THINGS, SUCH AS READING THE NEWSPAPER OR WATCHING TELEVISION: NOT AT ALL
3. TROUBLE FALLING OR STAYING ASLEEP: NOT AT ALL
SUM OF ALL RESPONSES TO PHQ QUESTIONS 1-9: 0
5. POOR APPETITE OR OVEREATING: NOT AT ALL
9. THOUGHTS THAT YOU WOULD BE BETTER OFF DEAD, OR OF HURTING YOURSELF: NOT AT ALL

## 2025-04-15 ASSESSMENT — ANXIETY QUESTIONNAIRES
1. FEELING NERVOUS, ANXIOUS, OR ON EDGE: NOT AT ALL
4. TROUBLE RELAXING: NOT AT ALL
GAD7 TOTAL SCORE: 0
5. BEING SO RESTLESS THAT IT IS HARD TO SIT STILL: NOT AT ALL
2. NOT BEING ABLE TO STOP OR CONTROL WORRYING: NOT AT ALL
3. WORRYING TOO MUCH ABOUT DIFFERENT THINGS: NOT AT ALL
IF YOU CHECKED OFF ANY PROBLEMS ON THIS QUESTIONNAIRE, HOW DIFFICULT HAVE THESE PROBLEMS MADE IT FOR YOU TO DO YOUR WORK, TAKE CARE OF THINGS AT HOME, OR GET ALONG WITH OTHER PEOPLE: NOT DIFFICULT AT ALL
6. BECOMING EASILY ANNOYED OR IRRITABLE: NOT AT ALL
7. FEELING AFRAID AS IF SOMETHING AWFUL MIGHT HAPPEN: NOT AT ALL

## 2025-04-15 ASSESSMENT — ENCOUNTER SYMPTOMS
SHORTNESS OF BREATH: 0
CONSTIPATION: 0
ABDOMINAL PAIN: 1
NAUSEA: 0
COUGH: 0
DIARRHEA: 0
SORE THROAT: 0

## 2025-04-16 LAB
ALBUMIN SERPL-MCNC: 4.6 G/DL (ref 3.4–5)
ALBUMIN/GLOB SERPL: 1.8 {RATIO} (ref 1.1–2.2)
ALP SERPL-CCNC: 93 U/L (ref 40–129)
ALT SERPL-CCNC: 16 U/L (ref 10–40)
ANION GAP SERPL CALCULATED.3IONS-SCNC: 7 MMOL/L (ref 3–16)
AST SERPL-CCNC: 18 U/L (ref 15–37)
BASOPHILS # BLD: 0 K/UL (ref 0–0.2)
BASOPHILS NFR BLD: 0.5 %
BILIRUB SERPL-MCNC: 0.6 MG/DL (ref 0–1)
BUN SERPL-MCNC: 10 MG/DL (ref 7–20)
CALCIUM SERPL-MCNC: 10 MG/DL (ref 8.3–10.6)
CHLORIDE SERPL-SCNC: 103 MMOL/L (ref 99–110)
CHOLEST SERPL-MCNC: 168 MG/DL (ref 0–199)
CO2 SERPL-SCNC: 25 MMOL/L (ref 21–32)
CREAT SERPL-MCNC: 0.8 MG/DL (ref 0.6–1.1)
DEPRECATED RDW RBC AUTO: 13.1 % (ref 12.4–15.4)
EOSINOPHIL # BLD: 0.3 K/UL (ref 0–0.6)
EOSINOPHIL NFR BLD: 3.3 %
GFR SERPLBLD CREATININE-BSD FMLA CKD-EPI: >90 ML/MIN/{1.73_M2}
GLUCOSE SERPL-MCNC: 92 MG/DL (ref 70–99)
HCT VFR BLD AUTO: 44 % (ref 36–48)
HCV AB SERPL QL IA: NORMAL
HDLC SERPL-MCNC: 36 MG/DL (ref 40–60)
HGB BLD-MCNC: 15.4 G/DL (ref 12–16)
HIV 1+2 AB+HIV1 P24 AG SERPL QL IA: NORMAL
HIV 2 AB SERPL QL IA: NORMAL
HIV1 AB SERPL QL IA: NORMAL
HIV1 P24 AG SERPL QL IA: NORMAL
LDL CHOLESTEROL: 113 MG/DL
LYMPHOCYTES # BLD: 1.7 K/UL (ref 1–5.1)
LYMPHOCYTES NFR BLD: 22.1 %
MCH RBC QN AUTO: 30.7 PG (ref 26–34)
MCHC RBC AUTO-ENTMCNC: 34.9 G/DL (ref 31–36)
MCV RBC AUTO: 88.1 FL (ref 80–100)
MONOCYTES # BLD: 0.3 K/UL (ref 0–1.3)
MONOCYTES NFR BLD: 4.6 %
NEUTROPHILS # BLD: 5.3 K/UL (ref 1.7–7.7)
NEUTROPHILS NFR BLD: 69.5 %
PLATELET # BLD AUTO: 361 K/UL (ref 135–450)
PMV BLD AUTO: 7.8 FL (ref 5–10.5)
POTASSIUM SERPL-SCNC: 4.3 MMOL/L (ref 3.5–5.1)
PROT SERPL-MCNC: 7.1 G/DL (ref 6.4–8.2)
RBC # BLD AUTO: 4.99 M/UL (ref 4–5.2)
SODIUM SERPL-SCNC: 135 MMOL/L (ref 136–145)
TRIGL SERPL-MCNC: 95 MG/DL (ref 0–150)
TSH SERPL DL<=0.005 MIU/L-ACNC: 1.07 UIU/ML (ref 0.27–4.2)
VLDLC SERPL CALC-MCNC: 19 MG/DL
WBC # BLD AUTO: 7.7 K/UL (ref 4–11)

## 2025-04-17 ENCOUNTER — RESULTS FOLLOW-UP (OUTPATIENT)
Dept: FAMILY MEDICINE CLINIC | Age: 40
End: 2025-04-17

## 2025-04-20 ENCOUNTER — APPOINTMENT (OUTPATIENT)
Dept: GENERAL RADIOLOGY | Age: 40
End: 2025-04-20
Payer: COMMERCIAL

## 2025-04-20 ENCOUNTER — HOSPITAL ENCOUNTER (EMERGENCY)
Age: 40
Discharge: HOME | End: 2025-04-20
Attending: EMERGENCY MEDICINE
Payer: COMMERCIAL

## 2025-04-20 ENCOUNTER — APPOINTMENT (OUTPATIENT)
Dept: CT IMAGING | Age: 40
End: 2025-04-20
Payer: COMMERCIAL

## 2025-04-20 VITALS
TEMPERATURE: 98.3 F | HEIGHT: 63 IN | RESPIRATION RATE: 16 BRPM | BODY MASS INDEX: 32.6 KG/M2 | HEART RATE: 58 BPM | SYSTOLIC BLOOD PRESSURE: 111 MMHG | OXYGEN SATURATION: 97 % | WEIGHT: 184 LBS | DIASTOLIC BLOOD PRESSURE: 74 MMHG

## 2025-04-20 DIAGNOSIS — R10.9 ABDOMINAL PAIN, UNSPECIFIED ABDOMINAL LOCATION: Primary | ICD-10-CM

## 2025-04-20 LAB
ABO/RH: NORMAL
ALBUMIN SERPL-MCNC: 4.5 G/DL (ref 3.4–5)
ALP SERPL-CCNC: 94 U/L (ref 40–129)
ALT SERPL-CCNC: 15 U/L (ref 10–40)
ANION GAP SERPL CALCULATED.3IONS-SCNC: 11 MMOL/L (ref 3–16)
ANTIBODY SCREEN: NORMAL
AST SERPL-CCNC: 18 U/L (ref 15–37)
BACTERIA URNS QL MICRO: ABNORMAL /HPF
BASOPHILS # BLD: 0 K/UL (ref 0–0.2)
BASOPHILS NFR BLD: 0.3 %
BILIRUB DIRECT SERPL-MCNC: 0.3 MG/DL (ref 0–0.3)
BILIRUB INDIRECT SERPL-MCNC: 0.5 MG/DL (ref 0–1)
BILIRUB SERPL-MCNC: 0.8 MG/DL (ref 0–1)
BILIRUB UR QL STRIP.AUTO: NEGATIVE
BUN SERPL-MCNC: 11 MG/DL (ref 7–20)
CALCIUM SERPL-MCNC: 10.3 MG/DL (ref 8.3–10.6)
CHLORIDE SERPL-SCNC: 104 MMOL/L (ref 99–110)
CLARITY UR: CLEAR
CO2 SERPL-SCNC: 25 MMOL/L (ref 21–32)
COLOR UR: YELLOW
CREAT SERPL-MCNC: 0.9 MG/DL (ref 0.6–1.1)
DEPRECATED RDW RBC AUTO: 12.9 % (ref 12.4–15.4)
EKG ATRIAL RATE: 65 BPM
EKG DIAGNOSIS: NORMAL
EKG P AXIS: 49 DEGREES
EKG P-R INTERVAL: 174 MS
EKG Q-T INTERVAL: 398 MS
EKG QRS DURATION: 90 MS
EKG QTC CALCULATION (BAZETT): 413 MS
EKG R AXIS: -4 DEGREES
EKG T AXIS: 3 DEGREES
EKG VENTRICULAR RATE: 65 BPM
EOSINOPHIL # BLD: 0.4 K/UL (ref 0–0.6)
EOSINOPHIL NFR BLD: 4.8 %
EPI CELLS #/AREA URNS HPF: ABNORMAL /HPF (ref 0–5)
GFR SERPLBLD CREATININE-BSD FMLA CKD-EPI: 83 ML/MIN/{1.73_M2}
GLUCOSE SERPL-MCNC: 98 MG/DL (ref 70–99)
GLUCOSE UR STRIP.AUTO-MCNC: NEGATIVE MG/DL
HCG UR QL: NEGATIVE
HCT VFR BLD AUTO: 45.7 % (ref 36–48)
HGB BLD-MCNC: 15.8 G/DL (ref 12–16)
HGB UR QL STRIP.AUTO: NEGATIVE
INR PPP: 1.09 (ref 0.85–1.15)
KETONES UR STRIP.AUTO-MCNC: NEGATIVE MG/DL
LACTATE BLDV-SCNC: 0.5 MMOL/L (ref 0.4–2)
LEUKOCYTE ESTERASE UR QL STRIP.AUTO: NEGATIVE
LIPASE SERPL-CCNC: 26 U/L (ref 13–60)
LYMPHOCYTES # BLD: 1.9 K/UL (ref 1–5.1)
LYMPHOCYTES NFR BLD: 20.7 %
MCH RBC QN AUTO: 30.3 PG (ref 26–34)
MCHC RBC AUTO-ENTMCNC: 34.5 G/DL (ref 31–36)
MCV RBC AUTO: 88 FL (ref 80–100)
MONOCYTES # BLD: 0.5 K/UL (ref 0–1.3)
MONOCYTES NFR BLD: 5.2 %
MUCOUS THREADS #/AREA URNS LPF: ABNORMAL /LPF
NEUTROPHILS # BLD: 6.3 K/UL (ref 1.7–7.7)
NEUTROPHILS NFR BLD: 69 %
NITRITE UR QL STRIP.AUTO: NEGATIVE
PH UR STRIP.AUTO: 6 [PH] (ref 5–8)
PLATELET # BLD AUTO: 342 K/UL (ref 135–450)
PMV BLD AUTO: 7.3 FL (ref 5–10.5)
POTASSIUM SERPL-SCNC: 3.7 MMOL/L (ref 3.5–5.1)
PROT SERPL-MCNC: 7.2 G/DL (ref 6.4–8.2)
PROT UR STRIP.AUTO-MCNC: ABNORMAL MG/DL
PROTHROMBIN TIME: 14.3 SEC (ref 11.9–14.9)
RBC # BLD AUTO: 5.19 M/UL (ref 4–5.2)
RBC #/AREA URNS HPF: ABNORMAL /HPF (ref 0–4)
SODIUM SERPL-SCNC: 140 MMOL/L (ref 136–145)
SP GR UR STRIP.AUTO: >=1.03 (ref 1–1.03)
UA COMPLETE W REFLEX CULTURE PNL UR: ABNORMAL
UA DIPSTICK W REFLEX MICRO PNL UR: YES
URN SPEC COLLECT METH UR: ABNORMAL
UROBILINOGEN UR STRIP-ACNC: 0.2 E.U./DL
WBC # BLD AUTO: 9.1 K/UL (ref 4–11)
WBC #/AREA URNS HPF: ABNORMAL /HPF (ref 0–5)

## 2025-04-20 PROCEDURE — 83690 ASSAY OF LIPASE: CPT

## 2025-04-20 PROCEDURE — 2580000003 HC RX 258: Performed by: EMERGENCY MEDICINE

## 2025-04-20 PROCEDURE — 96374 THER/PROPH/DIAG INJ IV PUSH: CPT

## 2025-04-20 PROCEDURE — 36415 COLL VENOUS BLD VENIPUNCTURE: CPT

## 2025-04-20 PROCEDURE — 83605 ASSAY OF LACTIC ACID: CPT

## 2025-04-20 PROCEDURE — 96375 TX/PRO/DX INJ NEW DRUG ADDON: CPT

## 2025-04-20 PROCEDURE — 85025 COMPLETE CBC W/AUTO DIFF WBC: CPT

## 2025-04-20 PROCEDURE — 2580000003 HC RX 258

## 2025-04-20 PROCEDURE — 99285 EMERGENCY DEPT VISIT HI MDM: CPT

## 2025-04-20 PROCEDURE — 80076 HEPATIC FUNCTION PANEL: CPT

## 2025-04-20 PROCEDURE — 6370000000 HC RX 637 (ALT 250 FOR IP)

## 2025-04-20 PROCEDURE — 93005 ELECTROCARDIOGRAM TRACING: CPT | Performed by: EMERGENCY MEDICINE

## 2025-04-20 PROCEDURE — 85610 PROTHROMBIN TIME: CPT

## 2025-04-20 PROCEDURE — 74177 CT ABD & PELVIS W/CONTRAST: CPT

## 2025-04-20 PROCEDURE — 86901 BLOOD TYPING SEROLOGIC RH(D): CPT

## 2025-04-20 PROCEDURE — 84703 CHORIONIC GONADOTROPIN ASSAY: CPT

## 2025-04-20 PROCEDURE — 6360000004 HC RX CONTRAST MEDICATION: Performed by: EMERGENCY MEDICINE

## 2025-04-20 PROCEDURE — 6370000000 HC RX 637 (ALT 250 FOR IP): Performed by: EMERGENCY MEDICINE

## 2025-04-20 PROCEDURE — 81001 URINALYSIS AUTO W/SCOPE: CPT

## 2025-04-20 PROCEDURE — 71046 X-RAY EXAM CHEST 2 VIEWS: CPT

## 2025-04-20 PROCEDURE — 80048 BASIC METABOLIC PNL TOTAL CA: CPT

## 2025-04-20 PROCEDURE — 2500000003 HC RX 250 WO HCPCS: Performed by: EMERGENCY MEDICINE

## 2025-04-20 PROCEDURE — 6360000002 HC RX W HCPCS: Performed by: EMERGENCY MEDICINE

## 2025-04-20 PROCEDURE — 86850 RBC ANTIBODY SCREEN: CPT

## 2025-04-20 PROCEDURE — 86900 BLOOD TYPING SEROLOGIC ABO: CPT

## 2025-04-20 RX ORDER — SODIUM CHLORIDE, SODIUM LACTATE, POTASSIUM CHLORIDE, AND CALCIUM CHLORIDE .6; .31; .03; .02 G/100ML; G/100ML; G/100ML; G/100ML
1000 INJECTION, SOLUTION INTRAVENOUS ONCE
Status: COMPLETED | OUTPATIENT
Start: 2025-04-20 | End: 2025-04-20

## 2025-04-20 RX ORDER — SODIUM CHLORIDE, SODIUM LACTATE, POTASSIUM CHLORIDE, CALCIUM CHLORIDE 600; 310; 30; 20 MG/100ML; MG/100ML; MG/100ML; MG/100ML
INJECTION, SOLUTION INTRAVENOUS CONTINUOUS
Status: DISCONTINUED | OUTPATIENT
Start: 2025-04-20 | End: 2025-04-20 | Stop reason: HOSPADM

## 2025-04-20 RX ORDER — PROMETHAZINE HYDROCHLORIDE 25 MG/1
25 TABLET ORAL ONCE
Status: COMPLETED | OUTPATIENT
Start: 2025-04-20 | End: 2025-04-20

## 2025-04-20 RX ORDER — PROMETHAZINE HYDROCHLORIDE 25 MG/1
25 TABLET ORAL EVERY 6 HOURS PRN
Qty: 20 TABLET | Refills: 0 | Status: SHIPPED | OUTPATIENT
Start: 2025-04-20 | End: 2025-04-25 | Stop reason: ALTCHOICE

## 2025-04-20 RX ORDER — MAGNESIUM HYDROXIDE/ALUMINUM HYDROXICE/SIMETHICONE 120; 1200; 1200 MG/30ML; MG/30ML; MG/30ML
30 SUSPENSION ORAL ONCE
Status: COMPLETED | OUTPATIENT
Start: 2025-04-20 | End: 2025-04-20

## 2025-04-20 RX ORDER — ONDANSETRON 2 MG/ML
8 INJECTION INTRAMUSCULAR; INTRAVENOUS ONCE
Status: COMPLETED | OUTPATIENT
Start: 2025-04-20 | End: 2025-04-20

## 2025-04-20 RX ORDER — IOPAMIDOL 755 MG/ML
75 INJECTION, SOLUTION INTRAVASCULAR
Status: COMPLETED | OUTPATIENT
Start: 2025-04-20 | End: 2025-04-20

## 2025-04-20 RX ORDER — DROPERIDOL 2.5 MG/ML
1.25 INJECTION, SOLUTION INTRAMUSCULAR; INTRAVENOUS ONCE
Status: COMPLETED | OUTPATIENT
Start: 2025-04-20 | End: 2025-04-20

## 2025-04-20 RX ORDER — HYDROMORPHONE HYDROCHLORIDE 1 MG/ML
1 INJECTION, SOLUTION INTRAMUSCULAR; INTRAVENOUS; SUBCUTANEOUS
Refills: 0 | Status: COMPLETED | OUTPATIENT
Start: 2025-04-20 | End: 2025-04-20

## 2025-04-20 RX ADMIN — SODIUM CHLORIDE, SODIUM LACTATE, POTASSIUM CHLORIDE, AND CALCIUM CHLORIDE 1000 ML: .6; .31; .03; .02 INJECTION, SOLUTION INTRAVENOUS at 00:58

## 2025-04-20 RX ADMIN — IOPAMIDOL 75 ML: 755 INJECTION, SOLUTION INTRAVENOUS at 03:01

## 2025-04-20 RX ADMIN — DROPERIDOL 1.25 MG: 2.5 INJECTION, SOLUTION INTRAMUSCULAR; INTRAVENOUS at 04:10

## 2025-04-20 RX ADMIN — ALUMINUM HYDROXIDE, MAGNESIUM HYDROXIDE, AND SIMETHICONE 30 ML: 200; 200; 20 SUSPENSION ORAL at 05:57

## 2025-04-20 RX ADMIN — SODIUM CHLORIDE, SODIUM LACTATE, POTASSIUM CHLORIDE, AND CALCIUM CHLORIDE 1000 ML: .6; .31; .03; .02 INJECTION, SOLUTION INTRAVENOUS at 05:56

## 2025-04-20 RX ADMIN — ONDANSETRON 8 MG: 2 INJECTION, SOLUTION INTRAMUSCULAR; INTRAVENOUS at 00:59

## 2025-04-20 RX ADMIN — PROMETHAZINE HYDROCHLORIDE 25 MG: 25 TABLET ORAL at 06:18

## 2025-04-20 RX ADMIN — HYDROMORPHONE HYDROCHLORIDE 1 MG: 1 INJECTION, SOLUTION INTRAMUSCULAR; INTRAVENOUS; SUBCUTANEOUS at 00:59

## 2025-04-20 ASSESSMENT — PAIN DESCRIPTION - DESCRIPTORS
DESCRIPTORS: SHARP;ACHING
DESCRIPTORS: STABBING

## 2025-04-20 ASSESSMENT — PAIN SCALES - GENERAL
PAINLEVEL_OUTOF10: 8
PAINLEVEL_OUTOF10: 9
PAINLEVEL_OUTOF10: 9

## 2025-04-20 ASSESSMENT — PAIN - FUNCTIONAL ASSESSMENT: PAIN_FUNCTIONAL_ASSESSMENT: 0-10

## 2025-04-20 ASSESSMENT — PAIN DESCRIPTION - LOCATION
LOCATION: CHEST
LOCATION: ABDOMEN

## 2025-04-20 ASSESSMENT — PAIN DESCRIPTION - ORIENTATION
ORIENTATION: MID;UPPER
ORIENTATION: LEFT;MID

## 2025-04-20 ASSESSMENT — LIFESTYLE VARIABLES
HOW OFTEN DO YOU HAVE A DRINK CONTAINING ALCOHOL: NEVER
HOW MANY STANDARD DRINKS CONTAINING ALCOHOL DO YOU HAVE ON A TYPICAL DAY: PATIENT DOES NOT DRINK

## 2025-04-20 NOTE — ED PROVIDER NOTES
[facility]  EMERGENCY DEPARTMENT ENCOUNTER          ATTENDING PHYSICIAN NOTE       Date of evaluation: 4/20/2025    Chief Complaint     Vomiting (Patient presents to ED with report of abdominal pain, nausea, vomiting and diarrhea for about two weeks. Reports symptoms not improving.), Diarrhea, and Abdominal Pain      History of Present Illness     Christine Hargrove is a 40 y.o. female who presents to the emerged ferment with upper abdominal pain, nausea and vomiting.  She states that symptoms been ongoing for approximately 2 weeks.  This been associated with loose stools and diarrhea.  She denies any fevers.  No chest pain or shortness of breath.  She denies any falls or trauma.    On chart review the patient has had recurrent episodes of abdominal pain now for multiple months.  She is scheduled for outpatient CT of her abdomen by her primary care physician.    ASSESSMENT / PLAN  (MEDICAL DECISION MAKING)     INITIAL VITALS: BP: 137/87, Temp: 98.3 °F (36.8 °C), Pulse: 82, Respirations: 18, SpO2: 99 %      Christine Hargrove is a 40 y.o. female who presents to the emergency department with recurrent episodes of diffuse abdominal pain.  On initial evaluation she is overall well-appearing.  No tachycardia or hypotension lowering concern for shock state.  She is afebrile.  She has diffuse tenderness to palpation in the bilateral upper abdomen with no rebound or guarding.  Will proceed with laboratory workup and symptomatic management.    I did consider thoracic etiologies of her symptoms.  EKG showed no acute concerns.  Chest x-ray is reassuring as well.    Laboratory workup shows no leukocytosis or anemia.  Urinalysis without signs of infection.  Remaining laboratory workup is overall reassuring.    The patient did have initial resolution of her pain following Dilaudid however pain did recur.  After shared decision making regarding cross-sectional imaging we will proceed with cross-sectional imaging as this

## 2025-04-20 NOTE — ED NOTES
Pt discharged to home, alert and oriented. Denies any questions about discharge instructions. Will follow up as directed. encouraged to return for any worsening symptoms.        Crystal Brito RN  04/20/25 0729

## 2025-04-20 NOTE — CONSULTS
General Surgery   Resident Consult Note    Reason for Consult: pSBO    History of Present Illness:   Christine Hargrove is a 40 y.o. female with Hx of tubal ligation (2008) and lap luisana (2013). Presents for n/v since the 9th. Reports diarrhea for the past 48 hours as well. Last threw up at midnight. Last bowel movement today. No hx of UC or Crohns in the family. Reports she has had similar sx like this when she's had a stomach bug. Reports it feels like it but worse. Denies any sick contacts. Reports some mild abdominal discomfort. Reports she feels hot, but no recorded fever or chills. Of note, she recently had a URI in the past week. No other medical complaints or injuries verbalized at this time. Last EGD and C-Scope >10 years ago. Fairly unremarkable.     Past Medical History:        Diagnosis Date    ADHD (attention deficit hyperactivity disorder)     Allergic rhinitis 07/25/2019    Asthma     Class 1 obesity in adult 01/28/2022    COVID-19 08/03/2021    COVID-19 07/25/2021    Dysthymia 03/14/2023    Intractable migraine with aura without status migrainosus 03/14/2023    Kidney calculi     PALOMA (obstructive sleep apnea) 01/28/2022    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     Seizures (HCC)        Past Surgical History:           Procedure Laterality Date    CHOLECYSTECTOMY      TUBAL LIGATION         Allergies:  Coconut (cocos nucifera), Dye [iodides], Morphine, Pineapple, Sulfa antibiotics, Vicodin [hydrocodone-acetaminophen], Codeine, and Penicillins    Medications:   Home Meds  No current facility-administered medications on file prior to encounter.     Current Outpatient Medications on File Prior to Encounter   Medication Sig Dispense Refill    ondansetron (ZOFRAN-ODT) 4 MG disintegrating tablet Take 1 tablet by mouth 3 times daily as needed for Nausea or Vomiting (Patient not taking: Reported on 4/20/2025) 21 tablet 0    albuterol sulfate HFA (VENTOLIN HFA) 108 (90 Base) MCG/ACT inhaler Inhale 2 puffs

## 2025-04-21 ENCOUNTER — HOSPITAL ENCOUNTER (INPATIENT)
Age: 40
LOS: 2 days | Discharge: HOME | DRG: 392 | End: 2025-04-23
Attending: EMERGENCY MEDICINE | Admitting: STUDENT IN AN ORGANIZED HEALTH CARE EDUCATION/TRAINING PROGRAM
Payer: COMMERCIAL

## 2025-04-21 ENCOUNTER — APPOINTMENT (OUTPATIENT)
Dept: CT IMAGING | Age: 40
DRG: 392 | End: 2025-04-21
Payer: COMMERCIAL

## 2025-04-21 DIAGNOSIS — R10.9 ABDOMINAL PAIN, UNSPECIFIED ABDOMINAL LOCATION: ICD-10-CM

## 2025-04-21 DIAGNOSIS — R10.33 PERIUMBILICAL ABDOMINAL PAIN: Primary | ICD-10-CM

## 2025-04-21 DIAGNOSIS — R11.2 NAUSEA AND VOMITING, UNSPECIFIED VOMITING TYPE: ICD-10-CM

## 2025-04-21 LAB
ALBUMIN SERPL-MCNC: 3.7 G/DL (ref 3.4–5)
ALP SERPL-CCNC: 90 U/L (ref 40–129)
ALT SERPL-CCNC: 18 U/L (ref 10–40)
ANION GAP SERPL CALCULATED.3IONS-SCNC: 7 MMOL/L (ref 3–16)
AST SERPL-CCNC: 22 U/L (ref 15–37)
BACTERIA URNS QL MICRO: ABNORMAL /HPF
BASOPHILS # BLD: 0 K/UL (ref 0–0.2)
BASOPHILS NFR BLD: 0.3 %
BILIRUB DIRECT SERPL-MCNC: 0.2 MG/DL (ref 0–0.3)
BILIRUB INDIRECT SERPL-MCNC: 0.3 MG/DL (ref 0–1)
BILIRUB SERPL-MCNC: 0.5 MG/DL (ref 0–1)
BILIRUB UR QL STRIP.AUTO: NEGATIVE
BUN SERPL-MCNC: 8 MG/DL (ref 7–20)
C DIFF TOX A+B STL QL IA: NORMAL
CALCIUM SERPL-MCNC: 9.5 MG/DL (ref 8.3–10.6)
CHLORIDE SERPL-SCNC: 110 MMOL/L (ref 99–110)
CLARITY UR: CLEAR
CO2 SERPL-SCNC: 23 MMOL/L (ref 21–32)
COLOR UR: YELLOW
CREAT SERPL-MCNC: 0.8 MG/DL (ref 0.6–1.1)
DEPRECATED RDW RBC AUTO: 13 % (ref 12.4–15.4)
EOSINOPHIL # BLD: 0.7 K/UL (ref 0–0.6)
EOSINOPHIL NFR BLD: 8.3 %
EPI CELLS #/AREA URNS HPF: ABNORMAL /HPF (ref 0–5)
GFR SERPLBLD CREATININE-BSD FMLA CKD-EPI: >90 ML/MIN/{1.73_M2}
GLUCOSE SERPL-MCNC: 83 MG/DL (ref 70–99)
GLUCOSE UR STRIP.AUTO-MCNC: NEGATIVE MG/DL
HCG UR QL: NEGATIVE
HCT VFR BLD AUTO: 40.5 % (ref 36–48)
HGB BLD-MCNC: 14.3 G/DL (ref 12–16)
HGB UR QL STRIP.AUTO: NEGATIVE
KETONES UR STRIP.AUTO-MCNC: NEGATIVE MG/DL
LACTATE BLDV-SCNC: 0.9 MMOL/L (ref 0.4–2)
LEUKOCYTE ESTERASE UR QL STRIP.AUTO: NEGATIVE
LIPASE SERPL-CCNC: 20 U/L (ref 13–60)
LYMPHOCYTES # BLD: 1.7 K/UL (ref 1–5.1)
LYMPHOCYTES NFR BLD: 19.4 %
MCH RBC QN AUTO: 30.4 PG (ref 26–34)
MCHC RBC AUTO-ENTMCNC: 35.2 G/DL (ref 31–36)
MCV RBC AUTO: 86.4 FL (ref 80–100)
MONOCYTES # BLD: 0.4 K/UL (ref 0–1.3)
MONOCYTES NFR BLD: 4.5 %
NEUTROPHILS # BLD: 5.8 K/UL (ref 1.7–7.7)
NEUTROPHILS NFR BLD: 67.5 %
NITRITE UR QL STRIP.AUTO: NEGATIVE
PH UR STRIP.AUTO: 8 [PH] (ref 5–8)
PLATELET # BLD AUTO: 307 K/UL (ref 135–450)
PMV BLD AUTO: 6.8 FL (ref 5–10.5)
POTASSIUM SERPL-SCNC: 4.3 MMOL/L (ref 3.5–5.1)
PROT SERPL-MCNC: 6.9 G/DL (ref 6.4–8.2)
PROT UR STRIP.AUTO-MCNC: ABNORMAL MG/DL
RBC # BLD AUTO: 4.68 M/UL (ref 4–5.2)
RBC #/AREA URNS HPF: ABNORMAL /HPF (ref 0–4)
SODIUM SERPL-SCNC: 140 MMOL/L (ref 136–145)
SP GR UR STRIP.AUTO: 1.02 (ref 1–1.03)
UA COMPLETE W REFLEX CULTURE PNL UR: ABNORMAL
UA DIPSTICK W REFLEX MICRO PNL UR: YES
URN SPEC COLLECT METH UR: ABNORMAL
UROBILINOGEN UR STRIP-ACNC: 0.2 E.U./DL
WBC # BLD AUTO: 8.7 K/UL (ref 4–11)
WBC #/AREA URNS HPF: ABNORMAL /HPF (ref 0–5)

## 2025-04-21 PROCEDURE — 36415 COLL VENOUS BLD VENIPUNCTURE: CPT

## 2025-04-21 PROCEDURE — 74177 CT ABD & PELVIS W/CONTRAST: CPT

## 2025-04-21 PROCEDURE — 96361 HYDRATE IV INFUSION ADD-ON: CPT

## 2025-04-21 PROCEDURE — 87506 IADNA-DNA/RNA PROBE TQ 6-11: CPT

## 2025-04-21 PROCEDURE — 6360000004 HC RX CONTRAST MEDICATION

## 2025-04-21 PROCEDURE — 80076 HEPATIC FUNCTION PANEL: CPT

## 2025-04-21 PROCEDURE — 83605 ASSAY OF LACTIC ACID: CPT

## 2025-04-21 PROCEDURE — 96374 THER/PROPH/DIAG INJ IV PUSH: CPT

## 2025-04-21 PROCEDURE — 84703 CHORIONIC GONADOTROPIN ASSAY: CPT

## 2025-04-21 PROCEDURE — 2580000003 HC RX 258: Performed by: PHYSICIAN ASSISTANT

## 2025-04-21 PROCEDURE — 85025 COMPLETE CBC W/AUTO DIFF WBC: CPT

## 2025-04-21 PROCEDURE — 87449 NOS EACH ORGANISM AG IA: CPT

## 2025-04-21 PROCEDURE — 83690 ASSAY OF LIPASE: CPT

## 2025-04-21 PROCEDURE — 80048 BASIC METABOLIC PNL TOTAL CA: CPT

## 2025-04-21 PROCEDURE — 6360000002 HC RX W HCPCS: Performed by: PHYSICIAN ASSISTANT

## 2025-04-21 PROCEDURE — 1200000000 HC SEMI PRIVATE

## 2025-04-21 PROCEDURE — 81001 URINALYSIS AUTO W/SCOPE: CPT

## 2025-04-21 PROCEDURE — 87324 CLOSTRIDIUM AG IA: CPT

## 2025-04-21 PROCEDURE — 99285 EMERGENCY DEPT VISIT HI MDM: CPT

## 2025-04-21 PROCEDURE — 2580000003 HC RX 258: Performed by: STUDENT IN AN ORGANIZED HEALTH CARE EDUCATION/TRAINING PROGRAM

## 2025-04-21 RX ORDER — SODIUM CHLORIDE 9 MG/ML
INJECTION, SOLUTION INTRAVENOUS PRN
Status: DISCONTINUED | OUTPATIENT
Start: 2025-04-21 | End: 2025-04-23 | Stop reason: HOSPADM

## 2025-04-21 RX ORDER — ONDANSETRON 4 MG/1
4 TABLET, ORALLY DISINTEGRATING ORAL EVERY 8 HOURS PRN
Status: DISCONTINUED | OUTPATIENT
Start: 2025-04-21 | End: 2025-04-23 | Stop reason: HOSPADM

## 2025-04-21 RX ORDER — ACETAMINOPHEN 325 MG/1
650 TABLET ORAL EVERY 6 HOURS PRN
Status: DISCONTINUED | OUTPATIENT
Start: 2025-04-21 | End: 2025-04-23 | Stop reason: HOSPADM

## 2025-04-21 RX ORDER — POTASSIUM CHLORIDE 1500 MG/1
40 TABLET, EXTENDED RELEASE ORAL PRN
Status: DISCONTINUED | OUTPATIENT
Start: 2025-04-21 | End: 2025-04-23 | Stop reason: HOSPADM

## 2025-04-21 RX ORDER — ENOXAPARIN SODIUM 100 MG/ML
40 INJECTION SUBCUTANEOUS DAILY
Status: DISCONTINUED | OUTPATIENT
Start: 2025-04-22 | End: 2025-04-23 | Stop reason: HOSPADM

## 2025-04-21 RX ORDER — DROPERIDOL 2.5 MG/ML
1.25 INJECTION, SOLUTION INTRAMUSCULAR; INTRAVENOUS ONCE
Status: COMPLETED | OUTPATIENT
Start: 2025-04-21 | End: 2025-04-21

## 2025-04-21 RX ORDER — SODIUM CHLORIDE, SODIUM LACTATE, POTASSIUM CHLORIDE, CALCIUM CHLORIDE 600; 310; 30; 20 MG/100ML; MG/100ML; MG/100ML; MG/100ML
INJECTION, SOLUTION INTRAVENOUS CONTINUOUS
Status: DISCONTINUED | OUTPATIENT
Start: 2025-04-21 | End: 2025-04-23 | Stop reason: HOSPADM

## 2025-04-21 RX ORDER — SODIUM CHLORIDE 0.9 % (FLUSH) 0.9 %
5-40 SYRINGE (ML) INJECTION EVERY 12 HOURS SCHEDULED
Status: DISCONTINUED | OUTPATIENT
Start: 2025-04-21 | End: 2025-04-23 | Stop reason: HOSPADM

## 2025-04-21 RX ORDER — POTASSIUM CHLORIDE 7.45 MG/ML
10 INJECTION INTRAVENOUS PRN
Status: DISCONTINUED | OUTPATIENT
Start: 2025-04-21 | End: 2025-04-23 | Stop reason: HOSPADM

## 2025-04-21 RX ORDER — MAGNESIUM SULFATE IN WATER 40 MG/ML
2000 INJECTION, SOLUTION INTRAVENOUS PRN
Status: DISCONTINUED | OUTPATIENT
Start: 2025-04-21 | End: 2025-04-23 | Stop reason: HOSPADM

## 2025-04-21 RX ORDER — SODIUM CHLORIDE, SODIUM LACTATE, POTASSIUM CHLORIDE, AND CALCIUM CHLORIDE .6; .31; .03; .02 G/100ML; G/100ML; G/100ML; G/100ML
1000 INJECTION, SOLUTION INTRAVENOUS ONCE
Status: COMPLETED | OUTPATIENT
Start: 2025-04-21 | End: 2025-04-21

## 2025-04-21 RX ORDER — ONDANSETRON 2 MG/ML
4 INJECTION INTRAMUSCULAR; INTRAVENOUS EVERY 6 HOURS PRN
Status: DISCONTINUED | OUTPATIENT
Start: 2025-04-21 | End: 2025-04-23 | Stop reason: HOSPADM

## 2025-04-21 RX ORDER — SODIUM CHLORIDE 0.9 % (FLUSH) 0.9 %
5-40 SYRINGE (ML) INJECTION PRN
Status: DISCONTINUED | OUTPATIENT
Start: 2025-04-21 | End: 2025-04-23 | Stop reason: HOSPADM

## 2025-04-21 RX ORDER — ACETAMINOPHEN 650 MG/1
650 SUPPOSITORY RECTAL EVERY 6 HOURS PRN
Status: DISCONTINUED | OUTPATIENT
Start: 2025-04-21 | End: 2025-04-23 | Stop reason: HOSPADM

## 2025-04-21 RX ORDER — IOPAMIDOL 755 MG/ML
75 INJECTION, SOLUTION INTRAVASCULAR
Status: COMPLETED | OUTPATIENT
Start: 2025-04-21 | End: 2025-04-21

## 2025-04-21 RX ORDER — POLYETHYLENE GLYCOL 3350 17 G/17G
17 POWDER, FOR SOLUTION ORAL DAILY PRN
Status: DISCONTINUED | OUTPATIENT
Start: 2025-04-21 | End: 2025-04-23 | Stop reason: HOSPADM

## 2025-04-21 RX ADMIN — SODIUM CHLORIDE, SODIUM LACTATE, POTASSIUM CHLORIDE, AND CALCIUM CHLORIDE 1000 ML: .6; .31; .03; .02 INJECTION, SOLUTION INTRAVENOUS at 12:18

## 2025-04-21 RX ADMIN — DROPERIDOL 1.25 MG: 2.5 INJECTION, SOLUTION INTRAMUSCULAR; INTRAVENOUS at 12:19

## 2025-04-21 RX ADMIN — IOPAMIDOL 75 ML: 755 INJECTION, SOLUTION INTRAVENOUS at 15:14

## 2025-04-21 RX ADMIN — SODIUM CHLORIDE, SODIUM LACTATE, POTASSIUM CHLORIDE, AND CALCIUM CHLORIDE: .6; .31; .03; .02 INJECTION, SOLUTION INTRAVENOUS at 16:10

## 2025-04-21 ASSESSMENT — ENCOUNTER SYMPTOMS
ABDOMINAL PAIN: 1
DIARRHEA: 1
COUGH: 0
VOMITING: 1
NAUSEA: 1
EYE REDNESS: 0
SHORTNESS OF BREATH: 0

## 2025-04-21 NOTE — ED PROVIDER NOTES
THE [facility]  EMERGENCY DEPARTMENT ENCOUNTER          PHYSICIAN ASSISTANT NOTE       Date of evaluation: 4/21/2025    Chief Complaint     Vomiting (Was seen yesterday and dx w/ bowel obstruction. )      History of Present Illness     Christine Hargrove is a 40 y.o. female who presents with persistent periumbilical abdominal pain, nausea with vomiting for the past 2 weeks.  Patient also reports associated diarrhea for the past 5 days.  She denies any fevers, cough, shortness of breath or chest pain, urinary symptoms.  She states that she was seen here in the emergency department yesterday.  She had a CT scan that showed a possible partial bowel obstruction.  Chart review reveals that general surgery felt her presentation was was more consistent with enteritis.  It was recommended that patient be admitted.  However, she states that she wanted to go home for [facility].  The patient reports that she is continue to have the above symptoms which prompted her to return to the hospital today.    ASSESSMENT / PLAN  (MEDICAL DECISION MAKING)     INITIAL VITALS: BP: 122/81, Temp: 98.2 °F (36.8 °C), Pulse: 81, Respirations: 16, SpO2: 98 %    Christine Hargrove is a 40 y.o. female who returns to the ED today with persistent periumbilical abdominal pain, nausea with vomiting for the past 2 weeks.  Patient also reports associated diarrhea for the past 5 days.  She denies any fevers, cough, shortness of breath or chest pain, urinary symptoms. She was seen here in the ED yesterday. CT a/p showed a possible partial bowel obstruction.  Chart review reveals that general surgery felt her presentation was was more consistent with enteritis but recommended she be admitted to medicine. She chose to go home. On exam today, she is afebrile with normal vital signs.  Abdomen is soft with mild periumbilical tenderness.    IV access was established.  Patient given 1 L bolus of IV fluids, droperidol.  Laboratory studies with normal blood

## 2025-04-21 NOTE — CONSULTS
General Surgery   Resident Consult Note    Reason for Consult: pSBO    History of Present Illness:   Christine Hargrove is a 40 y.o. female with Hx of tubal ligation (2008) and lap luisana (2013). Presents for n/v since the 9th, was seen yesterday in ER but patient did not want to stay due to the holiday and returns today for further evaluation. Reports diarrhea and emesis have continued since last visit. States she throws up every time after eating.  Last bowel movement this morning, reports it is yellow/green in color. Last emesis this am, reports it was green in color. No hx of UC or Crohns in the family. Reports she has had similar sx like this when she's had a stomach bug. Reports it feels like it but worse. Denies any sick contacts. Reports some mild abdominal discomfort. Reports she feels she might have a fever but no chills. Of note, she recently had a URI in the past week. No other medical complaints or injuries verbalized at this time. Last EGD and C-Scope >10 years ago. Fairly unremarkable.     Past Medical History:        Diagnosis Date    ADHD (attention deficit hyperactivity disorder)     Allergic rhinitis 07/25/2019    Asthma     Class 1 obesity in adult 01/28/2022    COVID-19 08/03/2021    COVID-19 07/25/2021    Dysthymia 03/14/2023    Intractable migraine with aura without status migrainosus 03/14/2023    Kidney calculi     PALOMA (obstructive sleep apnea) 01/28/2022    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     Seizures (HCC)        Past Surgical History:           Procedure Laterality Date    CHOLECYSTECTOMY      TUBAL LIGATION         Allergies:  Dye [iodides], Sulfa antibiotics, Coconut (cocos nucifera), Pineapple, Vicodin [hydrocodone-acetaminophen], Codeine, Morphine, and Penicillins    Medications:   Home Meds  No current facility-administered medications on file prior to encounter.     Current Outpatient Medications on File Prior to Encounter   Medication Sig Dispense Refill    promethazine  sensation intact    Labs:    CBC:   Recent Labs     04/20/25  0112 04/21/25  1222   WBC 9.1 8.7   HGB 15.8 14.3   HCT 45.7 40.5   MCV 88.0 86.4    307     BMP:   Recent Labs     04/20/25  0112 04/21/25  1222    140   K 3.7 4.3    110   CO2 25 23   BUN 11 8   CREATININE 0.9 0.8     PT/INR:   Recent Labs     04/20/25  0600   PROTIME 14.3   INR 1.09     APTT: No results for input(s): \"APTT\" in the last 72 hours.  Liver Profile:  Lab Results   Component Value Date/Time    AST 22 04/21/2025 12:22 PM    ALT 18 04/21/2025 12:22 PM    BILIDIR 0.2 04/21/2025 12:22 PM    BILITOT 0.5 04/21/2025 12:22 PM    ALKPHOS 90 04/21/2025 12:22 PM    PROTEIN 6.9 04/21/2025 12:22 PM    PROTEIN 7.8 06/12/2010 11:35 AM     Lab Results   Component Value Date/Time    CHOL 182 11/14/2022 09:43 AM    HDL 36 04/15/2025 10:20 AM    TRIG 131 11/14/2022 09:43 AM     UA:   Lab Results   Component Value Date/Time    NITRITE negative 04/07/2025 04:52 PM    COLORU Yellow 04/21/2025 12:22 PM    PHUR 8.0 04/21/2025 12:22 PM    PHUR 6.0 04/07/2025 04:52 PM    PHUR 6.5 11/28/2018 10:56 AM    WBCUA 0-2 04/20/2025 01:07 AM    RBCUA 3-4 04/20/2025 01:07 AM    MUCUS Rare 04/20/2025 01:07 AM    BACTERIA 3+ 04/20/2025 01:07 AM    CLARITYU Clear 04/21/2025 12:22 PM    SPECGRAV 1.025 04/07/2025 04:52 PM    LEUKOCYTESUR Negative 04/21/2025 12:22 PM    UROBILINOGEN 0.2 04/21/2025 12:22 PM    BILIRUBINUR Negative 04/21/2025 12:22 PM    BLOODU Negative 04/21/2025 12:22 PM    GLUCOSEU Negative 04/21/2025 12:22 PM       Imaging:   No orders to display          Assessment/Plan:  This is a 40 y.o. female with Hx of lap luisana and tubal ligation. Surgery consulted for CT findings consistent with pSBO    - No acute surgical intervention   - Findings likely consistent with an enteritis kind of picture given that she is continuing to have bowel function.   - Admission to medicine   -likely need GI consult for scope to look for etiology of continued

## 2025-04-21 NOTE — ED PROVIDER NOTES
ED Attending Attestation Note     Date of evaluation: 4/21/2025    This patient was seen by the advance practice provider.  I have seen and examined the patient, agree with the workup, evaluation, management and diagnosis. The care plan has been discussed.  My assessment reveals with periumbilical abdominal pain, CT yesterday showed poss SBO.  Admission recommended yesterday by surgery and admission to medicine recommended but she declined.  Agreeable today and will admit to medicine for further monitoring.     Seth Melo MD  04/21/25 [id number]

## 2025-04-21 NOTE — FLOWSHEET NOTE
04/21/25 1949   Vital Signs   Temp 98.8 °F (37.1 °C)   Temp Source Oral   Pulse 86   Heart Rate Source Monitor   Respirations 16   /65   MAP (Calculated) 80   BP Location Left upper arm   BP Method Automatic   Patient Position Semi melissawlers   Pain Assessment   Pain Assessment None - Denies Pain   Opioid-Induced Sedation   POSS Score 1   RASS   [place] Agitation Sedation Scale (RASS) 0   Oxygen Therapy   SpO2 99 %   Pulse Oximetry Type Intermittent   Pulse Oximeter Device Mode Intermittent   Pulse Oximeter Device Location Right;Finger   O2 Device None (Room air)   O2 Flow Rate (L/min) 0 L/min   Oxygen Therapy None (Room air)     Pt resting comfortably with S/O at bedside. IV infusing as ordered. Call light within reach. No needs expressed at this time. Will continue to monitor.

## 2025-04-21 NOTE — H&P
V2.0  History and Physical      Name:  Christine Hargrove /Age/Sex: 1985  (40 y.o. female)   MRN & CSN:  [id number] & [id number] Encounter Date/Time: 2025 3:58 PM EDT   Location:  [address] [place] [address] PCP: Vasquez Zapien DO       Hospital Day: 1    Assessment and Plan:   Christine Hargrove is a 40 y.o. female with no known sig PMH who presents with Intractable abdominal pain    Hospital Problems           Last Modified POA    * (Principal) Intractable abdominal pain 2025 Yes       Plan:  Admit to hospital  IVF  PRN analgesia and anti-emetics  Surgery consulted  Consulting GI  Repeat CT abdomen  Trend CMP    Disposition:   Current Living situation: Home  Expected Disposition: Home  Estimated D/C: 2+ days    Diet ADULT DIET; Clear Liquid  Diet NPO   DVT Prophylaxis [] Lovenox, []  Heparin, [] SCDs, [] Ambulation,  [] Eliquis, [] Xarelto, [] Coumadin   Code Status Full Code   Surrogate Decision Maker/ POA Spouse     Personally reviewed Lab Studies and Imaging       History from:     patient    History of Present Illness:     Chief Complaint: Abd Pain    Christine Hargrove is a 40 y.o. female with no known sig PMH who presents with subacute abdominal pain. Patient in usual state of health until ~2 weeks ago when she began experiencing periumbilical abdominal pain. Pain is persistent but worsens with eating. Also has been having intermittent vomiting for the same amount of time as well. For last few days has been having diarrhea as well. No hematemesis, hematochezia, melena. No dysuria. No known sick contacts or recent travel history. No prior history of similar symptoms. Has had prior cholecystectomy. Presented to ED as well yesterday with CT concerning for possible enteritis or partial obstruction. Surgery evaluated and low concern for active SBO. Admission offered but patient declined but represented today due to persistence of symptoms and admitted for further care.     Review of Systems:   Vital Sign     Unable to Pay for Housing in the Last Year: No     Number of Times Moved in the Last Year: 0     Homeless in the Last Year: No       Medications:   Medications:    sodium chloride flush  5-40 mL IntraVENous 2 times per day    [START ON 4/22/2025] enoxaparin  40 mg SubCUTAneous Daily      Infusions:    sodium chloride      lactated ringers       PRN Meds: sodium chloride flush, 5-40 mL, PRN  sodium chloride, , PRN  potassium chloride, 40 mEq, PRN   Or  potassium alternative oral replacement, 40 mEq, PRN   Or  potassium chloride, 10 mEq, PRN  magnesium sulfate, 2,000 mg, PRN  ondansetron, 4 mg, Q8H PRN   Or  ondansetron, 4 mg, Q6H PRN  acetaminophen, 650 mg, Q6H PRN   Or  acetaminophen, 650 mg, Q6H PRN  polyethylene glycol, 17 g, Daily PRN        Labs      CBC:   Recent Labs     04/20/25  0112 04/21/25  1222   WBC 9.1 8.7   HGB 15.8 14.3    307     BMP:    Recent Labs     04/20/25  0112 04/21/25  1222    140   K 3.7 4.3    110   CO2 25 23   BUN 11 8   CREATININE 0.9 0.8   GLUCOSE 98 83     Hepatic:   Recent Labs     04/20/25  0112 04/21/25  1222   AST 18 22   ALT 15 18   BILITOT 0.8 0.5   ALKPHOS 94 90     Lipids:   Lab Results   Component Value Date/Time    CHOL 182 11/14/2022 09:43 AM    HDL 36 04/15/2025 10:20 AM    TRIG 131 11/14/2022 09:43 AM     Hemoglobin A1C: No results found for: \"LABA1C\"  TSH:   Lab Results   Component Value Date/Time    TSH 1.07 04/15/2025 10:20 AM     Troponin: No results found for: \"TROPONINT\"  Lactic Acid:   Recent Labs     04/20/25  0515 04/21/25  1310   LACTA 0.5 0.9     BNP: No results for input(s): \"PROBNP\" in the last 72 hours.  UA:  Lab Results   Component Value Date/Time    NITRU Negative 04/21/2025 12:22 PM    COLORU Yellow 04/21/2025 12:22 PM    PHUR 8.0 04/21/2025 12:22 PM    PHUR 6.0 04/07/2025 04:52 PM    PHUR 6.5 11/28/2018 10:56 AM    WBCUA 0-2 04/21/2025 12:22 PM    RBCUA None seen 04/21/2025 12:22 PM    MUCUS Rare 04/20/2025 01:07 AM

## 2025-04-21 NOTE — ED NOTES
Patient Name: Christine Hargrove  : 1985 40 y.o.  MRN: [id number]  ED Room #: B16/B16-16     Chief complaint:   Chief Complaint   Patient presents with    Vomiting     Was seen yesterday and dx w/ bowel obstruction.      Hospital Problem/Diagnosis:   Hospital Problems           Last Modified POA    * (Principal) Intractable abdominal pain 2025 Yes         O2 Flow Rate:O2 Device: None (Room air)   (if applicable)  Cardiac Rhythm:   (if applicable)  Active LDA's:   Peripheral IV 25 Distal;Right Cephalic (Active)            How does patient ambulate? Low Fall Risk (Ambulates by themselves without support    2. How does patient take pills? Whole with Water    3. Is patient alert? Alert    4. Is patient oriented? To Person, To Place, To Time, To Situation, and Follows Commands    5.   Patient arrived from:  home  Facility Name: ___________________________________________    6. If patient is disoriented or from a Skill Nursing Facility has family been notified of admission?  N/A    7. Patient belongings?     Disposition of belongings? Kept with Patient     8. Any specific patient or family belongings/needs/dynamics?   a.     9. Miscellaneous comments/pending orders?  a. Pt still working on 2nd bottle of oral contrast      If there are any additional questions please reach out to the Emergency Department.       Uzma Busby RN  [phone]

## 2025-04-22 ENCOUNTER — ANESTHESIA EVENT (OUTPATIENT)
Dept: ENDOSCOPY | Age: 40
DRG: 392 | End: 2025-04-22
Payer: COMMERCIAL

## 2025-04-22 LAB
ALBUMIN SERPL-MCNC: 3.4 G/DL (ref 3.4–5)
ALBUMIN/GLOB SERPL: 1.4 {RATIO} (ref 1.1–2.2)
ALP SERPL-CCNC: 85 U/L (ref 40–129)
ALT SERPL-CCNC: 15 U/L (ref 10–40)
ANION GAP SERPL CALCULATED.3IONS-SCNC: 6 MMOL/L (ref 3–16)
AST SERPL-CCNC: 23 U/L (ref 15–37)
BASOPHILS # BLD: 0 K/UL (ref 0–0.2)
BASOPHILS NFR BLD: 0.6 %
BILIRUB SERPL-MCNC: 0.5 MG/DL (ref 0–1)
BUN SERPL-MCNC: 5 MG/DL (ref 7–20)
CALCIUM SERPL-MCNC: 9.5 MG/DL (ref 8.3–10.6)
CHLORIDE SERPL-SCNC: 109 MMOL/L (ref 99–110)
CO2 SERPL-SCNC: 23 MMOL/L (ref 21–32)
CREAT SERPL-MCNC: 0.8 MG/DL (ref 0.6–1.1)
DEPRECATED RDW RBC AUTO: 12.8 % (ref 12.4–15.4)
EOSINOPHIL # BLD: 1 K/UL (ref 0–0.6)
EOSINOPHIL NFR BLD: 13.6 %
GFR SERPLBLD CREATININE-BSD FMLA CKD-EPI: >90 ML/MIN/{1.73_M2}
GI PATHOGENS PNL STL NAA+PROBE: NORMAL
GLUCOSE SERPL-MCNC: 84 MG/DL (ref 70–99)
HCG UR QL: NEGATIVE
HCT VFR BLD AUTO: 38.8 % (ref 36–48)
HGB BLD-MCNC: 13.4 G/DL (ref 12–16)
LYMPHOCYTES # BLD: 2.2 K/UL (ref 1–5.1)
LYMPHOCYTES NFR BLD: 29.4 %
MCH RBC QN AUTO: 30.8 PG (ref 26–34)
MCHC RBC AUTO-ENTMCNC: 34.5 G/DL (ref 31–36)
MCV RBC AUTO: 89.3 FL (ref 80–100)
MONOCYTES # BLD: 0.4 K/UL (ref 0–1.3)
MONOCYTES NFR BLD: 5.9 %
NEUTROPHILS # BLD: 3.8 K/UL (ref 1.7–7.7)
NEUTROPHILS NFR BLD: 50.5 %
PLATELET # BLD AUTO: 279 K/UL (ref 135–450)
PMV BLD AUTO: 7.4 FL (ref 5–10.5)
POTASSIUM SERPL-SCNC: 4.5 MMOL/L (ref 3.5–5.1)
PROT SERPL-MCNC: 5.8 G/DL (ref 6.4–8.2)
RBC # BLD AUTO: 4.34 M/UL (ref 4–5.2)
SODIUM SERPL-SCNC: 138 MMOL/L (ref 136–145)
WBC # BLD AUTO: 7.5 K/UL (ref 4–11)

## 2025-04-22 PROCEDURE — 80053 COMPREHEN METABOLIC PANEL: CPT

## 2025-04-22 PROCEDURE — 85025 COMPLETE CBC W/AUTO DIFF WBC: CPT

## 2025-04-22 PROCEDURE — 2580000003 HC RX 258: Performed by: HOSPITALIST

## 2025-04-22 PROCEDURE — 84703 CHORIONIC GONADOTROPIN ASSAY: CPT

## 2025-04-22 PROCEDURE — 6370000000 HC RX 637 (ALT 250 FOR IP): Performed by: STUDENT IN AN ORGANIZED HEALTH CARE EDUCATION/TRAINING PROGRAM

## 2025-04-22 PROCEDURE — 1200000000 HC SEMI PRIVATE

## 2025-04-22 PROCEDURE — 2580000003 HC RX 258: Performed by: STUDENT IN AN ORGANIZED HEALTH CARE EDUCATION/TRAINING PROGRAM

## 2025-04-22 PROCEDURE — 36415 COLL VENOUS BLD VENIPUNCTURE: CPT

## 2025-04-22 PROCEDURE — 6370000000 HC RX 637 (ALT 250 FOR IP): Performed by: PHYSICIAN ASSISTANT

## 2025-04-22 RX ADMIN — SODIUM CHLORIDE, SODIUM LACTATE, POTASSIUM CHLORIDE, AND CALCIUM CHLORIDE: .6; .31; .03; .02 INJECTION, SOLUTION INTRAVENOUS at 09:09

## 2025-04-22 RX ADMIN — SODIUM CHLORIDE, SODIUM LACTATE, POTASSIUM CHLORIDE, AND CALCIUM CHLORIDE: .6; .31; .03; .02 INJECTION, SOLUTION INTRAVENOUS at 20:01

## 2025-04-22 RX ADMIN — ACETAMINOPHEN 650 MG: 325 TABLET, FILM COATED ORAL at 20:58

## 2025-04-22 RX ADMIN — SODIUM CHLORIDE, SODIUM LACTATE, POTASSIUM CHLORIDE, AND CALCIUM CHLORIDE: .6; .31; .03; .02 INJECTION, SOLUTION INTRAVENOUS at 00:21

## 2025-04-22 RX ADMIN — POLYETHYLENE GLYCOL-3350 AND ELECTROLYTES 2000 ML: 236; 6.74; 5.86; 2.97; 22.74 POWDER, FOR SOLUTION ORAL at 17:15

## 2025-04-22 ASSESSMENT — PAIN SCALES - GENERAL: PAINLEVEL_OUTOF10: 7

## 2025-04-22 ASSESSMENT — ENCOUNTER SYMPTOMS: SHORTNESS OF BREATH: 1

## 2025-04-22 ASSESSMENT — PAIN DESCRIPTION - FREQUENCY: FREQUENCY: INTERMITTENT

## 2025-04-22 ASSESSMENT — PAIN DESCRIPTION - ONSET: ONSET: PROGRESSIVE

## 2025-04-22 ASSESSMENT — PAIN DESCRIPTION - DESCRIPTORS: DESCRIPTORS: GNAWING

## 2025-04-22 ASSESSMENT — PAIN DESCRIPTION - LOCATION: LOCATION: ABDOMEN

## 2025-04-22 ASSESSMENT — PAIN DESCRIPTION - PAIN TYPE: TYPE: ACUTE PAIN

## 2025-04-22 ASSESSMENT — PAIN - FUNCTIONAL ASSESSMENT: PAIN_FUNCTIONAL_ASSESSMENT: ACTIVITIES ARE NOT PREVENTED

## 2025-04-22 ASSESSMENT — PAIN DESCRIPTION - ORIENTATION: ORIENTATION: MID

## 2025-04-22 NOTE — CARE COORDINATION
Case Management           Date/ Time of Note: 4/22/2025 2:56 PM  Note completed by: HORTENCIA Teague, JETT    If patient is discharged prior to next notation, then this note serves as note for discharge by case management.    Patient Name: Christine Hargrove  YOB: 1985    Diagnosis:Periumbilical abdominal pain [R10.33]  Intractable abdominal pain [R10.9]  Patient Admission Status: Inpatient  Date of Admission:4/21/2025 10:27 AM    Length of Stay: 1 GLOS: GMLOS: 2.5 Readmission Risk Score: Readmission Risk Score: 6.7    Current Plan of Care:   Pt is from home w/fiance and indp at baseline. Pt is currently up ab donna. Pt has no current services at home. Pt plans to dc home w/ no needs.     Plan for EGD and colonscopy tomorrow, CLD, NPO midnight, GI pathogens panel pending and GI following.     Referrals completed: Not Applicable    Resources/ information provided: Not indicated at this time    IMM Status:        Christine and her family were provided with choice of provider; she and her family are in agreement with the discharge plan.    Electronically signed by HORTENCIA Teague, JETT, TORIN on 4/22/2025 at 2:56 PM  [facility]  Case Management Department  Ph: [phone]

## 2025-04-22 NOTE — ANESTHESIA PRE PROCEDURE
Chronic tension-type headache, intractable G44.221   • Spell of altered cognition R41.89   • Dysthymia F34.1   • Intractable abdominal pain R10.9       Past Medical History:        Diagnosis Date   • ADHD (attention deficit hyperactivity disorder)    • Allergic rhinitis 07/25/2019   • Asthma    • Class 1 obesity in adult 01/28/2022   • COVID-19 08/03/2021   • COVID-19 07/25/2021   • Dysthymia 03/14/2023   • Intractable migraine with aura without status migrainosus 03/14/2023   • Kidney calculi    • PALOMA (obstructive sleep apnea) 01/28/2022   • 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    • Seizures (HCC)        Past Surgical History:        Procedure Laterality Date   • CHOLECYSTECTOMY     • TUBAL LIGATION         Social History:    Social History     Tobacco Use   • Smoking status: Never   • Smokeless tobacco: Never   Substance Use Topics   • Alcohol use: No                                Counseling given: Not Answered      Vital Signs (Current):   Vitals:    04/21/25 1605 04/21/25 1949 04/22/25 0306 04/22/25 0910   BP: 124/76 111/65 110/71 119/70   Pulse: 90 86 69 73   Resp: 16 16 16 16   Temp: 36.9 °C (98.4 °F) 37.1 °C (98.8 °F) 36.9 °C (98.4 °F) 36.7 °C (98 °F)   TempSrc: Oral Oral Oral Oral   SpO2: 99% 99% 95% 97%   Weight:       Height:                                                  BP Readings from Last 3 Encounters:   04/22/25 119/70   04/20/25 111/74   04/15/25 114/72       NPO Status:                                                                                 BMI:   Wt Readings from Last 3 Encounters:   04/21/25 85.4 kg (188 lb 4.4 oz)   04/20/25 83.5 kg (184 lb)   04/15/25 84.3 kg (185 lb 12.8 oz)     Body mass index is 34.44 kg/m².    CBC:   Lab Results   Component Value Date/Time    WBC 7.5 04/22/2025 04:58 AM    RBC 4.34 04/22/2025 04:58 AM    HGB 13.4 04/22/2025 04:58 AM    HCT 38.8 04/22/2025 04:58 AM    MCV 89.3 04/22/2025 04:58 AM    RDW 12.8 04/22/2025 04:58 AM     04/22/2025 04:58 AM

## 2025-04-22 NOTE — CONSULTS
Consultation Note    Patient Name: Christine Hargrove  : 1985  Age: 40 y.o.     Admitting Physician: Florencio Montiel MD   Date of Admission: 2025 10:27 AM   Primary Care Physician: Vasquez Zapien DO        Christine Hargrove is being seen at the request of Florencio Montiel MD for abdominal pain.    History of Present Illness:  40 year old female with history of PALOMA, seizures, migraines who presents with abdominal pain.    Patient states that her symptoms started on . She was with her mom who was receiving her chemo therapy and had to leave early as she felt dizzy. Then developed nausea and vomiting that was daily and ongoing for the past week. Through the course of her symptoms developed diarrhea with urgency and abdominal pain. Today is the first day in the last five days without BM or nausea or vomiting. Of note her mom who she was with prior to onset of symptoms also developed similar symptoms.    Patient has chronically constipation. Moves bowels about once a week. Takes dulcolax intermittently. Recently has been taking more NSAIDs recently. About 2-3 times a week to weather changes.    CT A/P on  showing mild fluid filled loops of distal jejunum and proximal ileum. Repeat Ct on  was unremarkable.  C. Diff negative  CBC, LFTs are unremarkable      GI History:  EGD and colonoscopy 2013     Past Medical History:  Past Medical History:   Diagnosis Date    ADHD (attention deficit hyperactivity disorder)     Allergic rhinitis 2019    Asthma     Class 1 obesity in adult 2022    COVID-19 2021    COVID-19 2021    Dysthymia 2023    Intractable migraine with aura without status migrainosus 2023    Kidney calculi     PALOMA (obstructive sleep apnea) 2022    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     Seizures (HCC)         Past Surgical History:  Past Surgical History:   Procedure Laterality Date    CHOLECYSTECTOMY      TUBAL LIGATION            Smokeless Tobacco Use  Never              Alcohol History       Alcohol Use Status  No              Drug Use       Drug Use Status  No              Sexual Activity       Sexually Active  Not Currently                     Family History:  Family History   Problem Relation Age of Onset    Other Mother         Hypoglycemia    Other Maternal Grandmother         Thyroid issues     Cancer Maternal Grandmother         Bone cancer     Diabetes Maternal Grandmother     Cancer Maternal Grandfather         Bone cancer     Diabetes Sister         Allergies:  Allergies   Allergen Reactions    Coconut (Cocos Nucifera)     Dye [Iodides] Palpitations and Other (See Comments)     Low HR    Hydrocodone Palpitations and Other (See Comments)     Low HR    Pineapple     Sulfa Antibiotics Hives, Itching and Nausea And Vomiting    Codeine Palpitations    Morphine Diarrhea    Penicillins Hives, Diarrhea, Rash and Other (See Comments)     Able to tolerate        ROS:   General: No fever or weight change  Hematologic: No unexpected submucosal bleeding or bruising  HEENT: No sore throat or facial pain  Respiratory: No cough or dyspnea  Cardiovascular: No angina or dependent edema  Gastrointestinal: See HPI  Musculoskeletal: No usual joint pain or stiffness  Skin: No skin eruptions or changing lesions  Neurologic: No focal weakness or numbness  Psychiatric: No anxiety or sleep disturbance    Physical Exam:  Vital Signs:   Vitals:    04/22/25 0306   BP: 110/71   Pulse: 69   Resp: 16   Temp: 98.4 °F (36.9 °C)   SpO2: 95%       General: Well-nourished, well-developed  HEENT: Sclera anicteric, mucosal membranes moist  Cardiovascular: Regular rate and rhythm.  No murmurs.  Respiratory: Respirations nonlabored, no crepitus  GI: Abdomen nondistended, soft, and nontender.  Normal active bowel sounds.  No masses palpable.   Rectal: Deferred  Musculoskeletal: No pitting edema of the lower legs.  Neurological: Gross memory appears intact.  Patient

## 2025-04-22 NOTE — PROGRESS NOTES
V2.0    [facility] Progress Note      Name:  Christine Hargrove /Age/Sex: 1985  (40 y.o. female)   MRN & CSN:  [id number] & [id number] Encounter Date/Time: 2025 12:05 PM EDT   Location:  [facility]- PCP: Vasquez Zapien DO     Attending:Florencio Montiel MD       Hospital Day: 2    Assessment and Recommendations   Christine Hargrove is a 40 y.o. female with no known sig PMH who presents with subacute abdominal pain. Patient in usual state of health until ~2 weeks ago when she began experiencing periumbilical abdominal pain. Pain is persistent but worsens with eating. Also has been having intermittent vomiting for the same amount of time as well. For last few days has been having diarrhea as well. Every times she eats she gets diarrhea     Pt presented to the ED . CT showed possible enteritis or partial obstruction. Surgery evaluated and low concern for active SBO.. She wanted to go home as she thought it might be a stomach bug but she presented again due to persistent symptoms     Abd pain  N/V/D  -CT abd is benign   -Over all improving . Pain is better . No more nausea or vomiting since yesterday's morning . She still has diarrhea  -She was seen by GI with plan for EGD and colonoscopy tomorrow  -Cont conservative management for now     DVT Ppx:lovenox    Code: full      Medical Decision Making:  The following items were considered in medical decision making:  Discussion of patient care with other providers. Consultant Notes reviewed  Reviewed clinical lab tests Independently if any  Microbiology cultures and other micro tests if any  Reviewed radiology tests Independently if any  Reviewed other diagnostic tests/interventions  Discussed the discharge plan in detail with case management        Subjective:      As above      Review of Systems:      Pertinent positives and negatives discussed in HPI    Objective:     Intake/Output Summary (Last 24 hours) at 2025 1205  Last data filed at 2025      Lipids:   Lab Results   Component Value Date/Time    CHOL 182 11/14/2022 09:43 AM    HDL 36 04/15/2025 10:20 AM    TRIG 131 11/14/2022 09:43 AM     Hemoglobin A1C: No results found for: \"LABA1C\"  TSH:   Lab Results   Component Value Date/Time    TSH 1.07 04/15/2025 10:20 AM     Troponin: No results found for: \"TROPONINT\"  Lactic Acid:   Recent Labs     04/20/25  0515 04/21/25  1310   LACTA 0.5 0.9     BNP: No results for input(s): \"PROBNP\" in the last 72 hours.  UA:  Lab Results   Component Value Date/Time    NITRU Negative 04/21/2025 12:22 PM    COLORU Yellow 04/21/2025 12:22 PM    PHUR 8.0 04/21/2025 12:22 PM    PHUR 6.0 04/07/2025 04:52 PM    PHUR 6.5 11/28/2018 10:56 AM    WBCUA 0-2 04/21/2025 12:22 PM    RBCUA None seen 04/21/2025 12:22 PM    MUCUS Rare 04/20/2025 01:07 AM    BACTERIA 3+ 04/21/2025 12:22 PM    CLARITYU Clear 04/21/2025 12:22 PM    SPECGRAV 1.025 04/07/2025 04:52 PM    LEUKOCYTESUR Negative 04/21/2025 12:22 PM    UROBILINOGEN 0.2 04/21/2025 12:22 PM    BILIRUBINUR Negative 04/21/2025 12:22 PM    BLOODU Negative 04/21/2025 12:22 PM    GLUCOSEU Negative 04/21/2025 12:22 PM    KETUA Negative 04/21/2025 12:22 PM     Urine Cultures:   Lab Results   Component Value Date/Time    LABURIN  10/01/2019 03:54 PM     <50,000 CFU/ml mixed skin/urogenital yasmany. No further workup     Blood Cultures: No results found for: \"BC\"  No results found for: \"BLOODCULT2\"  Organism:   Lab Results   Component Value Date/Time    ORG Staphylococcus aureus 09/13/2017 04:46 PM         Electronically signed by Florencio Montiel MD on 4/22/2025 at 12:05 PM

## 2025-04-22 NOTE — PROGRESS NOTES
General Surgery   Daily Progress Note  Patient: Christine Hargrove      CC:  Intractable abdominal pain     SUBJECTIVE:   Reports vomiting has lessened overnight. Denies nausea.     ROS:   A 14 point review of systems was conducted, significant findings as noted above. All other systems negative.    OBJECTIVE:    PHYSICAL EXAM:    Vitals:    04/21/25 1100 04/21/25 1605 04/21/25 1949 04/22/25 0306   BP: 123/72 124/76 111/65 110/71   Pulse: 81 90 86 69   Resp: 16 16 16 16   Temp:  98.4 °F (36.9 °C) 98.8 °F (37.1 °C) 98.4 °F (36.9 °C)   TempSrc:  Oral Oral Oral   SpO2: 100% 99% 99% 95%   Weight:       Height:           General appearance: alert, no acute distress, grooming appropriate  Chest/Lungs: normal effort, no adventitious breathing, no accessory muscle use, on RA  Cardiovascular: appears well perfused  Abdomen: soft, mildly tender to LUQ. Non-distended. Non-peritoneal.   Skin: warm and dry, no rashes  Extremities: no edema, no cyanosis  Neuro: A&Ox3, no focal deficits, sensation intact    ASSESSMENT & PLAN:   This is a 40 y.o. female with a complaints of Intractable abdominal pain. General surgery consulted for possible SBO. CT scan yesterday (4/21/25) of abdomen and pelvis demonstrate No evidence of small bowel obstruction.   No acute findings in the abdomen or pelvis.    - No surgery intervention warranted as CT findings show no SBO  -ok for a diet from surgery standpoint  -Continue with [facility]  -Encourage OOB, ambulation, IS  -Recommend patient f/u with GI for continued abdominal pain  -Rest of care per primary, including dispo, surgery to sign off, please reach out with any questions    Michelle Fragoso, CNP  General Surgery

## 2025-04-22 NOTE — PLAN OF CARE
Problem: Discharge Planning  Goal: Discharge to home or other facility with appropriate resources  Outcome: Progressing  Flowsheets (Taken 4/21/2025 1949)  Discharge to home or other facility with appropriate resources: Identify barriers to discharge with patient and caregiver     Problem: ABCDS Injury Assessment  Goal: Absence of physical injury  Outcome: Progressing     Problem: Gastrointestinal - Adult  Goal: Minimal or absence of nausea and vomiting  Outcome: Progressing  Goal: Maintains or returns to baseline bowel function  Outcome: Progressing  Goal: Maintains adequate nutritional intake  Outcome: Progressing     Problem: Metabolic/Fluid and Electrolytes - Adult  Goal: Electrolytes maintained within normal limits  Outcome: Progressing

## 2025-04-23 ENCOUNTER — ANESTHESIA (OUTPATIENT)
Dept: ENDOSCOPY | Age: 40
DRG: 392 | End: 2025-04-23
Payer: COMMERCIAL

## 2025-04-23 VITALS
WEIGHT: 188.27 LBS | RESPIRATION RATE: 16 BRPM | DIASTOLIC BLOOD PRESSURE: 76 MMHG | BODY MASS INDEX: 34.65 KG/M2 | SYSTOLIC BLOOD PRESSURE: 132 MMHG | OXYGEN SATURATION: 100 % | TEMPERATURE: 97.9 F | HEIGHT: 62 IN | HEART RATE: 57 BPM

## 2025-04-23 LAB
ALBUMIN SERPL-MCNC: 3.6 G/DL (ref 3.4–5)
ALBUMIN/GLOB SERPL: 1.6 {RATIO} (ref 1.1–2.2)
ALP SERPL-CCNC: 82 U/L (ref 40–129)
ALT SERPL-CCNC: 16 U/L (ref 10–40)
ANION GAP SERPL CALCULATED.3IONS-SCNC: 6 MMOL/L (ref 3–16)
AST SERPL-CCNC: 19 U/L (ref 15–37)
BILIRUB SERPL-MCNC: 0.5 MG/DL (ref 0–1)
BUN SERPL-MCNC: 3 MG/DL (ref 7–20)
CALCIUM SERPL-MCNC: 9.6 MG/DL (ref 8.3–10.6)
CHLORIDE SERPL-SCNC: 111 MMOL/L (ref 99–110)
CO2 SERPL-SCNC: 24 MMOL/L (ref 21–32)
CREAT SERPL-MCNC: 0.7 MG/DL (ref 0.6–1.1)
GFR SERPLBLD CREATININE-BSD FMLA CKD-EPI: >90 ML/MIN/{1.73_M2}
GLUCOSE SERPL-MCNC: 88 MG/DL (ref 70–99)
POTASSIUM SERPL-SCNC: 4 MMOL/L (ref 3.5–5.1)
PROT SERPL-MCNC: 5.9 G/DL (ref 6.4–8.2)
SODIUM SERPL-SCNC: 141 MMOL/L (ref 136–145)

## 2025-04-23 PROCEDURE — 6370000000 HC RX 637 (ALT 250 FOR IP): Performed by: PHYSICIAN ASSISTANT

## 2025-04-23 PROCEDURE — 2580000003 HC RX 258: Performed by: HOSPITALIST

## 2025-04-23 PROCEDURE — 3700000001 HC ADD 15 MINUTES (ANESTHESIA): Performed by: INTERNAL MEDICINE

## 2025-04-23 PROCEDURE — 80053 COMPREHEN METABOLIC PANEL: CPT

## 2025-04-23 PROCEDURE — 0DBE8ZX EXCISION OF LARGE INTESTINE, VIA NATURAL OR ARTIFICIAL OPENING ENDOSCOPIC, DIAGNOSTIC: ICD-10-PCS | Performed by: INTERNAL MEDICINE

## 2025-04-23 PROCEDURE — 2709999900 HC NON-CHARGEABLE SUPPLY: Performed by: INTERNAL MEDICINE

## 2025-04-23 PROCEDURE — 7100000010 HC PHASE II RECOVERY - FIRST 15 MIN: Performed by: INTERNAL MEDICINE

## 2025-04-23 PROCEDURE — 0DB98ZX EXCISION OF DUODENUM, VIA NATURAL OR ARTIFICIAL OPENING ENDOSCOPIC, DIAGNOSTIC: ICD-10-PCS | Performed by: INTERNAL MEDICINE

## 2025-04-23 PROCEDURE — 3609010300 HC COLONOSCOPY W/BIOPSY SINGLE/MULTIPLE: Performed by: INTERNAL MEDICINE

## 2025-04-23 PROCEDURE — 7100000011 HC PHASE II RECOVERY - ADDTL 15 MIN: Performed by: INTERNAL MEDICINE

## 2025-04-23 PROCEDURE — 2500000003 HC RX 250 WO HCPCS: Performed by: STUDENT IN AN ORGANIZED HEALTH CARE EDUCATION/TRAINING PROGRAM

## 2025-04-23 PROCEDURE — 3609012400 HC EGD TRANSORAL BIOPSY SINGLE/MULTIPLE: Performed by: INTERNAL MEDICINE

## 2025-04-23 PROCEDURE — 6360000002 HC RX W HCPCS

## 2025-04-23 PROCEDURE — 3700000000 HC ANESTHESIA ATTENDED CARE: Performed by: INTERNAL MEDICINE

## 2025-04-23 PROCEDURE — 36415 COLL VENOUS BLD VENIPUNCTURE: CPT

## 2025-04-23 PROCEDURE — 88305 TISSUE EXAM BY PATHOLOGIST: CPT

## 2025-04-23 PROCEDURE — 0DB68ZX EXCISION OF STOMACH, VIA NATURAL OR ARTIFICIAL OPENING ENDOSCOPIC, DIAGNOSTIC: ICD-10-PCS | Performed by: INTERNAL MEDICINE

## 2025-04-23 RX ORDER — GLYCOPYRROLATE 0.2 MG/ML
INJECTION INTRAMUSCULAR; INTRAVENOUS
Status: DISCONTINUED | OUTPATIENT
Start: 2025-04-23 | End: 2025-04-23 | Stop reason: SDUPTHER

## 2025-04-23 RX ORDER — DICYCLOMINE HCL 20 MG
20 TABLET ORAL
Qty: 120 TABLET | Refills: 3 | Status: SHIPPED | OUTPATIENT
Start: 2025-04-23

## 2025-04-23 RX ORDER — PROPOFOL 10 MG/ML
INJECTION, EMULSION INTRAVENOUS
Status: DISCONTINUED | OUTPATIENT
Start: 2025-04-23 | End: 2025-04-23 | Stop reason: SDUPTHER

## 2025-04-23 RX ORDER — DICYCLOMINE HCL 20 MG
20 TABLET ORAL
Status: DISCONTINUED | OUTPATIENT
Start: 2025-04-23 | End: 2025-04-23 | Stop reason: HOSPADM

## 2025-04-23 RX ORDER — LIDOCAINE HCL/PF 100 MG/5ML
SYRINGE (ML) INJECTION
Status: DISCONTINUED | OUTPATIENT
Start: 2025-04-23 | End: 2025-04-23 | Stop reason: SDUPTHER

## 2025-04-23 RX ADMIN — GLYCOPYRROLATE 0.2 MG: 0.2 INJECTION INTRAMUSCULAR; INTRAVENOUS at 12:35

## 2025-04-23 RX ADMIN — POLYETHYLENE GLYCOL-3350 AND ELECTROLYTES 2000 ML: 236; 6.74; 5.86; 2.97; 22.74 POWDER, FOR SOLUTION ORAL at 05:34

## 2025-04-23 RX ADMIN — Medication 80 MG: at 12:36

## 2025-04-23 RX ADMIN — PHENYLEPHRINE HYDROCHLORIDE 100 MCG: 10 INJECTION, SOLUTION INTRAMUSCULAR; INTRAVENOUS; SUBCUTANEOUS at 12:50

## 2025-04-23 RX ADMIN — PHENYLEPHRINE HYDROCHLORIDE 50 MCG: 10 INJECTION, SOLUTION INTRAMUSCULAR; INTRAVENOUS; SUBCUTANEOUS at 12:53

## 2025-04-23 RX ADMIN — PROPOFOL 50 MG: 10 INJECTION, EMULSION INTRAVENOUS at 12:36

## 2025-04-23 RX ADMIN — SODIUM CHLORIDE, PRESERVATIVE FREE 10 ML: 5 INJECTION INTRAVENOUS at 09:29

## 2025-04-23 RX ADMIN — PROPOFOL 30 MG: 10 INJECTION, EMULSION INTRAVENOUS at 12:37

## 2025-04-23 RX ADMIN — PROPOFOL 150 MCG/KG/MIN: 10 INJECTION, EMULSION INTRAVENOUS at 12:36

## 2025-04-23 RX ADMIN — SODIUM CHLORIDE, SODIUM LACTATE, POTASSIUM CHLORIDE, AND CALCIUM CHLORIDE: .6; .31; .03; .02 INJECTION, SOLUTION INTRAVENOUS at 05:37

## 2025-04-23 ASSESSMENT — PAIN - FUNCTIONAL ASSESSMENT
PAIN_FUNCTIONAL_ASSESSMENT: NONE - DENIES PAIN
PAIN_FUNCTIONAL_ASSESSMENT: WONG-BAKER FACES

## 2025-04-23 NOTE — PROGRESS NOTES
Nutrition    Nutrition screening referral was triggered based on results obtained during nursing admission assessment for Unintentional Weight Loss.    The patient's chart was reviewed and nutrition assessment is not indicated at this time.  Patient will be seen per nutrition standards of care.         Trinidad Oliveira RD, LD  Gianluca:  [phone]

## 2025-04-23 NOTE — FLOWSHEET NOTE
04/22/25 2002   Vital Signs   Temp 98.7 °F (37.1 °C)   Temp Source Oral   Pulse 69   Heart Rate Source Monitor   Respirations 16   /78   MAP (Calculated) 92   BP Location Right upper arm   BP Method Automatic   Patient Position Semi fowlers   Pain Assessment   Pain Assessment None - Denies Pain   Opioid-Induced Sedation   POSS Score 1   RASS   [place] Agitation Sedation Scale (RASS) 0   Oxygen Therapy   SpO2 99 %   Pulse Oximetry Type Intermittent   Pulse Oximeter Device Mode Intermittent   Pulse Oximeter Device Location Left;Finger   O2 Device None (Room air)   O2 Flow Rate (L/min) 0 L/min   Oxygen Therapy None (Room air)     Pt resting comfortably in bed. IV infusing as ordered. Call light within reach. No needs expressed at this time. Will continue to monitor.

## 2025-04-23 NOTE — FLOWSHEET NOTE
04/23/25 0245   Vital Signs   Temp 98.2 °F (36.8 °C)   Temp Source Oral   Pulse 64   Heart Rate Source Monitor   Respirations 16   BP (!) 112/54   MAP (Calculated) 73   BP Location Right upper arm   BP Method Automatic   Patient Position Semi melissawlers   Pain Assessment   Pain Assessment None - Denies Pain   Care Plan - Pain Goals   Verbalizes/displays adequate comfort level or baseline comfort level Encourage patient to monitor pain and request assistance   Oxygen Therapy   SpO2 97 %   Pulse Oximetry Type Intermittent   Pulse Oximeter Device Mode Intermittent   Pulse Oximeter Device Location Left;Finger   O2 Device None (Room air)   O2 Flow Rate (L/min) 0 L/min   Oxygen Therapy None (Room air)

## 2025-04-23 NOTE — H&P
Gastroenterology Note             Pre-operative History and Physical    Patient: Christine Hargrove  : 1985  CSN: [id number]    History Obtained From:  Patient and/or guardian.     HISTORY OF PRESENT ILLNESS:    Indication: The patient is a 40 y.o. female  here for EGD/colonoscopy. Unexplained abdominal pain, nausea/vomiting, and diarrhea.     Past Medical History:    Past Medical History:   Diagnosis Date    ADHD (attention deficit hyperactivity disorder)     Allergic rhinitis 2019    Asthma     Class 1 obesity in adult 2022    COVID-19 2021    COVID-19 2021    Dysthymia 2023    Intractable migraine with aura without status migrainosus 2023    Kidney calculi     PALOMA (obstructive sleep apnea) 2022    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     Seizures (HCC)      Past Surgical History:    Past Surgical History:   Procedure Laterality Date    CHOLECYSTECTOMY      TUBAL LIGATION       Medications Prior to Admission:   No current facility-administered medications on file prior to encounter.     Current Outpatient Medications on File Prior to Encounter   Medication Sig Dispense Refill    promethazine (PHENERGAN) 25 MG tablet Take 1 tablet by mouth every 6 hours as needed for Nausea WARNING:  May cause drowsiness.  May impair ability to operate vehicles or machinery.  Do not use in combination with alcohol. 20 tablet 0    ondansetron (ZOFRAN-ODT) 4 MG disintegrating tablet Take 1 tablet by mouth 3 times daily as needed for Nausea or Vomiting 21 tablet 0        Allergies:  Coconut (cocos nucifera), Dye [iodides], Hydrocodone, Pineapple, Sulfa antibiotics, Codeine, Morphine, and Penicillins      Social History:   Social History     Tobacco Use    Smoking status: Never    Smokeless tobacco: Never   Substance Use Topics    Alcohol use: No     Family History:   Family History   Problem Relation Age of Onset    Other Mother         Hypoglycemia    Other Maternal

## 2025-04-23 NOTE — PLAN OF CARE
Problem: Discharge Planning  Goal: Discharge to home or other facility with appropriate resources  Outcome: Progressing  Flowsheets (Taken 4/22/2025 2002)  Discharge to home or other facility with appropriate resources: Identify barriers to discharge with patient and caregiver     Problem: ABCDS Injury Assessment  Goal: Absence of physical injury  Outcome: Progressing     Problem: Gastrointestinal - Adult  Goal: Minimal or absence of nausea and vomiting  Outcome: Progressing  Flowsheets (Taken 4/22/2025 2002)  Minimal or absence of nausea and vomiting: Administer IV fluids as ordered to ensure adequate hydration  Goal: Maintains or returns to baseline bowel function  Outcome: Progressing  Flowsheets (Taken 4/22/2025 2002)  Maintains or returns to baseline bowel function: Assess bowel function  Goal: Maintains adequate nutritional intake  Outcome: Progressing  Flowsheets (Taken 4/22/2025 2002)  Maintains adequate nutritional intake: Monitor percentage of each meal consumed     Problem: Metabolic/Fluid and Electrolytes - Adult  Goal: Electrolytes maintained within normal limits  Outcome: Progressing  Flowsheets (Taken 4/22/2025 2002)  Electrolytes maintained within normal limits: Monitor labs and assess patient for signs and symptoms of electrolyte imbalances

## 2025-04-23 NOTE — DISCHARGE SUMMARY
V2.0  Discharge Summary    Name:  Christine Hargrove /Age/Sex: 1985 (40 y.o. female)   Admit Date: 2025  Discharge Date: 25    MRN & CSN:  [id number] & [id number] Encounter Date and Time 25 9:01 AM EDT    Attending:  No att. providers found Discharging Provider: Florencio Montiel MD       Hospital Course:     Brief HPI: Christine Hargrove is a 40 y.o. female with no known sig PMH who presents with subacute abdominal pain. Patient in usual state of health until ~2 weeks ago when she began experiencing periumbilical abdominal pain. Pain is persistent but worsens with eating. This was associate with nausea and vomiting that was followed later by diarrhea .     Pt presented to the ED . CT showed possible enteritis or partial obstruction. Surgery evaluated and low concern for active SBO.. She wanted to go home as she thought it might be a stomach bug but she presented again due to persistent symptoms      Abd pain  N/V/D  -CT abd is benign   -Over all improving . Pain is better . No more nausea or vomiting . She still has diarrhea although improved   -She was seen by GI who did an EGD and colonoscopy which showed non-erosive gastritis, Prominent ileal lymphoid aggregates, Mild colonic edema,Spastic colonic contractions intermittently. They think this is represent lingering symptoms from infectious enterocolitis and recommended to cont conservative management   -she felt better and tolerated diet     Pt was discharged in stable conditions .     The patient expressed appropriate understanding of, and agreement with the discharge recommendations, medications, and plan.     Consults this admission:  IP CONSULT TO GENERAL SURGERY  IP CONSULT TO GI    Discharge Diagnosis:   See above     Discharge Instruction:   Follow up appointments: PCP  Primary care physician: Vasquez Zapien DO within 1 week  Diet: regular diet   Activity: activity as tolerated    Discharge Medications:        Medication List

## 2025-04-23 NOTE — ANESTHESIA POSTPROCEDURE EVALUATION
Department of Anesthesiology  Postprocedure Note    Patient: Christine Hargrove  MRN: [id number]  YOB: 1985  Date of evaluation: 4/23/2025    Procedure Summary       Date: 04/23/25 Room / Location: [facility] ENDO 01 / [facility]    Anesthesia Start: 1231 Anesthesia Stop: 1317    Procedures:       ESOPHAGOGASTRODUODENOSCOPY BIOPSY      COLONOSCOPY BIOPSY Diagnosis:       Abdominal pain, unspecified abdominal location      Nausea and vomiting, unspecified vomiting type      (Abdominal pain, unspecified abdominal location [R10.9])      (Nausea and vomiting, unspecified vomiting type [R11.2])    Surgeons: Matt Scruggs MD Responsible Provider: Jordan Nichole MD    Anesthesia Type: MAC ASA Status: 2            Anesthesia Type: No value filed.    Herber Phase I: Herber Score: 10    Herber Phase II: Herber Score: 10    Anesthesia Post Evaluation    Patient location during evaluation: PACU  Patient participation: complete - patient participated  Level of consciousness: awake  Pain score: 0  Airway patency: patent  Nausea & Vomiting: no nausea  Cardiovascular status: hemodynamically stable  Respiratory status: acceptable  Hydration status: stable  Pain management: adequate    No notable events documented.

## 2025-04-23 NOTE — OP NOTE
EGD and Colonoscopy Procedure  Note            Patient: Christine Hargrove  : 1985  CSN: [id number]    Procedure: Esophagogastroduodenoscopy with biopsy and Colonoscopy with biopsy      Date:  2025     Surgeon:  Matt Scruggs MD     Referring Physician:   Vasquez Zapien DO    Preoperative Diagnosis: Nausea and vomiting, periumbilical pain, chronic diarrhea    Postoperative Diagnosis: Per impressions below    Anesthesia:  MAC per anesthesia record     EBL: <5 mL    Indications: This is a 40 y.o. year old female who presents today with persistent periumbilical abdominal pain, diarrhea, and nausea/vomiting unexplained by noninvasive testing.      EGD Procedure Details:    Informed consent was obtained from the patient after explanation of indications, benefits and possible risks and complications of the procedures.  The patient was then taken to the endoscopy suite, placed in the left lateral decubitus position, and the above IV sedation was administrered.    The Olympus gastroscope was passed through the hypopharynx into the esophagus. The scope was advanced to the second portion of the duodenum.  The mucosa was carefully examined, including gastric retroflexion. The scope was withdrawn and the procedure was terminated with findings as indicated below:     EGD Findings:     Esophagus:   -Small inlet patch incidentally noted in the proximal esophagus.  -Z-line was noted at 38 cm.  -The esophagus was otherwise normal.     Stomach:   -Mild striped and patchy erythema with no associated ulcer or erosions noted in the gastric body and antrum.  Biopsies obtained with a cold forceps from the antrum, body, and incisura to assess for H. pylori.  -The stomach was otherwise normal.     Duodenum:   -The examined duodenum was normal.  Random biopsies obtained with a cold forceps from the second portion of the duodenum and the duodenal bulb to assess for enteropathy to include celiac

## 2025-04-24 ENCOUNTER — TELEPHONE (OUTPATIENT)
Dept: FAMILY MEDICINE CLINIC | Age: 40
End: 2025-04-24

## 2025-04-24 NOTE — TELEPHONE ENCOUNTER
Care Transitions Initial Follow Up Call    Outreach made within 2 business days of discharge: Yes    Patient: Christine Hargrove Patient : 1985   MRN: [id number]  Reason for Admission: Periumbilical abdominal pain   Discharge Date: 25       Spoke with: Patient     Discharge department/facility: [facility] Interactive Patient Contact:  Was patient able to fill all prescriptions: Yes  Was patient instructed to bring all medications to the follow-up visit: Yes  Is patient taking all medications as directed in the discharge summary? Yes  Does patient understand their discharge instructions: Yes  Does patient have questions or concerns that need addressed prior to 7-14 day follow up office visit: no    Additional needs identified to be addressed with provider  No needs identified             Scheduled appointment with PCP within 7-14 days    Follow Up  Future Appointments   Date Time Provider Department Center   2025 10:00 AM Aishwarya Perez, APRN - CNP [facility] MARISOL Sifuentes

## 2025-04-24 NOTE — TELEPHONE ENCOUNTER
LMTCB   Care Transitions Initial Follow Up Call    Outreach made within 2 business days of discharge: Yes    Patient: Christine Hargrove Patient : 1985   MRN: [id number]  Reason for Admission: Yazdanism   Discharge Date: 25       Spoke with:     Discharge department/facility:     [facility] Interactive Patient Contact:  Was patient able to fill all prescriptions:   Was patient instructed to bring all medications to the follow-up visit: Is patient taking all medications as directed in the discharge summary?   Does patient understand their discharge instructions:   Does patient have questions or concerns that need addressed prior to 7-14 day follow up office visit:     Additional needs identified to be addressed with provider               Scheduled appointment with PCP within 7-14 days    Follow Up  No future appointments.    Mitzy Sifuentes

## 2025-04-25 ENCOUNTER — OFFICE VISIT (OUTPATIENT)
Dept: FAMILY MEDICINE CLINIC | Age: 40
End: 2025-04-25
Payer: COMMERCIAL

## 2025-04-25 VITALS
HEART RATE: 87 BPM | WEIGHT: 184 LBS | RESPIRATION RATE: 16 BRPM | OXYGEN SATURATION: 97 % | SYSTOLIC BLOOD PRESSURE: 118 MMHG | DIASTOLIC BLOOD PRESSURE: 82 MMHG | TEMPERATURE: 98.2 F | BODY MASS INDEX: 33.65 KG/M2

## 2025-04-25 DIAGNOSIS — K29.30 CHRONIC SUPERFICIAL GASTRITIS WITHOUT BLEEDING: ICD-10-CM

## 2025-04-25 DIAGNOSIS — Z09 HOSPITAL DISCHARGE FOLLOW-UP: Primary | ICD-10-CM

## 2025-04-25 DIAGNOSIS — R10.84 GENERALIZED ABDOMINAL PAIN: ICD-10-CM

## 2025-04-25 PROCEDURE — 1111F DSCHRG MED/CURRENT MED MERGE: CPT | Performed by: NURSE PRACTITIONER

## 2025-04-25 PROCEDURE — 99214 OFFICE O/P EST MOD 30 MIN: CPT | Performed by: NURSE PRACTITIONER

## 2025-04-25 RX ORDER — PANTOPRAZOLE SODIUM 40 MG/1
40 TABLET, DELAYED RELEASE ORAL
Qty: 30 TABLET | Refills: 1 | Status: SHIPPED | OUTPATIENT
Start: 2025-04-25

## 2025-04-25 RX ORDER — SUCRALFATE 1 G/1
1 TABLET ORAL 4 TIMES DAILY
Qty: 40 TABLET | Refills: 0 | Status: SHIPPED | OUTPATIENT
Start: 2025-04-25 | End: 2025-05-05

## 2025-04-25 ASSESSMENT — ENCOUNTER SYMPTOMS
NAUSEA: 1
ABDOMINAL PAIN: 1
DIARRHEA: 1
RESPIRATORY NEGATIVE: 1

## 2025-04-25 NOTE — PATIENT INSTRUCTIONS
Limit aleve  Watch spicy and greasy  Start pantoprazole daily on empty stomach  Take carafate prior to eating

## 2025-04-25 NOTE — PROGRESS NOTES
Prominent ileal lymphoid aggregates, Mild colonic edema,Spastic colonic contractions intermittently. They think this is represent lingering symptoms from infectious enterocolitis and recommended to cont conservative management   -she felt better and tolerated diet      Pt was discharged in stable conditions .     Interval history/Current status:   History of Present Illness  The patient presents for evaluation of upper abdominal discomfort.    She was initially evaluated in the emergency room on 04/20/2025 due to a gastrointestinal infection. Despite an initial improvement, her condition deteriorated, necessitating a return to the hospital the following day. During her hospitalization, intravenous fluids were administered, and a repeat CT scan, colonoscopy, and endoscopy were performed. The colonoscopy results were unremarkable, while the endoscopy revealed mild gastric irritation without any ulcers. Upon discharge, she reported feeling slightly better but continues to experience upper abdominal discomfort. The pain is described as stabbing and occurs during meals or when consuming liquids. Occasional symptoms include acid reflux, heartburn, an unpleasant taste in the mouth, and a cough when lying down. No presence of black stools is reported. Bentyl has been prescribed for cramping, taken four times daily with intermittent relief. Aleve is used approximately twice- three times weekly for pain management. She has previously taken Pepcid for similar symptoms following gallbladder removal surgery, which resulted in a month-long hospital stay due to her inability to eat or drink. She does not have a follow-up appointment with gastroenterology. Omeprazole has not been taken for an unspecified duration, possibly within the last six months. She notes she was feeling better while she was on the medication    Difficulty eating and frequent vomiting have been experienced since 04/08/2025. Over the past six days, diarrhea has

## 2025-04-28 ENCOUNTER — TELEPHONE (OUTPATIENT)
Dept: FAMILY MEDICINE CLINIC | Age: 40
End: 2025-04-28

## 2025-04-28 NOTE — TELEPHONE ENCOUNTER
Patient called into the office today stating she needs a return to work note. Patient saw you 04/25/2025 for Hospital follow up. Patient missed all last week 04/21-04/25 are you will to write note due to  being out of office. Patient would like to return today.

## 2025-04-29 ENCOUNTER — TELEPHONE (OUTPATIENT)
Dept: FAMILY MEDICINE CLINIC | Age: 40
End: 2025-04-29

## 2025-05-12 PROBLEM — R11.2 NAUSEA AND VOMITING: Status: ACTIVE | Noted: 2025-05-12

## 2025-05-12 PROBLEM — R10.33 PERIUMBILICAL ABDOMINAL PAIN: Status: ACTIVE | Noted: 2025-05-12

## 2025-08-08 ENCOUNTER — OFFICE VISIT (OUTPATIENT)
Dept: FAMILY MEDICINE CLINIC | Age: 40
End: 2025-08-08
Payer: COMMERCIAL

## 2025-08-08 VITALS
BODY MASS INDEX: 32.64 KG/M2 | DIASTOLIC BLOOD PRESSURE: 78 MMHG | OXYGEN SATURATION: 97 % | WEIGHT: 184.2 LBS | HEART RATE: 62 BPM | RESPIRATION RATE: 16 BRPM | HEIGHT: 63 IN | TEMPERATURE: 97.1 F | SYSTOLIC BLOOD PRESSURE: 118 MMHG

## 2025-08-08 DIAGNOSIS — R47.9 SPEECH DISTURBANCE, UNSPECIFIED TYPE: ICD-10-CM

## 2025-08-08 DIAGNOSIS — F41.9 ANXIETY: ICD-10-CM

## 2025-08-08 DIAGNOSIS — R40.4 TRANSIENT ALTERATION OF AWARENESS: ICD-10-CM

## 2025-08-08 DIAGNOSIS — R53.83 OTHER FATIGUE: ICD-10-CM

## 2025-08-08 DIAGNOSIS — R53.83 OTHER FATIGUE: Primary | ICD-10-CM

## 2025-08-08 LAB
ALBUMIN SERPL-MCNC: 4.4 G/DL (ref 3.4–5)
ALBUMIN/GLOB SERPL: 1.6 {RATIO} (ref 1.1–2.2)
ALP SERPL-CCNC: 96 U/L (ref 40–129)
ALT SERPL-CCNC: 18 U/L (ref 10–40)
ANION GAP SERPL CALCULATED.3IONS-SCNC: 10 MMOL/L (ref 3–16)
AST SERPL-CCNC: 19 U/L (ref 15–37)
BASOPHILS # BLD: 0.1 K/UL (ref 0–0.2)
BASOPHILS NFR BLD: 0.9 %
BILIRUB SERPL-MCNC: 0.7 MG/DL (ref 0–1)
BUN SERPL-MCNC: 11 MG/DL (ref 7–20)
CALCIUM SERPL-MCNC: 10.2 MG/DL (ref 8.3–10.6)
CHLORIDE SERPL-SCNC: 107 MMOL/L (ref 99–110)
CO2 SERPL-SCNC: 23 MMOL/L (ref 21–32)
CREAT SERPL-MCNC: 0.7 MG/DL (ref 0.6–1.1)
CRP SERPL-MCNC: <3 MG/L (ref 0–5.1)
DEPRECATED RDW RBC AUTO: 13.2 % (ref 12.4–15.4)
EOSINOPHIL # BLD: 0.1 K/UL (ref 0–0.6)
EOSINOPHIL NFR BLD: 1.6 %
ERYTHROCYTE [SEDIMENTATION RATE] IN BLOOD BY WESTERGREN METHOD: 6 MM/HR (ref 0–20)
FOLATE SERPL-MCNC: 10.3 NG/ML (ref 4.78–24.2)
GFR SERPLBLD CREATININE-BSD FMLA CKD-EPI: >90 ML/MIN/{1.73_M2}
GLUCOSE SERPL-MCNC: 99 MG/DL (ref 70–99)
HCT VFR BLD AUTO: 42.5 % (ref 36–48)
HGB BLD-MCNC: 14.9 G/DL (ref 12–16)
LYMPHOCYTES # BLD: 1.8 K/UL (ref 1–5.1)
LYMPHOCYTES NFR BLD: 26.9 %
MCH RBC QN AUTO: 29.9 PG (ref 26–34)
MCHC RBC AUTO-ENTMCNC: 35 G/DL (ref 31–36)
MCV RBC AUTO: 85.5 FL (ref 80–100)
MONOCYTES # BLD: 0.3 K/UL (ref 0–1.3)
MONOCYTES NFR BLD: 5.3 %
NEUTROPHILS # BLD: 4.3 K/UL (ref 1.7–7.7)
NEUTROPHILS NFR BLD: 65.3 %
PLATELET # BLD AUTO: 331 K/UL (ref 135–450)
PMV BLD AUTO: 7.7 FL (ref 5–10.5)
POTASSIUM SERPL-SCNC: 4.6 MMOL/L (ref 3.5–5.1)
PROT SERPL-MCNC: 7.1 G/DL (ref 6.4–8.2)
RBC # BLD AUTO: 4.97 M/UL (ref 4–5.2)
SODIUM SERPL-SCNC: 140 MMOL/L (ref 136–145)
T4 FREE SERPL-MCNC: 0.9 NG/DL (ref 0.9–1.8)
TSH SERPL DL<=0.005 MIU/L-ACNC: 1.35 UIU/ML (ref 0.27–4.2)
VIT B12 SERPL-MCNC: 409 PG/ML (ref 211–911)
WBC # BLD AUTO: 6.5 K/UL (ref 4–11)

## 2025-08-08 PROCEDURE — 99213 OFFICE O/P EST LOW 20 MIN: CPT | Performed by: FAMILY MEDICINE

## 2025-08-08 ASSESSMENT — PATIENT HEALTH QUESTIONNAIRE - PHQ9
3. TROUBLE FALLING OR STAYING ASLEEP: NOT AT ALL
9. THOUGHTS THAT YOU WOULD BE BETTER OFF DEAD, OR OF HURTING YOURSELF: NOT AT ALL
5. POOR APPETITE OR OVEREATING: NOT AT ALL
10. IF YOU CHECKED OFF ANY PROBLEMS, HOW DIFFICULT HAVE THESE PROBLEMS MADE IT FOR YOU TO DO YOUR WORK, TAKE CARE OF THINGS AT HOME, OR GET ALONG WITH OTHER PEOPLE: NOT DIFFICULT AT ALL
1. LITTLE INTEREST OR PLEASURE IN DOING THINGS: NOT AT ALL
8. MOVING OR SPEAKING SO SLOWLY THAT OTHER PEOPLE COULD HAVE NOTICED. OR THE OPPOSITE, BEING SO FIGETY OR RESTLESS THAT YOU HAVE BEEN MOVING AROUND A LOT MORE THAN USUAL: NOT AT ALL
4. FEELING TIRED OR HAVING LITTLE ENERGY: NOT AT ALL
SUM OF ALL RESPONSES TO PHQ QUESTIONS 1-9: 0
6. FEELING BAD ABOUT YOURSELF - OR THAT YOU ARE A FAILURE OR HAVE LET YOURSELF OR YOUR FAMILY DOWN: NOT AT ALL
7. TROUBLE CONCENTRATING ON THINGS, SUCH AS READING THE NEWSPAPER OR WATCHING TELEVISION: NOT AT ALL
SUM OF ALL RESPONSES TO PHQ QUESTIONS 1-9: 0
2. FEELING DOWN, DEPRESSED OR HOPELESS: NOT AT ALL

## 2025-08-08 ASSESSMENT — ENCOUNTER SYMPTOMS: SHORTNESS OF BREATH: 1

## 2025-08-09 ENCOUNTER — RESULTS FOLLOW-UP (OUTPATIENT)
Dept: FAMILY MEDICINE CLINIC | Age: 40
End: 2025-08-09

## 2025-08-13 DIAGNOSIS — R40.4 TRANSIENT ALTERATION OF AWARENESS: ICD-10-CM

## 2025-08-13 DIAGNOSIS — R47.9 SPEECH DISTURBANCE, UNSPECIFIED TYPE: ICD-10-CM

## 2025-08-13 DIAGNOSIS — R53.83 OTHER FATIGUE: Primary | ICD-10-CM

## 2025-08-21 ENCOUNTER — TELEPHONE (OUTPATIENT)
Dept: FAMILY MEDICINE CLINIC | Age: 40
End: 2025-08-21

## 2025-09-02 ENCOUNTER — OFFICE VISIT (OUTPATIENT)
Dept: FAMILY MEDICINE CLINIC | Age: 40
End: 2025-09-02
Payer: COMMERCIAL

## 2025-09-02 VITALS
DIASTOLIC BLOOD PRESSURE: 76 MMHG | BODY MASS INDEX: 33.02 KG/M2 | TEMPERATURE: 97.5 F | RESPIRATION RATE: 16 BRPM | HEART RATE: 74 BPM | OXYGEN SATURATION: 97 % | WEIGHT: 186.4 LBS | SYSTOLIC BLOOD PRESSURE: 123 MMHG

## 2025-09-02 DIAGNOSIS — F41.9 ANXIETY: ICD-10-CM

## 2025-09-02 DIAGNOSIS — R07.9 CHEST PAIN, UNSPECIFIED TYPE: Primary | ICD-10-CM

## 2025-09-02 PROCEDURE — 99214 OFFICE O/P EST MOD 30 MIN: CPT | Performed by: FAMILY MEDICINE

## 2025-09-02 RX ORDER — BUSPIRONE HYDROCHLORIDE 5 MG/1
5 TABLET ORAL 2 TIMES DAILY
Qty: 60 TABLET | Refills: 0 | Status: SHIPPED | OUTPATIENT
Start: 2025-09-02 | End: 2025-10-02

## 2025-09-02 ASSESSMENT — PATIENT HEALTH QUESTIONNAIRE - PHQ9
3. TROUBLE FALLING OR STAYING ASLEEP: NOT AT ALL
6. FEELING BAD ABOUT YOURSELF - OR THAT YOU ARE A FAILURE OR HAVE LET YOURSELF OR YOUR FAMILY DOWN: NOT AT ALL
10. IF YOU CHECKED OFF ANY PROBLEMS, HOW DIFFICULT HAVE THESE PROBLEMS MADE IT FOR YOU TO DO YOUR WORK, TAKE CARE OF THINGS AT HOME, OR GET ALONG WITH OTHER PEOPLE: NOT DIFFICULT AT ALL
SUM OF ALL RESPONSES TO PHQ QUESTIONS 1-9: 0
SUM OF ALL RESPONSES TO PHQ QUESTIONS 1-9: 0
1. LITTLE INTEREST OR PLEASURE IN DOING THINGS: NOT AT ALL
7. TROUBLE CONCENTRATING ON THINGS, SUCH AS READING THE NEWSPAPER OR WATCHING TELEVISION: NOT AT ALL
5. POOR APPETITE OR OVEREATING: NOT AT ALL
SUM OF ALL RESPONSES TO PHQ QUESTIONS 1-9: 0
2. FEELING DOWN, DEPRESSED OR HOPELESS: NOT AT ALL
8. MOVING OR SPEAKING SO SLOWLY THAT OTHER PEOPLE COULD HAVE NOTICED. OR THE OPPOSITE, BEING SO FIGETY OR RESTLESS THAT YOU HAVE BEEN MOVING AROUND A LOT MORE THAN USUAL: NOT AT ALL
4. FEELING TIRED OR HAVING LITTLE ENERGY: NOT AT ALL
9. THOUGHTS THAT YOU WOULD BE BETTER OFF DEAD, OR OF HURTING YOURSELF: NOT AT ALL
SUM OF ALL RESPONSES TO PHQ QUESTIONS 1-9: 0

## (undated) DEVICE — FORCEPS BX L240CM JAW DIA2.4MM ORNG L CAP W/ NDL DISP RAD